# Patient Record
Sex: FEMALE | Race: WHITE | NOT HISPANIC OR LATINO | Employment: STUDENT | URBAN - METROPOLITAN AREA
[De-identification: names, ages, dates, MRNs, and addresses within clinical notes are randomized per-mention and may not be internally consistent; named-entity substitution may affect disease eponyms.]

---

## 2020-04-14 ENCOUNTER — INITIAL PRENATAL (OUTPATIENT)
Dept: OBSTETRICS AND GYNECOLOGY | Facility: CLINIC | Age: 25
End: 2020-04-14

## 2020-04-14 VITALS
HEIGHT: 67 IN | WEIGHT: 178 LBS | DIASTOLIC BLOOD PRESSURE: 76 MMHG | BODY MASS INDEX: 27.94 KG/M2 | SYSTOLIC BLOOD PRESSURE: 117 MMHG

## 2020-04-14 DIAGNOSIS — Z34.00 SUPERVISION OF NORMAL FIRST PREGNANCY, ANTEPARTUM: Primary | ICD-10-CM

## 2020-04-14 LAB
B-HCG UR QL: POSITIVE
GLUCOSE UR STRIP-MCNC: NEGATIVE MG/DL
INTERNAL NEGATIVE CONTROL: NEGATIVE
INTERNAL POSITIVE CONTROL: POSITIVE
Lab: ABNORMAL
PROT UR STRIP-MCNC: ABNORMAL MG/DL

## 2020-04-14 PROCEDURE — 81025 URINE PREGNANCY TEST: CPT | Performed by: OBSTETRICS & GYNECOLOGY

## 2020-04-14 PROCEDURE — 99204 OFFICE O/P NEW MOD 45 MIN: CPT | Performed by: OBSTETRICS & GYNECOLOGY

## 2020-04-14 RX ORDER — PNV NO.95/FERROUS FUM/FOLIC AC 28MG-0.8MG
TABLET ORAL
COMMUNITY
End: 2022-01-18

## 2020-04-14 NOTE — PATIENT INSTRUCTIONS
"Nausea/vomiting in pregnancy:  To help with nausea and vomiting you may try the following over the counter products:  Lucia chews, lucia tea, lucia gum  Mint tea, peppermint gum or candy  B6/Doxylamine: to be taken daily, not just when symptoms occur  Pyridoxine (also known as B6): 10 to 25 mg orally every six to eight hours; the maximum treatment dose suggested for pregnant women is 200 mg/day.  Doxylamine (available in some over-the-counter sleeping pills (eg, Unisom Sleep Tabs) and as an antihistamine chewable tablet (Aldex AN)). One-half of the 25 mg over-the-counter tablet or two chewable 5 mg tablets can be used off-label as an antiemetic  · The Association of Profess[employer] of Gyne[employer] and Obstetric[employer] has released a smart phone application that can help you monitor and manage your symptoms.  The Application is \"Managing NVP,\" and has a green icon that says \"APGO WELLMOM.\"    If these do not work, we may need to try prescription medications.    Please contact us if you are unable to tolerate liquids (even sips of water) for over six to eight hours, have weight loss of over 10% of your body weight, blood in vomit, fever (temp of 100.4 or higher), or other concerning symptoms arise.          The following are CPT codes for the optional tests in pregnancy:  Cystic fibrosis screenin  Spinal Muscular atrophy screening 84468  InformaSeq with XY (aneuploidy screening) 98172    The ICD 10 code for most pregnancies is Z34.90  Travel During Pregnancy:  • Always use seatbelts.  A lap belt should be worn below the abdomen (across the hips) and the shoulder belt should be worn across the center of your chest (between the breasts) away from your neck.  Do not put the shoulder belt under your arm or behind your back.  Pull any slack out of the belt.  • Air travel is safe in most uncomplicated pregnancies, but we do not recommend air travel past 36 weeks.  Airlines may also have restrictions, so check with your " airline before flying.  For some international flights, the travel cut off may be as early as 28 weeks gestation, and some airlines may require letters from your physician.  • When going on long trips in car, plane, train, or bus, frequent ambulation is important to prevent blood clots in legs and/or lungs.  The following may help with prevention of blood clots in legs: Drink lots of fluid, wear loose-fitting clothing, walk and stretch at regular intervals.    • Avoid areas with Zika outbreaks.  For the latest information, you may visit: www.cdc.gov/travel/notices/.  Resources: , , Committee Opinion 455  Nutrition during pregnancy  • Average weight gain during pregnancy is based on your pre-pregnancy body mass index (BMI).  See below for recommended weight gain:  o Underweight (BMI <18.5), we recommend 28 to 40lb weight gain  o Normal weight (BMI 18.5 to 24.9), we recommend a 25 to 35lb weight gain  o Overweight (BMI 25-29.9), we recommend a 15 to 25lb weight gain  o Obese (BMI >30), we recommend keeping weight gain under 20 lbs.    • You should speak with your physician regarding your specific weight goals for this pregnancy.   • Foods to avoid include:   o Fish: avoid certain types of fish such as shark, swordfish, keshia mackerel, tilefish.  Limit white (albacore) tuna to 6 oz per week.  Choose fish and shellfish such as shrimp, salmon, catfish, [place].  o Food-borne illness: Pregnant women are much more likely to get Listeriosis than non-pregnant women.  To help prevent this, avoid eating unpasteurized milk and unpasteurized milk products, hot dogs/lunch meat/cold cuts unless they are heated until steaming right before serving, refrigerated meat spreads, refrigerated smoked seafood, raw or undercooked seafood/eggs/meat.   • Vitamin D helps development of the baby’s bones and teeth.  Good sources of Vitamin D include milk fortified with Vitamin D and fatty fish such as salmon.    • Folic acid, also  known as folate, helps develop the baby’s brain and spine.  You should make sure your vitamin contains extra folic acid - at least 400mcg.    • Iron helps make red blood cells.  You need to make extra red blood cell sin pregnancy.  We recommend eating Iron-rich foods such as lean red meat, poultry, fish, dried beans and peas, iron-fortified cereals, and prune juice.  You may be recommended an iron supplement.  If so, it is absorbed more easily if taken with vitamin C-rich foods such as citrus fruits or tomatoes.    • It is important to eat a well balanced diet.  A good recourse for nutrition recommendations is: www.choosemyplate.gov.  • Limit caffeine intake to 200mg daily.  Some coffees/teas/sodas have very different levels of caffeine per serving, so check the nutrition labeling.   Resources: , Committee Opinion 548  Exercise during pregnancy  • If you are healthy and your pregnancy is normal, it is safe to continue or start most types of exercise.   Physical activity does not increase your risk of miscarriage, low birth weight or early delivery, but you should discuss specific limitations or any complications with your physician.    • Benefits of exercise during pregnancy include: reduced back pain, less constipation, promotes overall health and healthy weight gain which may decrease risks for certain pregnancy complications such as diabetes and/or preeclampsia.  • The CDC recommends 150 minutes of moderate intensity aerobic exercise per week.  Moderate intensity means that you are moving enough to raise your heart rate and start sweating, but you can talk normally.  Brisk walking, swimming/water workouts, modified yoga/pilates, and use of elliptical machines and/or stationary bikes are examples of aerobic activity.    • Precautions:  o Stay hydrated.  Drink plenty of water before, during and after your workout  o Wear supportive clothing such as a sports bra  o Do not become overheated.  Do not exercise  outside when it is very hot or humid  o Avoid lying flat on your back as much as possible  o AVOID contact sports, skydiving, sports that risk falling (such as skiing, surfing, off-road cycling, gymnastics, horseback riding), hot yoga/hot pilates, scuba diving  • Stop exercising if you experience: bleeding from the vagina, feeling dizzy/faint, shortness of breath before starting exercise, chest pain, headache, muscle weakness, calf pain or swelling, regular uterine contractions, fluid leaking from the vagina.    • Postpartum exercise: Continuing exercise after you deliver your baby will help boost your energy, strengthen muscles, promote better sleep, and relieve stress.  It also may be useful in preventing postpartum depression.  150 minutes of moderate-intensity aerobic activity is recommended.  Types of exercise and when you can start a regular exercise routine may be limited by the type of delivery you had.  Please discuss with your physician prior to resuming or starting exercise.    Resources: ,   Back pain during pregnancy  • Back pain is very common during pregnancy.  It may arise due to strain on your back muscles, weakness of the abdominal muscles, and pregnancy hormones.    • To prevent back pain:  o Wear shoes with good support. Flat shoes and high heels may not have good arch support.  o Consider a firm mattress  o Use good lifting practices.  Do not bend from the waist to pick things up.  Squat down and bend your knees, keeping a straight back.  o Sit in chairs with good back support or use a small pillow behind your lower back.    o Sleep on your side with one or two pillows between your legs  • To ease back pain:   o Exercise can help stretch strained muscles and strengthen weak muscles to promote good posture  • Contact your health care provider with severe pain, pain that persists for more than 2 weeks, fever, burning during urination, or vaginal bleeding.  Resources: FAQ  "115    Nausea/vomiting in pregnancy:  To help with nausea and vomiting you may try the following over the counter products:  Lucia chews, lucia tea, lucia gum  Mint tea, peppermint gum or candy  B6/Doxylamine: to be taken daily, not just when symptoms occur  Pyridoxine (also known as B6): 10 to 25 mg orally every six to eight hours; the maximum treatment dose suggested for pregnant women is 200 mg/day.  Doxylamine (available in some over-the-counter sleeping pills (eg, Unisom Sleep Tabs) and as an antihistamine chewable tablet (Aldex AN)). One-half of the 25 mg over-the-counter tablet or two chewable 5 mg tablets can be used off-label as an antiemetic  · The Association of Professors [employer] Gyne[employer] Obstetric[employer] has released a smart phone application that can help you monitor and manage your symptoms.  The Application is \"Managing NVP,\" and has a green icon that says \"APGO WELLMOM.\"    If these do not work, we may need to try prescription medications.    Please contact us if you are unable to tolerate liquids (even sips of water) for over six to eight hours, have weight loss of over 10% of your body weight, blood in vomit, fever (temp of 100.4 or higher), or other concerning symptoms arise.          The following are CPT codes for the optional tests in pregnancy:  Cystic fibrosis screenin  Spinal Muscular atrophy screening 22467  InformaSeq with XY (aneuploidy screening) 51401    The ICD 10 code for most pregnancies is Z34.90  Travel During Pregnancy:  • Always use seatbelts.  A lap belt should be worn below the abdomen (across the hips) and the shoulder belt should be worn across the center of your chest (between the breasts) away from your neck.  Do not put the shoulder belt under your arm or behind your back.  Pull any slack out of the belt.  • Air travel is safe in most uncomplicated pregnancies, but we do not recommend air travel past 36 weeks.  Airlines may also have restrictions, so check with your " airline before flying.  For some international flights, the travel cut off may be as early as 28 weeks gestation, and some airlines may require letters from your physician.  • When going on long trips in car, plane, train, or bus, frequent ambulation is important to prevent blood clots in legs and/or lungs.  The following may help with prevention of blood clots in legs: Drink lots of fluid, wear loose-fitting clothing, walk and stretch at regular intervals.    • Avoid areas with Zika outbreaks.  For the latest information, you may visit: www.cdc.gov/travel/notices/.  Resources: , , Committee Opinion 455  Nutrition during pregnancy  • Average weight gain during pregnancy is based on your pre-pregnancy body mass index (BMI).  See below for recommended weight gain:  o Underweight (BMI <18.5), we recommend 28 to 40lb weight gain  o Normal weight (BMI 18.5 to 24.9), we recommend a 25 to 35lb weight gain  o Overweight (BMI 25-29.9), we recommend a 15 to 25lb weight gain  o Obese (BMI >30), we recommend keeping weight gain under 20 lbs.    • You should speak with your physician regarding your specific weight goals for this pregnancy.   • Foods to avoid include:   o Fish: avoid certain types of fish such as shark, swordfish, keshia mackerel, tilefish.  Limit white (albacore) tuna to 6 oz per week.  Choose fish and shellfish such as shrimp, salmon, catfish, [place].  o Food-borne illness: Pregnant women are much more likely to get Listeriosis than non-pregnant women.  To help prevent this, avoid eating unpasteurized milk and unpasteurized milk products, hot dogs/lunch meat/cold cuts unless they are heated until steaming right before serving, refrigerated meat spreads, refrigerated smoked seafood, raw or undercooked seafood/eggs/meat.   • Vitamin D helps development of the baby’s bones and teeth.  Good sources of Vitamin D include milk fortified with Vitamin D and fatty fish such as salmon.    • Folic acid, also  115    SMOKING CESSATION DURING PREGNANCY   When you stop smoking...   • your baby will get more oxygen, even after just one day of not smoking   • your baby is less likely to have bronchitis and asthma   • there is less risk that your baby will be born too early   • there is a better chance that your baby will come home from the hospital with you   • you will be less likely to develop heart disease, stroke, lung cancer, chronic lung disease, and other smoke related diseases   • you will be more likely to live to know your grandchildren   • you will have more energy and breathe more easily   • you will have more money that you can spend on other things   • your clothes, hair, and home will smell better   • your food will taste better   • you will feel good about what you have done for yourself and your baby     TIMING OF HEALTH BENEFITS AFTER QUITTING SMOKING ([facility] 2004)   Time since quitting Benefits   20 minutes   Your heart rate drops.   12 hours   Carbon monoxide level in your blood drops to normal.   2 weeks to 3 months Your heart attack risk begins to drop.      Your lung function begins to improve.   1 to 9 months   Your coughing and shortness of breath decrease.   1 year    Your added risk of coronary heart disease is half that of a smoker’s.   5 to 15 years   Your stroke risk is reduced to that of a nonsmoker’s.   10 years   Your lung cancer death rate is about half that of a smoker’s. Your risk of cancers of the mouth, throat, esophagus, bladder, kidney,    and pancreas decreases.   15 years   Your risk of coronary heart disease is back to that of a nonsmoker’s.     Adapted from ACOG (www.acog.org)

## 2020-04-14 NOTE — PROGRESS NOTES
"Initial OB Visit    Chief Complaint   Patient presents with   • Initial Prenatal Visit     pt is here for her initial visit on her first pregnancy. pt reports \"hardness on both breast\". Pt also reports intermittent mild cramping         Kristin Jones is being seen today for her first obstetrical visit.  She is a 24 y.o.    7w0d gestation by sure but irregular LMP  FOB: Danny Reyer, boyfriend - aware and supportive  This is not a planned pregnancy.   OB History    Para Term  AB Living   1             SAB TAB Ectopic Molar Multiple Live Births                    # Outcome Date GA Lbr Ananda/2nd Weight Sex Delivery Anes PTL Lv   1 Current                Current obstetric complaints: breast tenderness, slight nausea   Prior obstetric issues, potential pregnancy concerns: denies  Family history of genetic issues (includes FOB): denies  Prior infections concerning in pregnancy (Rash, fever since LMP): denies  Varicella Hx: immune by history  Prior genetic testing: denies  History of abnormal pap smears: denies  History of STIs: chlamydia 6 Years ago  History of HSV in self or partner? denies  Prepregnancy weight same    Past Medical History:   Diagnosis Date   • Chlamydia    • Low blood sugar        Past Surgical History:   Procedure Laterality Date   • WISDOM TOOTH EXTRACTION  2015       No current outpatient medications on file.    Allergies   Allergen Reactions   • Bactrim [Sulfamethoxazole-Trimethoprim] GI Intolerance   • Doxycycline GI Intolerance       Social History     Socioeconomic History   • Marital status: Single     Spouse name: Not on file   • Number of children: Not on file   • Years of education: Not on file   • Highest education level: Not on file   Tobacco Use   • Smoking status: Current Every Day Smoker     Packs/day: 0.50     Types: Cigarettes   • Smokeless tobacco: Never Used   Substance and Sexual Activity   • Alcohol use: Not Currently   • Drug use: Yes     Types: Marijuana " "    Comment: cutting down    • Sexual activity: Yes     Partners: Male     Birth control/protection: None       Family History   Problem Relation Age of Onset   • Breast cancer Mother 40        survivor   • Cervical cancer Sister         40's   • Colon cancer Paternal Grandmother         80's   • Ovarian cancer Neg Hx    • Uterine cancer Neg Hx    • Pulmonary embolism Neg Hx    • Deep vein thrombosis Neg Hx        Review of systems     Constitutional: negative for chills, fevers and negative for fatigue  Eyes: negative  Ears, nose, mouth, throat, and face: negative for hearing loss and nasal congestion  Respiratory: negative for asthma and wheezing  Cardiovascular: negative for chest pain and dyspnea  Gastrointestinal: negative for dyspepsia, dysphagia abdominal pain  Genitourinary:negative for urinary incontinence  Integument/breast: negative for breast lump  Hematologic/lymphatic: negative for bleeding  Musculoskeletal:negative for aches  Neurological: negative for numbness/tingling  Behavioral/Psych: negative for anhedonia  Allergic/Immunologic: negative for rash, allergy         Objective    /76   Ht 170.2 cm (67\")   Wt 80.7 kg (178 lb)   LMP 02/25/2020 (Exact Date)   BMI 27.88 kg/m²       General Appearance:    Alert, cooperative, in no acute distress, habitus normal   Head:    Normocephalic, without obvious abnormality, atraumatic   Eyes:            Lids and lashes normal, conjunctivae and sclerae normal, no   icterus, no pallor, corneas clear   Ears:    Ears appear intact with no abnormalities noted       Neck:   No adenopathy, supple, trachea midline, no thyromegaly   Back:     No kyphosis present, no scoliosis present,                       Lungs:     Clear to auscultation,respirations regular, even and                   unlabored    Heart:    Regular rhythm and normal rate, normal S1 and S2, no            murmur, no gallop, no rub, no click   Breast Exam:    No masses, No nipple discharge "   Abdomen:     Normal bowel sounds, no masses, no organomegaly, soft        non-tender, non-distended, no guarding, no rebound                 tenderness   Genitalia:    Vulva - BUS-WNL, NEFG    Vagina - No discharge, No bleeding    Cervix - No Lesions, closed     Uterus - Consistent with 7 weeks    Adnexa - No masses, NT    Pelvimetry - clinically adequate, gynecoid pelvis     Extremities:   Moves all extremities well, no edema, no cyanosis, no              redness   Pulses:   Pulses palpable and equal bilaterally   Skin:   No bleeding, bruising or rash   Lymph nodes:   No palpable adenopathy   Neurologic:   Sensation intact, A&O times 3      Assessment  Pregnancy at 7w0d  Smoking, plans to quit by 16 weeks  Marijuana use, plans to quit  Breast tenderness     Plan    Initial labs drawn, GC/CHL screen done  Patient is on Prenatal vitamins  Problem list reviewed and updated.  Reviewed routine prenatal care with the office to include but not limited to:   Zika (travel restrictions/ok to use insect repellant), not to changing cat litter, food restrictions, avoidance of alcohol, tobacco and drugs and saunas/hot tubs, anticipated weight gain/nutrition requirements.  Reviewed nature of practice and hospital.  Reviewed recommended follow up, importance of compliance with care. We reviewed testing in pregnancy including HIV testing and urine drug screen.    Reviewed aneuploidy screening and CF/SMA screening.  We reviewed limitations of testing, possibility of false positive/negative results, possible need for other tests as indicated.    Reviewed that breast tenderness can be a normal response to hormonal changes in pregnancy. Breast exam benign.    Counseled on limitations of ultrasound in pregnancy in detecting aneuploidy/fetal anomalies    Encouraged smoking cessation - would like to set quit date before 16 weeks.  Encouraged on abstinence from marijuana     We reviewed that at this time, her BMI is classified as BMI  25.0-29.9        Classification: overweight.  We reviewed that in pregnancy, her recommended weight gain is 15-20lbs.  In the first trimester, caloric demand is typically not increased.  In the second and third trimester, the increased demand is approximately 350 and 450 calories respectively.    All questions answered.   Return in about 1 week (around 4/21/2020) for dating US, 4 weeks OB visit.      Jessica Jacome MD

## 2020-04-15 LAB
ABO GROUP BLD: NORMAL
BACTERIA UR CULT: NO GROWTH
BACTERIA UR CULT: NORMAL
BASOPHILS # BLD AUTO: 0.1 X10E3/UL (ref 0–0.2)
BASOPHILS NFR BLD AUTO: 1 %
BLD GP AB SCN SERPL QL: NEGATIVE
CONV .: NORMAL
CYTOLOGIST CVX/VAG CYTO: NORMAL
CYTOLOGY CVX/VAG DOC CYTO: NORMAL
CYTOLOGY CVX/VAG DOC THIN PREP: NORMAL
DX ICD CODE: NORMAL
EOSINOPHIL # BLD AUTO: 0.1 X10E3/UL (ref 0–0.4)
EOSINOPHIL NFR BLD AUTO: 1 %
ERYTHROCYTE [DISTWIDTH] IN BLOOD BY AUTOMATED COUNT: 13.6 % (ref 11.7–15.4)
HBV SURFACE AG SERPL QL IA: NEGATIVE
HCT VFR BLD AUTO: 43.5 % (ref 34–46.6)
HCV AB S/CO SERPL IA: <0.1 S/CO RATIO (ref 0–0.9)
HGB BLD-MCNC: 14.5 G/DL (ref 11.1–15.9)
HIV 1 & 2 AB SER-IMP: NORMAL
HIV 1+2 AB+HIV1 P24 AG SERPL QL IA: NON REACTIVE
IMM GRANULOCYTES # BLD AUTO: 0 X10E3/UL (ref 0–0.1)
IMM GRANULOCYTES NFR BLD AUTO: 0 %
LYMPHOCYTES # BLD AUTO: 1.9 X10E3/UL (ref 0.7–3.1)
LYMPHOCYTES NFR BLD AUTO: 21 %
MCH RBC QN AUTO: 28.9 PG (ref 26.6–33)
MCHC RBC AUTO-ENTMCNC: 33.3 G/DL (ref 31.5–35.7)
MCV RBC AUTO: 87 FL (ref 79–97)
MONOCYTES # BLD AUTO: 0.8 X10E3/UL (ref 0.1–0.9)
MONOCYTES NFR BLD AUTO: 8 %
NEUTROPHILS # BLD AUTO: 6.6 X10E3/UL (ref 1.4–7)
NEUTROPHILS NFR BLD AUTO: 69 %
OTHER STN SPEC: NORMAL
PLATELET # BLD AUTO: 283 X10E3/UL (ref 150–450)
RBC # BLD AUTO: 5.02 X10E6/UL (ref 3.77–5.28)
RH BLD: POSITIVE
RPR SER QL: NON REACTIVE
RUBV IGG SERPL IA-ACNC: 2.37 INDEX
STAT OF ADQ CVX/VAG CYTO-IMP: NORMAL
WBC # BLD AUTO: 9.4 X10E3/UL (ref 3.4–10.8)

## 2020-04-16 LAB
C TRACH RRNA SPEC QL NAA+PROBE: NEGATIVE
N GONORRHOEA RRNA SPEC QL NAA+PROBE: NEGATIVE
T VAGINALIS DNA SPEC QL NAA+PROBE: NEGATIVE

## 2020-04-17 LAB
AMPHETAMINES UR QL SCN: NEGATIVE NG/ML
BARBITURATES UR QL SCN: NEGATIVE NG/ML
BENZODIAZ UR QL: NEGATIVE NG/ML
BZE UR QL: NEGATIVE NG/ML
CANNABINOIDS UR CFM-MCNC: POSITIVE NG/ML
METHADONE UR QL SCN: NEGATIVE NG/ML
OPIATES UR QL: NEGATIVE NG/ML
PCP UR QL: NEGATIVE NG/ML
PROPOXYPH UR QL SCN: NEGATIVE NG/ML

## 2020-04-20 ENCOUNTER — PROCEDURE VISIT (OUTPATIENT)
Dept: OBSTETRICS AND GYNECOLOGY | Facility: CLINIC | Age: 25
End: 2020-04-20

## 2020-04-20 DIAGNOSIS — Z34.91 PREGNANCY WITH UNCERTAIN DATES IN FIRST TRIMESTER: Primary | ICD-10-CM

## 2020-04-20 PROCEDURE — 76817 TRANSVAGINAL US OBSTETRIC: CPT | Performed by: OBSTETRICS & GYNECOLOGY

## 2020-05-12 ENCOUNTER — ROUTINE PRENATAL (OUTPATIENT)
Dept: OBSTETRICS AND GYNECOLOGY | Facility: CLINIC | Age: 25
End: 2020-05-12

## 2020-05-12 VITALS — DIASTOLIC BLOOD PRESSURE: 69 MMHG | SYSTOLIC BLOOD PRESSURE: 116 MMHG | WEIGHT: 187 LBS | BODY MASS INDEX: 29.29 KG/M2

## 2020-05-12 DIAGNOSIS — Z34.00 SUPERVISION OF NORMAL FIRST PREGNANCY, ANTEPARTUM: Primary | ICD-10-CM

## 2020-05-12 LAB
GLUCOSE UR STRIP-MCNC: NEGATIVE MG/DL
PROT UR STRIP-MCNC: ABNORMAL MG/DL

## 2020-05-12 PROCEDURE — 99213 OFFICE O/P EST LOW 20 MIN: CPT | Performed by: OBSTETRICS & GYNECOLOGY

## 2020-05-12 NOTE — PROGRESS NOTES
Chief Complaint   Patient presents with   • Routine Prenatal Visit      Kristin Jones is a 24 y.o.  at 11w0d   No issues. Vomiting and nausea improved  Denies cramping, vaginal bleeding   Still smoking and using THC but trying to quit  /69   Wt 84.8 kg (187 lb)   LMP 2020 (Exact Date)   BMI 29.29 kg/m²    Gen: NAD, well appearing  Abd: nontender    See OB Flowsheet    ASSESSMENT:   1. IUP at 11w0d   2. Cigarette smoking  3. THC use  PLAN:  Reviewed labs from last visit.  FHT confirmed by US today  Recommend smoking and THC cessation. Patient aware and agrees to quit by 16 weeks  Reviewed first trimester bleeding/pain precautions, and indications for sooner follow up.     Return in about 4 weeks (around 2020).  Orders Placed This Encounter   Procedures   • POC Urinalysis Dipstick     This is an external result entered through the Results Console.

## 2020-06-09 ENCOUNTER — ROUTINE PRENATAL (OUTPATIENT)
Dept: OBSTETRICS AND GYNECOLOGY | Facility: CLINIC | Age: 25
End: 2020-06-09

## 2020-06-09 VITALS — SYSTOLIC BLOOD PRESSURE: 128 MMHG | BODY MASS INDEX: 29.44 KG/M2 | DIASTOLIC BLOOD PRESSURE: 79 MMHG | WEIGHT: 188 LBS

## 2020-06-09 DIAGNOSIS — Z34.00 SUPERVISION OF NORMAL FIRST PREGNANCY, ANTEPARTUM: Primary | ICD-10-CM

## 2020-06-09 PROCEDURE — 99213 OFFICE O/P EST LOW 20 MIN: CPT | Performed by: OBSTETRICS & GYNECOLOGY

## 2020-06-09 NOTE — PROGRESS NOTES
Chief Complaint   Patient presents with   • Routine Prenatal Visit      Kristin Jones is a 24 y.o.  at 15w0d   Reports she has stopped smoking weed about 7 days ago still smoking 5-6 cigarettes per day  Denies cramping, vaginal bleeding   Has not yet felt fetal movement    /79   Wt 85.3 kg (188 lb)   LMP 2020 (Exact Date)   BMI 29.44 kg/m²    Gen: NAD, well appearing  Abd: gravid, nontender    See OB Flowsheet    ASSESSMENT:   1. IUP at 15w0d   2. Cigarette use  PLAN:  I spent at least 10 minutes of 15 minute visit in face-to-face counseling on common second trimester symptoms.  Reviewed precautions for signs of  labor, anticipated fetal movements.     Return in about 4 weeks (around 2020) for anatomy US, OB visit.  No orders of the defined types were placed in this encounter.

## 2020-07-07 ENCOUNTER — PROCEDURE VISIT (OUTPATIENT)
Dept: OBSTETRICS AND GYNECOLOGY | Facility: CLINIC | Age: 25
End: 2020-07-07

## 2020-07-07 ENCOUNTER — ROUTINE PRENATAL (OUTPATIENT)
Dept: OBSTETRICS AND GYNECOLOGY | Facility: CLINIC | Age: 25
End: 2020-07-07

## 2020-07-07 VITALS — WEIGHT: 201 LBS | DIASTOLIC BLOOD PRESSURE: 73 MMHG | BODY MASS INDEX: 31.48 KG/M2 | SYSTOLIC BLOOD PRESSURE: 109 MMHG

## 2020-07-07 DIAGNOSIS — Z36.89 ENCOUNTER FOR FETAL ANATOMIC SURVEY: Primary | ICD-10-CM

## 2020-07-07 DIAGNOSIS — Z34.00 SUPERVISION OF NORMAL FIRST PREGNANCY, ANTEPARTUM: Primary | ICD-10-CM

## 2020-07-07 LAB
GLUCOSE UR STRIP-MCNC: NEGATIVE MG/DL
PROT UR STRIP-MCNC: NEGATIVE MG/DL

## 2020-07-07 PROCEDURE — 76805 OB US >/= 14 WKS SNGL FETUS: CPT | Performed by: OBSTETRICS & GYNECOLOGY

## 2020-07-07 PROCEDURE — 99213 OFFICE O/P EST LOW 20 MIN: CPT | Performed by: OBSTETRICS & GYNECOLOGY

## 2020-07-07 NOTE — PROGRESS NOTES
"Chief Complaint   Patient presents with   • Routine Prenatal Visit     pt reports her right thigh feels numb sometimes.       Kristin Jones is a 24 y.o.  at 19w0d   Reports breast pain on her left side over the last week on rare occasions. She says the right breast will be tender medially and then look \"dimpled.\"  She denies any nipple discharge or fever.  The changes are not curently present. The numbness on her thighs used to come and go when she weighed more.  It is currently just on the right. She reports a h/o breast cancer in her mom at 41yo.  No known testing for BRCA  Denies cramping, vaginal bleeding   Notes some fetal movement    /73   Wt 91.2 kg (201 lb)   LMP 2020 (Exact Date)   BMI 31.48 kg/m²    Gen: NAD, well appearing  Breasts: normal bilaterally, no masses, dimpling or nipple retraction, no nipple discharge  Abd: gravid, nontender  See OB Flowsheet    ASSESSMENT:   1. IUP at 19w0d   2. Breast change on right  PLAN:  Reviewed no breast changes on exam but if persistent or recurrent, consider imaging. Pt had benign imaging three years ago (with cysts noted).  Reviewed common second trimester symptoms.  Reviewed precautions for signs of  labor, anticipated fetal movements.     Reviewed anatomy US from today which was within normal limits, breech.   Reviewed common second trimester symptoms.  Reviewed precautions for signs of  labor, anticipated fetal movements.      Return in about 4 weeks (around 2020).  Orders Placed This Encounter   Procedures   • POC Urinalysis Dipstick     This is an external result entered through the Results Console.                "

## 2020-07-15 ENCOUNTER — TELEPHONE (OUTPATIENT)
Dept: OBSTETRICS AND GYNECOLOGY | Facility: CLINIC | Age: 25
End: 2020-07-15

## 2020-07-15 ENCOUNTER — ROUTINE PRENATAL (OUTPATIENT)
Dept: OBSTETRICS AND GYNECOLOGY | Facility: CLINIC | Age: 25
End: 2020-07-15

## 2020-07-15 VITALS — WEIGHT: 202 LBS | BODY MASS INDEX: 31.64 KG/M2 | SYSTOLIC BLOOD PRESSURE: 133 MMHG | DIASTOLIC BLOOD PRESSURE: 75 MMHG

## 2020-07-15 DIAGNOSIS — N63.0 BREAST MASS IN FEMALE: Primary | ICD-10-CM

## 2020-07-15 LAB
GLUCOSE UR STRIP-MCNC: NEGATIVE MG/DL
PROT UR STRIP-MCNC: ABNORMAL MG/DL

## 2020-07-15 PROCEDURE — 99214 OFFICE O/P EST MOD 30 MIN: CPT | Performed by: OBSTETRICS & GYNECOLOGY

## 2020-07-15 RX ORDER — ACETAMINOPHEN 500 MG
500 TABLET ORAL ONCE AS NEEDED
COMMUNITY
End: 2021-01-15

## 2020-07-15 NOTE — TELEPHONE ENCOUNTER
----- Message from Marina Krish sent at 7/14/2020 11:50 AM EDT -----  Regarding: Visit Follow-Up Question  Contact: [phone]  [facility]    ----- Message -----  From: Kristin Jones  Sent: 7/14/2020  11:37 AM EDT  To: Aureliano Mata [facility] Clinical Pool  Subject: Visit Follow-Up Question                         My breast around the nipple has swollen again, it started around 3 days ago and got progressively worse and my nipple is starting to invert with the dimpling around the areola. I have tried a warm shower and massaging but it isn't tender or painful. The nipple itself is sensitive but that's been since my pregnancy started. I am really concerned about it.       Thank you for your time,     Kristin Jones

## 2020-07-15 NOTE — PROGRESS NOTES
Chief Complaint   Patient presents with   • Breast Problem     pt reports swollen area on the areola for 4 days now.       Kristin Jones is a 24 y.o.  at 20w1d   Reports 4 days ago she had more dimpling and swelling on the medial side of the right breast. She has some mild pain underneath.  The nipple is now starting to invert.  Denies any lactation or discharge.  No fever or chills. It was looking red a few days ago, but no heat.  She reports the dimpling has worsened and the redness has improved.    Denies cramping, vaginal bleeding   Has not yet felt fetal movements    /75   Wt 91.6 kg (202 lb)   LMP 2020 (Exact Date)   BMI 31.64 kg/m²    Gen: NAD, well appearing  Breast: right with skin dimpling and nipple retraction from 3 to 6 o'clock; mass around 1cm in diameter at 3 o'clock near nipple palpable in upright position, other mobile mass, about 3cm in right upper outer quadrant palpable supine, left breast without nipple retraction, mass or skin changes  Abd: gravid, nontender  See OB Flowsheet    ASSESSMENT:   1. IUP at 20w1d   2. Right breast mass  PLAN:  Sent for right breast US  Plan for dicloxacillin given redness seen in photos shown by patient.    Recommend RTO in 1 week for re-examination    Return in about 1 week (around 2020).  Orders Placed This Encounter   Procedures   • US Breast Right Complete     Standing Status:   Future     Standing Expiration Date:   7/15/2021     Scheduling Instructions:      Patient is pregnant     Order Specific Question:   Reason for Exam:     Answer:   mobile mass about 2cm from nipple at 3 o'clock, 1cm in diameter, h/o cysts in breasts, right upper outer quadrant with cystic like mass around 10 o'clock   • POC Urinalysis Dipstick     This is an external result entered through the Results Console.

## 2020-07-15 NOTE — TELEPHONE ENCOUNTER
07/15/20  Spoke to patient and she is aware of appt at 12:45 pm today at the [employer] location.

## 2020-07-16 ENCOUNTER — APPOINTMENT (OUTPATIENT)
Dept: WOMENS IMAGING | Facility: HOSPITAL | Age: 25
End: 2020-07-16

## 2020-07-16 PROCEDURE — 76641 ULTRASOUND BREAST COMPLETE: CPT | Performed by: RADIOLOGY

## 2020-07-20 DIAGNOSIS — N63.0 BREAST MASS IN FEMALE: ICD-10-CM

## 2020-07-21 ENCOUNTER — OFFICE VISIT (OUTPATIENT)
Dept: OBSTETRICS AND GYNECOLOGY | Facility: CLINIC | Age: 25
End: 2020-07-21

## 2020-07-21 VITALS
SYSTOLIC BLOOD PRESSURE: 126 MMHG | WEIGHT: 206.4 LBS | HEIGHT: 67 IN | DIASTOLIC BLOOD PRESSURE: 74 MMHG | BODY MASS INDEX: 32.39 KG/M2

## 2020-07-21 DIAGNOSIS — Z87.898 HISTORY OF BREAST PROBLEM: Primary | ICD-10-CM

## 2020-07-21 DIAGNOSIS — Z34.00 SUPERVISION OF NORMAL FIRST PREGNANCY, ANTEPARTUM: ICD-10-CM

## 2020-07-21 PROCEDURE — 99213 OFFICE O/P EST LOW 20 MIN: CPT | Performed by: OBSTETRICS & GYNECOLOGY

## 2020-07-21 NOTE — PROGRESS NOTES
"Chief Complaint   Patient presents with   • Routine Prenatal Visit     follow up on breast lump      Kristin Jones is a 24 y.o.  at 21w0d   Reports she has had overall improvement in breast pain and skin changes. The findings come and go. The area is less \"hard\" but .  Denies cramping, vaginal bleeding   Notes fetal movement    /74   Ht 170.2 cm (67\")   Wt 93.6 kg (206 lb 6.4 oz)   LMP 2020 (Exact Date)   BMI 32.33 kg/m²    Gen: NAD, well appearing  Abd: gravid, nontender  Breasts: normal bilateral breast exam to appearance and palpation, mild tenderness from 4 to 8 o'clock on right breast without redness or mass  See OB Flowsheet    ASSESSMENT:   1. IUP at 21w0d   2. Breast changes  PLAN:  Reviewed imaging from 2020 - normal aside from cyst in right breast which appears benign.  Exam benign today.  Complete antibiotics as prescribed. Recommend ensuring appropriate fit of undergarments.   Return for Next scheduled follow up.  No orders of the defined types were placed in this encounter.               "

## 2020-07-27 ENCOUNTER — TELEPHONE (OUTPATIENT)
Dept: OBSTETRICS AND GYNECOLOGY | Facility: CLINIC | Age: 25
End: 2020-07-27

## 2020-07-27 NOTE — TELEPHONE ENCOUNTER
Please call patient and see what trouble she is having. Pain, fever, redness, skin changes? When I saw her last week things were improving. Thanks!

## 2020-07-27 NOTE — TELEPHONE ENCOUNTER
Can you schedule an appt for Ayaz please!!!  Call patient to let her know as well.  Thanks!    Preferably a morning appt.

## 2020-07-27 NOTE — TELEPHONE ENCOUNTER
If she has a fever, recommend going to ED. If she does not have a fever, recommend being seen tomorrow at [facility].

## 2020-07-27 NOTE — TELEPHONE ENCOUNTER
Good Morning,   Patient finished antibiotic you gave her for mastitis, and she is still having trouble.  Went to [facility] urgent care last night, and they gave her amoxicillin, which she will  today.  Im calling for records, please advise what she should do

## 2020-07-28 ENCOUNTER — ROUTINE PRENATAL (OUTPATIENT)
Dept: OBSTETRICS AND GYNECOLOGY | Facility: CLINIC | Age: 25
End: 2020-07-28

## 2020-07-28 VITALS — DIASTOLIC BLOOD PRESSURE: 69 MMHG | BODY MASS INDEX: 32.89 KG/M2 | WEIGHT: 210 LBS | SYSTOLIC BLOOD PRESSURE: 107 MMHG

## 2020-07-28 DIAGNOSIS — Z34.00 SUPERVISION OF NORMAL FIRST PREGNANCY, ANTEPARTUM: Primary | ICD-10-CM

## 2020-07-28 PROCEDURE — 99213 OFFICE O/P EST LOW 20 MIN: CPT | Performed by: OBSTETRICS & GYNECOLOGY

## 2020-07-28 RX ORDER — AMOXICILLIN 500 MG/1
500 CAPSULE ORAL
COMMUNITY
Start: 2020-07-26 | End: 2020-08-05

## 2020-07-28 NOTE — PROGRESS NOTES
"Chief Complaint   Patient presents with   • Breast Problem     pt reports she went to the immidiate care couple days ago due to breast to right breast problem. She reports pain, redness, and warm. Pt reports she started abx amoxicillin yesterday       Kristin Jones is a 24 y.o.  at 22w0d   Reports went to Urgent Care  night due to worsening pain and redness in right breast. She c/o \"fever\" in her breast but no fever systemically - breast just feels hot. She reports the redness was extending to the skin around the areola but has retracted since she started the amoxicillin.  She denies any chills, no nipple discharge  Denies cramping, vaginal bleeding     /69   Wt 95.3 kg (210 lb)   LMP 2020 (Exact Date)   BMI 32.89 kg/m²    Gen: NAD, well appearing  Abd: gravid, nontender  Breasts: no masses palpated bilaterally.  The right nipple retracts when laying supine but is symmetric to the left nipple. The right and left breast seem symmetric and the skin does not seem erythematous on either areola or skin of the breast. There is no thickening or dimpling appreciated at this time. The patient's exam seems nontender.  Reports some slight tenderness from 3 to 9o'clock but does not demonstrate guarding or hesitation on exam. She shows pictures of her nipple from  with some erythema appreciated at 3 to 9 o'clock but I cannot appreciate significant dimpling/retraction/masses and not extending beyond areola  See OB Flowsheet    ASSESSMENT:   1. IUP at 22w0d   2. Breast erythema  PLAN:  Continue Amoxicillin. She took 7 days of dicloxacillin and had negative breast imaging. 4 days after stopping abx her symptoms returned, but exam today seems less significant than at 20w.  If no resolution, consider breast surgery referral for second opinion.   Reviewed common second trimester symptoms.  Reviewed precautions for signs of  labor, anticipated fetal movements.     Recommend screening breast " imaging at 30 due to family history  Return for Next scheduled follow up.  No orders of the defined types were placed in this encounter.

## 2020-08-04 ENCOUNTER — ROUTINE PRENATAL (OUTPATIENT)
Dept: OBSTETRICS AND GYNECOLOGY | Facility: CLINIC | Age: 25
End: 2020-08-04

## 2020-08-04 VITALS — WEIGHT: 214 LBS | SYSTOLIC BLOOD PRESSURE: 114 MMHG | DIASTOLIC BLOOD PRESSURE: 75 MMHG | BODY MASS INDEX: 33.52 KG/M2

## 2020-08-04 DIAGNOSIS — N64.59 BREAST COMPLAINT: Primary | ICD-10-CM

## 2020-08-04 DIAGNOSIS — Z34.00 SUPERVISION OF NORMAL FIRST PREGNANCY, ANTEPARTUM: ICD-10-CM

## 2020-08-04 LAB
GLUCOSE UR STRIP-MCNC: NEGATIVE MG/DL
PROT UR STRIP-MCNC: ABNORMAL MG/DL

## 2020-08-04 PROCEDURE — 99213 OFFICE O/P EST LOW 20 MIN: CPT | Performed by: OBSTETRICS & GYNECOLOGY

## 2020-08-04 NOTE — PROGRESS NOTES
Chief Complaint   Patient presents with   • Routine Prenatal Visit     pt reports breast problem has improved just a little with abx.       Kristin Jones is a 24 y.o.  at 23w0d   Reports she is still sometimes seeing swelling and dimpling of the skin on the right breast.  This happens 1-2 times per day.  No nipple discharge. No fever.  She is on her last day of amoxicillin, prescribed by Urgent Care.  She had taken a course of dicloxacillin before that.  She also had negative breast imaging   Denies cramping, vaginal bleeding   Notes fetal movement    /75   Wt 97.1 kg (214 lb)   LMP 2020 (Exact Date)   BMI 33.52 kg/m²    Gen: NAD, well appearing  Breasts: right with some dimpling noted on the areola, no masses appreciated  Abd: gravid, nontender  See OB Flowsheet    ASSESSMENT:   1. IUP at 23w0d   2. Right breast complaint    PLAN:  Overall patient's breast exam appears benign at this time. She reports the pain and redness have improved but not completely resolved with antibiotics. Upon initial exam I appreciate some redness from 12 to 2 o'clock around the areola but this improved remote from patient touching her breast.  There are no masses appreciated. She has had normal imaging. Will refer for second opinion.    She recently took hospital tour, planning to review birth plan at next visit.  Reviewed common second trimester symptoms.  Reviewed precautions for signs of  labor, anticipated fetal movements.     Return in about 4 weeks (around 2020) for GCT, OB visit.  Orders Placed This Encounter   Procedures   • Gestational Screen 1 Hr ([facility])   • CBC (No Diff)   • Ambulatory Referral to General Surgery     Referral Priority:   Routine     Referral Type:   Consultation     Referral Reason:   Specialty Services Required     Requested Specialty:   General Surgery     Number of Visits Requested:   1   • POC Urinalysis Dipstick     This is an external result entered through the Results  Console.

## 2020-08-25 ENCOUNTER — HOSPITAL ENCOUNTER (OUTPATIENT)
Dept: ULTRASOUND IMAGING | Facility: HOSPITAL | Age: 25
Discharge: HOME | End: 2020-08-25
Attending: NURSE PRACTITIONER

## 2020-08-31 ENCOUNTER — OFFICE VISIT CONVERTED (OUTPATIENT)
Dept: SURGERY | Facility: CLINIC | Age: 25
End: 2020-08-31
Attending: SURGERY

## 2020-08-31 ENCOUNTER — CONVERSION ENCOUNTER (OUTPATIENT)
Dept: SURGERY | Facility: CLINIC | Age: 25
End: 2020-08-31

## 2020-09-04 ENCOUNTER — ROUTINE PRENATAL (OUTPATIENT)
Dept: OBSTETRICS AND GYNECOLOGY | Facility: CLINIC | Age: 25
End: 2020-09-04

## 2020-09-04 VITALS — WEIGHT: 226 LBS | SYSTOLIC BLOOD PRESSURE: 111 MMHG | BODY MASS INDEX: 35.4 KG/M2 | DIASTOLIC BLOOD PRESSURE: 69 MMHG

## 2020-09-04 DIAGNOSIS — Z34.00 SUPERVISION OF NORMAL FIRST PREGNANCY, ANTEPARTUM: Primary | ICD-10-CM

## 2020-09-04 LAB
GLUCOSE UR STRIP-MCNC: NEGATIVE MG/DL
PROT UR STRIP-MCNC: ABNORMAL MG/DL

## 2020-09-04 PROCEDURE — 99213 OFFICE O/P EST LOW 20 MIN: CPT | Performed by: OBSTETRICS & GYNECOLOGY

## 2020-09-04 NOTE — PROGRESS NOTES
Chief Complaint   Patient presents with   • Routine Prenatal Visit     pt started her 1 glucose test today. Pt reports adriana Jones is a 25 y.o.  at 27w3d   No bleeding, no cramping, no LOF  Fetal movement present and normal.  Reports heartburn,improved with  Tums  C/o difficulty sleeping.  Saw Surgeon at [facility] - plan to repeat US and get MRI after delivery.  /69   Wt 103 kg (226 lb)   LMP 2020 (Exact Date)   BMI 35.40 kg/m²    Gen: NAD, well appearing  Abd: gravid, nontender  See OB Flowsheet    ASSESSMENT:   1. IUP at 27w3d   2. Lower extremity edema  3. GERD  PLAN:  Reviewed common second trimester symptoms.  Reviewed precautions for signs of  labor, anticipated fetal movements.    GCT, CBC today  Continue Tums for GERD.  Add pepcid if needed  Reviewed safe use of doxylamine for sleep  Return in about 2 weeks (around 2020).  Orders Placed This Encounter   Procedures   • POC Urinalysis Dipstick     This is an external result entered through the Results Console.

## 2020-09-05 LAB
ERYTHROCYTE [DISTWIDTH] IN BLOOD BY AUTOMATED COUNT: 12.8 % (ref 12.3–15.4)
GLUCOSE 1H P 50 G GLC PO SERPL-MCNC: 87 MG/DL (ref 65–139)
HCT VFR BLD AUTO: 36.4 % (ref 34–46.6)
HGB BLD-MCNC: 11.9 G/DL (ref 12–15.9)
MCH RBC QN AUTO: 29.7 PG (ref 26.6–33)
MCHC RBC AUTO-ENTMCNC: 32.7 G/DL (ref 31.5–35.7)
MCV RBC AUTO: 90.8 FL (ref 79–97)
PLATELET # BLD AUTO: 245 10*3/MM3 (ref 140–450)
RBC # BLD AUTO: 4.01 10*6/MM3 (ref 3.77–5.28)
WBC # BLD AUTO: 12.07 10*3/MM3 (ref 3.4–10.8)

## 2020-09-18 ENCOUNTER — ROUTINE PRENATAL (OUTPATIENT)
Dept: OBSTETRICS AND GYNECOLOGY | Facility: CLINIC | Age: 25
End: 2020-09-18

## 2020-09-18 VITALS — BODY MASS INDEX: 35.4 KG/M2 | DIASTOLIC BLOOD PRESSURE: 62 MMHG | SYSTOLIC BLOOD PRESSURE: 104 MMHG | WEIGHT: 226 LBS

## 2020-09-18 DIAGNOSIS — Z34.00 SUPERVISION OF NORMAL FIRST PREGNANCY, ANTEPARTUM: Primary | ICD-10-CM

## 2020-09-18 PROCEDURE — 99214 OFFICE O/P EST MOD 30 MIN: CPT | Performed by: OBSTETRICS & GYNECOLOGY

## 2020-09-18 RX ORDER — CALCIUM CARBONATE 200(500)MG
1 TABLET,CHEWABLE ORAL DAILY
COMMUNITY
End: 2021-01-15

## 2020-09-18 RX ORDER — FAMOTIDINE 20 MG/1
20 TABLET, FILM COATED ORAL DAILY
Qty: 30 TABLET | Refills: 1 | Status: SHIPPED | OUTPATIENT
Start: 2020-09-18 | End: 2020-10-22 | Stop reason: SDUPTHER

## 2020-09-18 NOTE — PROGRESS NOTES
Chief Complaint   Patient presents with   • Routine Prenatal Visit     pt reports heartburn which she has been taking TAL for it but has not helped much       Kristin Jones is a 25 y.o.  at 29w3d   No bleeding, no cramping, no LOF  Fetal movement normal  Reports heartburn is still present and not improved with Tums    /62   Wt 103 kg (226 lb)   LMP 2020 (Exact Date)   BMI 35.40 kg/m²    Gen: NAD, well appearing  Abd: gravid, nontender  See OB Flowsheet    ASSESSMENT:   1. IUP at 29w3d   2. GERD  3. BMI 35  4. Breast changes  PLAN:  Tums and pepcid for GERD  Plan for repeat breast US and follow up with surgeon in 2 weeks.   Recommend growth US for obesity at 33w visit  Aware of GCT  Reviewed common symptoms of the third trimester.  Counseled on labor precautions and anticipated fetal movements.  Reviewed kick counts.  Patient is aware of the location of L&D.     Return in about 2 weeks (around 10/2/2020) for with one of my partners, 4 weeks with me and growth US.  Orders Placed This Encounter   Procedures   • US Ob Follow Up Transabdominal Approach each additional gestation     Standing Status:   Future     Standing Expiration Date:   2021     Order Specific Question:   Reason for Exam:     Answer:   BMI 35

## 2020-10-02 ENCOUNTER — ROUTINE PRENATAL (OUTPATIENT)
Dept: OBSTETRICS AND GYNECOLOGY | Facility: CLINIC | Age: 25
End: 2020-10-02

## 2020-10-02 VITALS — BODY MASS INDEX: 36.34 KG/M2 | DIASTOLIC BLOOD PRESSURE: 74 MMHG | SYSTOLIC BLOOD PRESSURE: 116 MMHG | WEIGHT: 232 LBS

## 2020-10-02 DIAGNOSIS — K21.00 GASTROESOPHAGEAL REFLUX DISEASE WITH ESOPHAGITIS, UNSPECIFIED WHETHER HEMORRHAGE: ICD-10-CM

## 2020-10-02 DIAGNOSIS — Z3A.31 31 WEEKS GESTATION OF PREGNANCY: Primary | ICD-10-CM

## 2020-10-02 LAB
GLUCOSE UR STRIP-MCNC: NEGATIVE MG/DL
PROT UR STRIP-MCNC: NEGATIVE MG/DL

## 2020-10-02 PROCEDURE — 99213 OFFICE O/P EST LOW 20 MIN: CPT | Performed by: NURSE PRACTITIONER

## 2020-10-02 PROCEDURE — 90471 IMMUNIZATION ADMIN: CPT | Performed by: NURSE PRACTITIONER

## 2020-10-02 PROCEDURE — 90715 TDAP VACCINE 7 YRS/> IM: CPT | Performed by: NURSE PRACTITIONER

## 2020-10-02 NOTE — PROGRESS NOTES
OB follow up     Kristin Jones is a 25 y.o.  31w3d being seen today for her obstetrical visit.  Patient reports no complaints. Fetal movement: normal. Heartburn improved since starting pepcid    Her prenatal care is complicated by (and status): uncomplicated    Review of Systems  Cramping/contractions : occas cramping  Vaginal bleeding: denies  Fetal movement normal    /74   Wt 105 kg (232 lb)   LMP 2020 (Exact Date)   BMI 36.34 kg/m²     FHT: 150 BPM   Uterine Size: 31 cm       Assessment    1) Pregnancy at 31w3d   Tdap today  Flu vaccine at next visit  Reviewed fetal kick counts.   She is aware of LD location  Reviewed proper hydration  2) GERD  Continue pepcid    Plan    Reviewed this stage of pregnancy  Problem list updated   Follow up in 2 weeks    MICHAEL Johnson  10/2/2020  13:31 EDT

## 2020-10-20 ENCOUNTER — ROUTINE PRENATAL (OUTPATIENT)
Dept: OBSTETRICS AND GYNECOLOGY | Facility: CLINIC | Age: 25
End: 2020-10-20

## 2020-10-20 VITALS — DIASTOLIC BLOOD PRESSURE: 74 MMHG | WEIGHT: 236 LBS | SYSTOLIC BLOOD PRESSURE: 110 MMHG | BODY MASS INDEX: 36.96 KG/M2

## 2020-10-20 DIAGNOSIS — Z34.00 SUPERVISION OF NORMAL FIRST PREGNANCY, ANTEPARTUM: ICD-10-CM

## 2020-10-20 LAB
GLUCOSE UR STRIP-MCNC: NEGATIVE MG/DL
PROT UR STRIP-MCNC: NEGATIVE MG/DL

## 2020-10-20 PROCEDURE — 90686 IIV4 VACC NO PRSV 0.5 ML IM: CPT | Performed by: OBSTETRICS & GYNECOLOGY

## 2020-10-20 PROCEDURE — 99213 OFFICE O/P EST LOW 20 MIN: CPT | Performed by: OBSTETRICS & GYNECOLOGY

## 2020-10-20 PROCEDURE — 90471 IMMUNIZATION ADMIN: CPT | Performed by: OBSTETRICS & GYNECOLOGY

## 2020-10-21 NOTE — PROGRESS NOTES
Chief Complaint   Patient presents with   • Routine Prenatal Visit      Kristin Jones is a 25 y.o.  at 34w0d   No bleeding, no cramping, no LOF  Fetal movement normal    /74   Wt 107 kg (236 lb)   LMP 2020 (Exact Date)   BMI 36.96 kg/m²    Gen: NAD, well appearing  Abd: gravid, nontender  See OB Flowsheet    ASSESSMENT:   1. IUP at 34w0d   2. GERD, stable on pepcid  PLAN:  Reviewed common symptoms of the third trimester.  Counseled on labor precautions and anticipated fetal movements.  Reviewed kick counts.  Patient is aware of the location of L&D.     Flu shot accepted, counseled on use of Tamiflu for prophylaxis if exposed or treatment of flu   Reviewed ultrasound from today  Return in about 2 weeks (around 11/3/2020).  Orders Placed This Encounter   Procedures   • Fluarix/Fluzone/Afluria Quad>6 Months   • POC Urinalysis Dipstick     This is an external result entered through the Results Console.

## 2020-10-23 RX ORDER — FAMOTIDINE 20 MG/1
20 TABLET, FILM COATED ORAL DAILY
Qty: 30 TABLET | Refills: 1 | Status: SHIPPED | OUTPATIENT
Start: 2020-10-23 | End: 2021-01-15

## 2020-11-03 ENCOUNTER — ROUTINE PRENATAL (OUTPATIENT)
Dept: OBSTETRICS AND GYNECOLOGY | Facility: CLINIC | Age: 25
End: 2020-11-03

## 2020-11-03 VITALS — WEIGHT: 240 LBS | SYSTOLIC BLOOD PRESSURE: 123 MMHG | BODY MASS INDEX: 37.59 KG/M2 | DIASTOLIC BLOOD PRESSURE: 75 MMHG

## 2020-11-03 DIAGNOSIS — Z34.00 SUPERVISION OF NORMAL FIRST PREGNANCY, ANTEPARTUM: Primary | ICD-10-CM

## 2020-11-03 LAB
GLUCOSE UR STRIP-MCNC: NEGATIVE MG/DL
PROT UR STRIP-MCNC: ABNORMAL MG/DL

## 2020-11-03 PROCEDURE — 99213 OFFICE O/P EST LOW 20 MIN: CPT | Performed by: OBSTETRICS & GYNECOLOGY

## 2020-11-03 NOTE — PROGRESS NOTES
Chief Complaint   Patient presents with   • Routine Prenatal Visit     pt reports feeling cramping which started last week.       Kristin Jones is a 25 y.o.  at 36w0d   No bleeding, no LOF; some cramping - not regular  Fetal movement normal    Wt 109 kg (240 lb)   LMP 2020 (Exact Date)   BMI 37.59 kg/m²    Gen: NAD, well appearing  Abd: nontender  See OB Flowsheet    ASSESSMENT:   1. IUP at 36w0d   2. GERD, stable  PLAN:  Reviewed birthing plan  GBS today  Reviewed common symptoms of the third trimester.  Counseled on labor precautions and anticipated fetal movements.  Reviewed kick counts.  Patient is aware of the location of L&D.     Encouraged to find pediatrician  Return in about 1 week (around 11/10/2020).  Orders Placed This Encounter   Procedures   • Group B Streptococcus Culture - Swab, Vaginal/Rectum

## 2020-11-04 LAB
AMPHETAMINES UR QL SCN: NEGATIVE NG/ML
BARBITURATES UR QL SCN: NEGATIVE NG/ML
BENZODIAZ UR QL: NEGATIVE NG/ML
BZE UR QL: NEGATIVE NG/ML
CANNABINOIDS UR QL SCN: NEGATIVE NG/ML
METHADONE UR QL SCN: NEGATIVE NG/ML
OPIATES UR QL: NEGATIVE NG/ML
PCP UR QL: NEGATIVE NG/ML
PROPOXYPH UR QL SCN: NEGATIVE NG/ML

## 2020-11-07 LAB — B-HEM STREP SPEC QL CULT: NEGATIVE

## 2020-11-12 ENCOUNTER — ROUTINE PRENATAL (OUTPATIENT)
Dept: OBSTETRICS AND GYNECOLOGY | Facility: CLINIC | Age: 25
End: 2020-11-12

## 2020-11-12 VITALS — WEIGHT: 241 LBS | BODY MASS INDEX: 37.75 KG/M2 | SYSTOLIC BLOOD PRESSURE: 118 MMHG | DIASTOLIC BLOOD PRESSURE: 82 MMHG

## 2020-11-12 DIAGNOSIS — O99.333 TOBACCO SMOKING COMPLICATING PREGNANCY IN THIRD TRIMESTER: Primary | ICD-10-CM

## 2020-11-12 LAB
GLUCOSE UR STRIP-MCNC: NEGATIVE MG/DL
PROT UR STRIP-MCNC: ABNORMAL MG/DL

## 2020-11-12 PROCEDURE — 99213 OFFICE O/P EST LOW 20 MIN: CPT | Performed by: OBSTETRICS & GYNECOLOGY

## 2020-11-12 NOTE — PROGRESS NOTES
Chief Complaint   Patient presents with   • Routine Prenatal Visit     pt reports she has been seeing black spots in her vision that she is concermed about. Denies headaches or dizzyness.       Kristin Jones is a 25 y.o.  at 37w2d   No bleeding, no LOF; some mild cramping  Fetal movement normal  Reports she has been seeing some dark spots in her vision for a week or so, they seem to dissipate. No headache/ vision changes/RUQ pain.      /82   Wt 109 kg (241 lb)   LMP 2020 (Exact Date)   BMI 37.75 kg/m²    Gen: NAD, well appearing  Abd: nontender  See OB Flowsheet    ASSESSMENT:   1. IUP at 37w2d   2. GERD, stable  3. Vision changes  4. Cigarette smoking in pregnancy  PLAN:  BP within normal limits. Reviewed preeclampsia precautions in detail  Will repeat growth US for smoking in pregnancy (currently down to 5 cigarettes per day)  Aware GBS negative  Reviewed common symptoms of the third trimester.  Counseled on labor precautions and anticipated fetal movements.  Reviewed kick counts.  Patient is aware of the location of L&D.     Return in about 1 week (around 2020) for OB visit, growth US.  Orders Placed This Encounter   Procedures   • US Ob Follow Up Transabdominal Approach each additional gestation     Standing Status:   Future     Standing Expiration Date:   2021     Order Specific Question:   Reason for Exam:     Answer:   cigarette smoking in pregnancy   • POC Urinalysis Dipstick     This is an external result entered through the Results Console.

## 2020-11-20 ENCOUNTER — ROUTINE PRENATAL (OUTPATIENT)
Dept: OBSTETRICS AND GYNECOLOGY | Facility: CLINIC | Age: 25
End: 2020-11-20

## 2020-11-20 VITALS — BODY MASS INDEX: 37.87 KG/M2 | SYSTOLIC BLOOD PRESSURE: 113 MMHG | WEIGHT: 241.8 LBS | DIASTOLIC BLOOD PRESSURE: 74 MMHG

## 2020-11-20 DIAGNOSIS — Z3A.38 38 WEEKS GESTATION OF PREGNANCY: Primary | ICD-10-CM

## 2020-11-20 LAB
GLUCOSE UR STRIP-MCNC: NEGATIVE MG/DL
PROT UR STRIP-MCNC: ABNORMAL MG/DL

## 2020-11-20 PROCEDURE — 99213 OFFICE O/P EST LOW 20 MIN: CPT | Performed by: NURSE PRACTITIONER

## 2020-11-20 NOTE — PROGRESS NOTES
OB follow up     Kristin Jones is a 25 y.o.  38w3d being seen today for her obstetrical visit.  Patient reports no complaints. Fetal movement: normal.    Her prenatal care is complicated by (and status): GERD, tobacco use in pregnancy    Review of Systems  Cramping/contractions : denies  Vaginal bleeding: denies  Fetal movement: normal     /74   Wt 110 kg (241 lb 12.8 oz)   LMP 2020 (Exact Date)   BMI 37.87 kg/m²     FHT: 160 BPM   Uterine Size: size equals dates       Assessment    1) Pregnancy at 38w3d   Reviewed S&S of labor  Reviewed fetal kick counts  GBS negative  She is aware of L&D location  2)GERD, stable  3) Vision changes  Still ongoing, no changes  4) Cigarette smoking in pregnancy    Plan    Reviewed this stage of pregnancy  Problem list updated   Follow up in 1 weeks    Kristin Mckeon, MICHAEL  2020  14:17 EST

## 2020-11-25 ENCOUNTER — ROUTINE PRENATAL (OUTPATIENT)
Dept: OBSTETRICS AND GYNECOLOGY | Facility: CLINIC | Age: 25
End: 2020-11-25

## 2020-11-25 VITALS — DIASTOLIC BLOOD PRESSURE: 82 MMHG | SYSTOLIC BLOOD PRESSURE: 131 MMHG | BODY MASS INDEX: 38.53 KG/M2 | WEIGHT: 246 LBS

## 2020-11-25 DIAGNOSIS — Z34.80 SUPERVISION OF OTHER NORMAL PREGNANCY, ANTEPARTUM: Primary | ICD-10-CM

## 2020-11-25 PROCEDURE — 99213 OFFICE O/P EST LOW 20 MIN: CPT | Performed by: OBSTETRICS & GYNECOLOGY

## 2020-11-25 NOTE — PROGRESS NOTES
Chief Complaint   Patient presents with   • Routine Prenatal Visit      Kristin Jones is a 25 y.o.  at 39w1d   No bleeding, no LOF; some occasional cramping  Fetal movement normal    /82   Wt 112 kg (246 lb)   LMP 2020 (Exact Date)   BMI 38.53 kg/m²    Gen: NAD, well appearing  Abd: nontender  See OB Flowsheet    ASSESSMENT:   1. IUP at 39w1d   2. GERD, stable  3. Cigarette smoking  PLAN:  Reviewed with patient risks/benefits of IOL including possibility of decreased NICU admission with IOL at 39 weeks. She will consider with her boyfriend and contact us if they wish to see if IOL is feasible this weekend.  She was counseled that there is limited staffing due to pandemic and holiday so it is possible we would need to defer IOL to next week.  Due to her boyfriend's schedule, IOL may be best next Friday unless a sooner indication arises.    Reviewed common symptoms of the third trimester.  Counseled on labor precautions and anticipated fetal movements.  Reviewed kick counts.  Patient is aware of the location of L&D.     Return in about 1 week (around 2020).  No orders of the defined types were placed in this encounter.

## 2020-11-27 ENCOUNTER — HOSPITAL ENCOUNTER (EMERGENCY)
Facility: HOSPITAL | Age: 25
Discharge: HOME | End: 2020-11-27
Attending: OBSTETRICS & GYNECOLOGY | Admitting: OBSTETRICS & GYNECOLOGY

## 2020-11-27 ENCOUNTER — ANESTHESIA (OUTPATIENT)
Dept: EMERGENCY DEPT | Facility: HOSPITAL | Age: 25
End: 2020-11-27

## 2020-11-27 ENCOUNTER — ANESTHESIA EVENT (OUTPATIENT)
Dept: EMERGENCY DEPT | Facility: HOSPITAL | Age: 25
End: 2020-11-27

## 2020-11-27 VITALS
BODY MASS INDEX: 38.53 KG/M2 | HEART RATE: 90 BPM | TEMPERATURE: 98.1 F | HEIGHT: 67 IN | OXYGEN SATURATION: 99 % | RESPIRATION RATE: 20 BRPM | DIASTOLIC BLOOD PRESSURE: 60 MMHG | SYSTOLIC BLOOD PRESSURE: 111 MMHG

## 2020-11-27 LAB
A1 MICROGLOB PLACENTAL VAG QL: NEGATIVE
BILIRUB UR QL STRIP: NEGATIVE
CLARITY UR: CLEAR
COLOR UR: YELLOW
GLUCOSE UR STRIP-MCNC: NEGATIVE MG/DL
HGB UR QL STRIP.AUTO: NEGATIVE
KETONES UR QL STRIP: NEGATIVE
LEUKOCYTE ESTERASE UR QL STRIP.AUTO: NEGATIVE
NITRITE UR QL STRIP: NEGATIVE
PH UR STRIP.AUTO: 6 [PH] (ref 5–8)
PROT UR QL STRIP: NEGATIVE
SP GR UR STRIP: 1.02 (ref 1–1.03)
UROBILINOGEN UR QL STRIP: NORMAL

## 2020-11-27 PROCEDURE — 81003 URINALYSIS AUTO W/O SCOPE: CPT | Performed by: OBSTETRICS & GYNECOLOGY

## 2020-11-27 PROCEDURE — 84112 EVAL AMNIOTIC FLUID PROTEIN: CPT | Performed by: OBSTETRICS & GYNECOLOGY

## 2020-11-27 PROCEDURE — 99284 EMERGENCY DEPT VISIT MOD MDM: CPT

## 2020-11-27 PROCEDURE — 59025 FETAL NON-STRESS TEST: CPT

## 2020-11-27 PROCEDURE — 87086 URINE CULTURE/COLONY COUNT: CPT | Performed by: OBSTETRICS & GYNECOLOGY

## 2020-11-27 RX ORDER — DIPHENHYDRAMINE HYDROCHLORIDE 50 MG/ML
12.5 INJECTION INTRAMUSCULAR; INTRAVENOUS EVERY 8 HOURS PRN
Status: CANCELLED | OUTPATIENT
Start: 2020-11-27

## 2020-11-27 RX ORDER — FAMOTIDINE 10 MG/ML
20 INJECTION, SOLUTION INTRAVENOUS ONCE AS NEEDED
Status: CANCELLED | OUTPATIENT
Start: 2020-11-27

## 2020-11-27 RX ORDER — ONDANSETRON 2 MG/ML
4 INJECTION INTRAMUSCULAR; INTRAVENOUS ONCE AS NEEDED
Status: CANCELLED | OUTPATIENT
Start: 2020-11-27

## 2020-11-27 RX ORDER — EPHEDRINE SULFATE 50 MG/ML
5 INJECTION, SOLUTION INTRAVENOUS AS NEEDED
Status: CANCELLED | OUTPATIENT
Start: 2020-11-27

## 2020-11-28 ENCOUNTER — HOSPITAL ENCOUNTER (EMERGENCY)
Facility: HOSPITAL | Age: 25
Discharge: HOME | End: 2020-11-29
Attending: OBSTETRICS & GYNECOLOGY | Admitting: OBSTETRICS & GYNECOLOGY

## 2020-11-28 LAB
BACTERIA SPEC AEROBE CULT: NO GROWTH
BACTERIA UR QL AUTO: ABNORMAL /HPF
BILIRUB UR QL STRIP: NEGATIVE
CLARITY UR: CLEAR
COLOR UR: YELLOW
GLUCOSE UR STRIP-MCNC: NEGATIVE MG/DL
HGB UR QL STRIP.AUTO: NEGATIVE
HYALINE CASTS UR QL AUTO: ABNORMAL /LPF
KETONES UR QL STRIP: ABNORMAL
LEUKOCYTE ESTERASE UR QL STRIP.AUTO: ABNORMAL
NITRITE UR QL STRIP: NEGATIVE
PH UR STRIP.AUTO: 6 [PH] (ref 5–8)
PROT UR QL STRIP: NEGATIVE
RBC # UR: ABNORMAL /HPF
REF LAB TEST METHOD: ABNORMAL
SP GR UR STRIP: 1.02 (ref 1–1.03)
SQUAMOUS #/AREA URNS HPF: ABNORMAL /HPF
UROBILINOGEN UR QL STRIP: ABNORMAL
WBC UR QL AUTO: ABNORMAL /HPF

## 2020-11-28 PROCEDURE — 81001 URINALYSIS AUTO W/SCOPE: CPT | Performed by: OBSTETRICS & GYNECOLOGY

## 2020-11-28 PROCEDURE — 99284 EMERGENCY DEPT VISIT MOD MDM: CPT

## 2020-11-28 PROCEDURE — 59025 FETAL NON-STRESS TEST: CPT

## 2020-11-28 PROCEDURE — 87086 URINE CULTURE/COLONY COUNT: CPT | Performed by: OBSTETRICS & GYNECOLOGY

## 2020-11-28 NOTE — ANESTHESIA PREPROCEDURE EVALUATION
Anesthesia Evaluation     Patient summary reviewed and Nursing notes reviewed   NPO Solid Status: > 8 hours             Airway   Mallampati: I  TM distance: >3 FB  Neck ROM: full  No difficulty expected  Dental - normal exam     Pulmonary - normal exam   (+) a smoker Current Abstained day of surgery,   Cardiovascular - negative cardio ROS and normal exam  Exercise tolerance: good (4-7 METS)        Neuro/Psych- negative ROS  GI/Hepatic/Renal/Endo - negative ROS     Musculoskeletal (-) negative ROS    Abdominal  - normal exam    Bowel sounds: normal.   Substance History - negative use     OB/GYN    (+) Pregnant,         Other            Phys Exam Other: Pregnant.                Anesthesia Plan    ASA 2     epidural       Anesthetic plan, all risks, benefits, and alternatives have been provided, discussed and informed consent has been obtained with: patient.

## 2020-11-29 ENCOUNTER — HOSPITAL ENCOUNTER (INPATIENT)
Facility: HOSPITAL | Age: 25
LOS: 3 days | Discharge: HOME | End: 2020-12-02
Attending: OBSTETRICS & GYNECOLOGY | Admitting: OBSTETRICS & GYNECOLOGY

## 2020-11-29 ENCOUNTER — HOSPITAL ENCOUNTER (EMERGENCY)
Dept: LABOR AND DELIVERY | Facility: HOSPITAL | Age: 25
Discharge: HOME | End: 2020-11-29

## 2020-11-29 VITALS
WEIGHT: 250.4 LBS | DIASTOLIC BLOOD PRESSURE: 72 MMHG | TEMPERATURE: 97.8 F | HEART RATE: 88 BPM | SYSTOLIC BLOOD PRESSURE: 119 MMHG | BODY MASS INDEX: 39.3 KG/M2 | HEIGHT: 67 IN | RESPIRATION RATE: 18 BRPM

## 2020-11-29 DIAGNOSIS — Z34.80 SUPERVISION OF OTHER NORMAL PREGNANCY, ANTEPARTUM: ICD-10-CM

## 2020-11-29 DIAGNOSIS — Z34.90 TERM PREGNANCY: Primary | ICD-10-CM

## 2020-11-29 LAB
ABO GROUP BLD: NORMAL
B PARAPERT DNA SPEC QL NAA+PROBE: NOT DETECTED
B PERT DNA SPEC QL NAA+PROBE: NOT DETECTED
BLD GP AB SCN SERPL QL: NEGATIVE
C PNEUM DNA NPH QL NAA+NON-PROBE: NOT DETECTED
DEPRECATED RDW RBC AUTO: 41.1 FL (ref 37–54)
ERYTHROCYTE [DISTWIDTH] IN BLOOD BY AUTOMATED COUNT: 13.2 % (ref 12.3–15.4)
FLUAV SUBTYP SPEC NAA+PROBE: NOT DETECTED
FLUBV RNA ISLT QL NAA+PROBE: NOT DETECTED
HADV DNA SPEC NAA+PROBE: NOT DETECTED
HCOV 229E RNA SPEC QL NAA+PROBE: NOT DETECTED
HCOV HKU1 RNA SPEC QL NAA+PROBE: NOT DETECTED
HCOV NL63 RNA SPEC QL NAA+PROBE: NOT DETECTED
HCOV OC43 RNA SPEC QL NAA+PROBE: NOT DETECTED
HCT VFR BLD AUTO: 40.3 % (ref 34–46.6)
HGB BLD-MCNC: 13.4 G/DL (ref 12–15.9)
HMPV RNA NPH QL NAA+NON-PROBE: NOT DETECTED
HPIV1 RNA SPEC QL NAA+PROBE: NOT DETECTED
HPIV2 RNA SPEC QL NAA+PROBE: NOT DETECTED
HPIV3 RNA NPH QL NAA+PROBE: NOT DETECTED
HPIV4 P GENE NPH QL NAA+PROBE: NOT DETECTED
M PNEUMO IGG SER IA-ACNC: NOT DETECTED
MCH RBC QN AUTO: 28.6 PG (ref 26.6–33)
MCHC RBC AUTO-ENTMCNC: 33.3 G/DL (ref 31.5–35.7)
MCV RBC AUTO: 86.1 FL (ref 79–97)
PLATELET # BLD AUTO: 229 10*3/MM3 (ref 140–450)
PMV BLD AUTO: 10.5 FL (ref 6–12)
RBC # BLD AUTO: 4.68 10*6/MM3 (ref 3.77–5.28)
RH BLD: POSITIVE
RHINOVIRUS RNA SPEC NAA+PROBE: NOT DETECTED
RSV RNA NPH QL NAA+NON-PROBE: NOT DETECTED
SARS-COV-2 RNA NPH QL NAA+NON-PROBE: NOT DETECTED
T&S EXPIRATION DATE: NORMAL
WBC # BLD AUTO: 13.3 10*3/MM3 (ref 3.4–10.8)

## 2020-11-29 PROCEDURE — C1755 CATHETER, INTRASPINAL: HCPCS | Performed by: ANESTHESIOLOGY

## 2020-11-29 PROCEDURE — C1755 CATHETER, INTRASPINAL: HCPCS

## 2020-11-29 PROCEDURE — 0202U NFCT DS 22 TRGT SARS-COV-2: CPT | Performed by: OBSTETRICS & GYNECOLOGY

## 2020-11-29 PROCEDURE — 86901 BLOOD TYPING SEROLOGIC RH(D): CPT | Performed by: OBSTETRICS & GYNECOLOGY

## 2020-11-29 PROCEDURE — 86900 BLOOD TYPING SEROLOGIC ABO: CPT | Performed by: OBSTETRICS & GYNECOLOGY

## 2020-11-29 PROCEDURE — 85027 COMPLETE CBC AUTOMATED: CPT | Performed by: OBSTETRICS & GYNECOLOGY

## 2020-11-29 PROCEDURE — 86850 RBC ANTIBODY SCREEN: CPT | Performed by: OBSTETRICS & GYNECOLOGY

## 2020-11-29 PROCEDURE — 59025 FETAL NON-STRESS TEST: CPT

## 2020-11-29 RX ORDER — ONDANSETRON 2 MG/ML
4 INJECTION INTRAMUSCULAR; INTRAVENOUS ONCE AS NEEDED
Status: DISCONTINUED | OUTPATIENT
Start: 2020-11-29 | End: 2020-11-30 | Stop reason: HOSPADM

## 2020-11-29 RX ORDER — OXYTOCIN 10 [USP'U]/ML
2-20 INJECTION, SOLUTION INTRAMUSCULAR; INTRAVENOUS
Status: CANCELLED | OUTPATIENT
Start: 2020-11-29

## 2020-11-29 RX ORDER — FAMOTIDINE 10 MG/ML
20 INJECTION, SOLUTION INTRAVENOUS ONCE AS NEEDED
Status: DISCONTINUED | OUTPATIENT
Start: 2020-11-29 | End: 2020-11-30 | Stop reason: HOSPADM

## 2020-11-29 RX ORDER — SODIUM CHLORIDE 0.9 % (FLUSH) 0.9 %
1-10 SYRINGE (ML) INJECTION AS NEEDED
Status: CANCELLED | OUTPATIENT
Start: 2020-11-29

## 2020-11-29 RX ORDER — OXYTOCIN 10 [USP'U]/ML
125 INJECTION, SOLUTION INTRAMUSCULAR; INTRAVENOUS CONTINUOUS PRN
Status: CANCELLED | OUTPATIENT
Start: 2020-11-29

## 2020-11-29 RX ORDER — CARBOPROST TROMETHAMINE 250 UG/ML
250 INJECTION, SOLUTION INTRAMUSCULAR AS NEEDED
Status: CANCELLED | OUTPATIENT
Start: 2020-11-29

## 2020-11-29 RX ORDER — OXYTOCIN 10 [USP'U]/ML
999 INJECTION, SOLUTION INTRAMUSCULAR; INTRAVENOUS ONCE
Status: CANCELLED | OUTPATIENT
Start: 2020-11-29

## 2020-11-29 RX ORDER — OXYTOCIN-SODIUM CHLORIDE 0.9% IV SOLN 30 UNIT/500ML 30-0.9/5 UT/ML-%
2-20 SOLUTION INTRAVENOUS
Status: DISCONTINUED | OUTPATIENT
Start: 2020-11-29 | End: 2020-11-30 | Stop reason: HOSPADM

## 2020-11-29 RX ORDER — LIDOCAINE HYDROCHLORIDE 10 MG/ML
5 INJECTION, SOLUTION EPIDURAL; INFILTRATION; INTRACAUDAL; PERINEURAL AS NEEDED
Status: DISCONTINUED | OUTPATIENT
Start: 2020-11-29 | End: 2020-11-30 | Stop reason: HOSPADM

## 2020-11-29 RX ORDER — LIDOCAINE HYDROCHLORIDE 10 MG/ML
5 INJECTION, SOLUTION EPIDURAL; INFILTRATION; INTRACAUDAL; PERINEURAL AS NEEDED
Status: CANCELLED | OUTPATIENT
Start: 2020-11-29

## 2020-11-29 RX ORDER — DIPHENHYDRAMINE HYDROCHLORIDE 50 MG/ML
12.5 INJECTION INTRAMUSCULAR; INTRAVENOUS EVERY 8 HOURS PRN
Status: DISCONTINUED | OUTPATIENT
Start: 2020-11-29 | End: 2020-11-30 | Stop reason: HOSPADM

## 2020-11-29 RX ORDER — OXYTOCIN 10 [USP'U]/ML
250 INJECTION, SOLUTION INTRAMUSCULAR; INTRAVENOUS CONTINUOUS
Status: CANCELLED | OUTPATIENT
Start: 2020-11-29 | End: 2020-11-29

## 2020-11-29 RX ORDER — MAGNESIUM CARB/ALUMINUM HYDROX 105-160MG
30 TABLET,CHEWABLE ORAL ONCE
Status: COMPLETED | OUTPATIENT
Start: 2020-11-29 | End: 2020-11-30

## 2020-11-29 RX ORDER — SODIUM CHLORIDE 0.9 % (FLUSH) 0.9 %
1-10 SYRINGE (ML) INJECTION AS NEEDED
Status: DISCONTINUED | OUTPATIENT
Start: 2020-11-29 | End: 2020-11-30 | Stop reason: HOSPADM

## 2020-11-29 RX ORDER — EPHEDRINE SULFATE 50 MG/ML
5 INJECTION, SOLUTION INTRAVENOUS AS NEEDED
Status: DISCONTINUED | OUTPATIENT
Start: 2020-11-29 | End: 2020-11-30 | Stop reason: HOSPADM

## 2020-11-29 RX ORDER — MAGNESIUM CARB/ALUMINUM HYDROX 105-160MG
30 TABLET,CHEWABLE ORAL ONCE
Status: CANCELLED | OUTPATIENT
Start: 2020-11-29 | End: 2020-11-29

## 2020-11-29 RX ORDER — SODIUM CHLORIDE 0.9 % (FLUSH) 0.9 %
3 SYRINGE (ML) INJECTION EVERY 12 HOURS SCHEDULED
Status: DISCONTINUED | OUTPATIENT
Start: 2020-11-29 | End: 2020-11-30 | Stop reason: HOSPADM

## 2020-11-29 RX ORDER — MISOPROSTOL 100 UG/1
800 TABLET ORAL AS NEEDED
Status: CANCELLED | OUTPATIENT
Start: 2020-11-29

## 2020-11-29 RX ORDER — SODIUM CHLORIDE, SODIUM LACTATE, POTASSIUM CHLORIDE, CALCIUM CHLORIDE 600; 310; 30; 20 MG/100ML; MG/100ML; MG/100ML; MG/100ML
125 INJECTION, SOLUTION INTRAVENOUS CONTINUOUS
Status: DISCONTINUED | OUTPATIENT
Start: 2020-11-29 | End: 2020-11-30

## 2020-11-29 RX ORDER — SODIUM CHLORIDE 0.9 % (FLUSH) 0.9 %
3 SYRINGE (ML) INJECTION EVERY 12 HOURS SCHEDULED
Status: CANCELLED | OUTPATIENT
Start: 2020-11-29

## 2020-11-29 RX ORDER — METHYLERGONOVINE MALEATE 0.2 MG/ML
200 INJECTION INTRAVENOUS ONCE AS NEEDED
Status: CANCELLED | OUTPATIENT
Start: 2020-11-29

## 2020-11-29 RX ADMIN — SODIUM CHLORIDE, POTASSIUM CHLORIDE, SODIUM LACTATE AND CALCIUM CHLORIDE 1000 ML: 600; 310; 30; 20 INJECTION, SOLUTION INTRAVENOUS at 20:05

## 2020-11-29 RX ADMIN — SODIUM CHLORIDE, POTASSIUM CHLORIDE, SODIUM LACTATE AND CALCIUM CHLORIDE 125 ML/HR: 600; 310; 30; 20 INJECTION, SOLUTION INTRAVENOUS at 21:00

## 2020-11-29 RX ADMIN — Medication 2 ML/HR: at 21:15

## 2020-11-30 ENCOUNTER — APPOINTMENT (OUTPATIENT)
Dept: PAIN MEDICINE | Facility: HOSPITAL | Age: 25
End: 2020-11-30

## 2020-11-30 ENCOUNTER — ANESTHESIA EVENT (OUTPATIENT)
Dept: PAIN MEDICINE | Facility: HOSPITAL | Age: 25
End: 2020-11-30

## 2020-11-30 ENCOUNTER — ANESTHESIA (OUTPATIENT)
Dept: PAIN MEDICINE | Facility: HOSPITAL | Age: 25
End: 2020-11-30

## 2020-11-30 ENCOUNTER — APPOINTMENT (OUTPATIENT)
Dept: GENERAL RADIOLOGY | Facility: HOSPITAL | Age: 25
End: 2020-11-30

## 2020-11-30 LAB — BACTERIA SPEC AEROBE CULT: ABNORMAL

## 2020-11-30 PROCEDURE — 0HQ9XZZ REPAIR PERINEUM SKIN, EXTERNAL APPROACH: ICD-10-PCS | Performed by: OBSTETRICS & GYNECOLOGY

## 2020-11-30 PROCEDURE — 25010000002 KETOROLAC TROMETHAMINE PER 15 MG: Performed by: OBSTETRICS & GYNECOLOGY

## 2020-11-30 PROCEDURE — 59410 OBSTETRICAL CARE: CPT | Performed by: OBSTETRICS & GYNECOLOGY

## 2020-11-30 PROCEDURE — 0 IOPAMIDOL 41 % SOLUTION: Performed by: ANESTHESIOLOGY

## 2020-11-30 PROCEDURE — C1755 CATHETER, INTRASPINAL: HCPCS

## 2020-11-30 PROCEDURE — 77003 FLUOROGUIDE FOR SPINE INJECT: CPT

## 2020-11-30 PROCEDURE — S0260 H&P FOR SURGERY: HCPCS | Performed by: OBSTETRICS & GYNECOLOGY

## 2020-11-30 RX ORDER — LANOLIN
CREAM (ML) TOPICAL AS NEEDED
Status: DISCONTINUED | OUTPATIENT
Start: 2020-11-30 | End: 2020-12-02 | Stop reason: HOSPADM

## 2020-11-30 RX ORDER — OXYTOCIN-SODIUM CHLORIDE 0.9% IV SOLN 30 UNIT/500ML 30-0.9/5 UT/ML-%
999 SOLUTION INTRAVENOUS ONCE
Status: COMPLETED | OUTPATIENT
Start: 2020-11-30 | End: 2020-11-30

## 2020-11-30 RX ORDER — SODIUM CHLORIDE 0.9 % (FLUSH) 0.9 %
1-10 SYRINGE (ML) INJECTION AS NEEDED
Status: DISCONTINUED | OUTPATIENT
Start: 2020-11-30 | End: 2020-12-02 | Stop reason: HOSPADM

## 2020-11-30 RX ORDER — PRENATAL VIT/IRON FUM/FOLIC AC 27MG-0.8MG
1 TABLET ORAL DAILY
Status: DISCONTINUED | OUTPATIENT
Start: 2020-11-30 | End: 2020-12-02 | Stop reason: HOSPADM

## 2020-11-30 RX ORDER — OXYCODONE HYDROCHLORIDE AND ACETAMINOPHEN 5; 325 MG/1; MG/1
1 TABLET ORAL EVERY 4 HOURS PRN
Status: DISCONTINUED | OUTPATIENT
Start: 2020-11-30 | End: 2020-12-02 | Stop reason: HOSPADM

## 2020-11-30 RX ORDER — MISOPROSTOL 200 UG/1
800 TABLET ORAL AS NEEDED
Status: DISCONTINUED | OUTPATIENT
Start: 2020-11-30 | End: 2020-11-30 | Stop reason: HOSPADM

## 2020-11-30 RX ORDER — FENTANYL CITRATE 50 UG/ML
50 INJECTION, SOLUTION INTRAMUSCULAR; INTRAVENOUS AS NEEDED
Status: DISCONTINUED | OUTPATIENT
Start: 2020-11-30 | End: 2020-12-02 | Stop reason: HOSPADM

## 2020-11-30 RX ORDER — HYDROCORTISONE 25 MG/G
1 CREAM TOPICAL AS NEEDED
Status: DISCONTINUED | OUTPATIENT
Start: 2020-11-30 | End: 2020-12-02 | Stop reason: HOSPADM

## 2020-11-30 RX ORDER — HYDROXYZINE 50 MG/1
50 TABLET, FILM COATED ORAL NIGHTLY PRN
Status: DISCONTINUED | OUTPATIENT
Start: 2020-11-30 | End: 2020-12-02 | Stop reason: HOSPADM

## 2020-11-30 RX ORDER — SODIUM CHLORIDE 0.9 % (FLUSH) 0.9 %
3-10 SYRINGE (ML) INJECTION AS NEEDED
Status: DISCONTINUED | OUTPATIENT
Start: 2020-11-30 | End: 2020-12-02 | Stop reason: HOSPADM

## 2020-11-30 RX ORDER — IBUPROFEN 600 MG/1
600 TABLET ORAL EVERY 8 HOURS PRN
Status: DISCONTINUED | OUTPATIENT
Start: 2020-11-30 | End: 2020-12-02 | Stop reason: HOSPADM

## 2020-11-30 RX ORDER — KETOROLAC TROMETHAMINE 15 MG/ML
15 INJECTION, SOLUTION INTRAMUSCULAR; INTRAVENOUS EVERY 6 HOURS PRN
Status: DISPENSED | OUTPATIENT
Start: 2020-11-30 | End: 2020-12-01

## 2020-11-30 RX ORDER — CARBOPROST TROMETHAMINE 250 UG/ML
250 INJECTION, SOLUTION INTRAMUSCULAR AS NEEDED
Status: DISCONTINUED | OUTPATIENT
Start: 2020-11-30 | End: 2020-11-30 | Stop reason: HOSPADM

## 2020-11-30 RX ORDER — DOCUSATE SODIUM 100 MG/1
100 CAPSULE, LIQUID FILLED ORAL 2 TIMES DAILY
Status: DISCONTINUED | OUTPATIENT
Start: 2020-11-30 | End: 2020-12-02 | Stop reason: HOSPADM

## 2020-11-30 RX ORDER — OXYTOCIN-SODIUM CHLORIDE 0.9% IV SOLN 30 UNIT/500ML 30-0.9/5 UT/ML-%
250 SOLUTION INTRAVENOUS CONTINUOUS
Status: DISPENSED | OUTPATIENT
Start: 2020-11-30 | End: 2020-11-30

## 2020-11-30 RX ORDER — ONDANSETRON 4 MG/1
4 TABLET, FILM COATED ORAL EVERY 8 HOURS PRN
Status: DISCONTINUED | OUTPATIENT
Start: 2020-11-30 | End: 2020-12-02 | Stop reason: HOSPADM

## 2020-11-30 RX ORDER — METHYLERGONOVINE MALEATE 0.2 MG/ML
200 INJECTION INTRAVENOUS ONCE AS NEEDED
Status: DISCONTINUED | OUTPATIENT
Start: 2020-11-30 | End: 2020-11-30 | Stop reason: HOSPADM

## 2020-11-30 RX ORDER — OXYTOCIN-SODIUM CHLORIDE 0.9% IV SOLN 30 UNIT/500ML 30-0.9/5 UT/ML-%
125 SOLUTION INTRAVENOUS CONTINUOUS PRN
Status: COMPLETED | OUTPATIENT
Start: 2020-11-30 | End: 2020-11-30

## 2020-11-30 RX ORDER — SODIUM CHLORIDE 0.9 % (FLUSH) 0.9 %
3 SYRINGE (ML) INJECTION EVERY 12 HOURS SCHEDULED
Status: DISCONTINUED | OUTPATIENT
Start: 2020-11-30 | End: 2020-12-02 | Stop reason: HOSPADM

## 2020-11-30 RX ORDER — MIDAZOLAM HYDROCHLORIDE 1 MG/ML
1 INJECTION INTRAMUSCULAR; INTRAVENOUS AS NEEDED
Status: DISCONTINUED | OUTPATIENT
Start: 2020-11-30 | End: 2020-12-02 | Stop reason: HOSPADM

## 2020-11-30 RX ORDER — CALCIUM CARBONATE 200(500)MG
3 TABLET,CHEWABLE ORAL 3 TIMES DAILY PRN
Status: DISCONTINUED | OUTPATIENT
Start: 2020-11-30 | End: 2020-12-02 | Stop reason: HOSPADM

## 2020-11-30 RX ORDER — LIDOCAINE HYDROCHLORIDE 10 MG/ML
1 INJECTION, SOLUTION INFILTRATION; PERINEURAL ONCE AS NEEDED
Status: DISCONTINUED | OUTPATIENT
Start: 2020-11-30 | End: 2020-12-02 | Stop reason: HOSPADM

## 2020-11-30 RX ORDER — FAMOTIDINE 20 MG/1
20 TABLET, FILM COATED ORAL DAILY
Status: DISCONTINUED | OUTPATIENT
Start: 2020-11-30 | End: 2020-12-02 | Stop reason: HOSPADM

## 2020-11-30 RX ORDER — BISACODYL 10 MG
10 SUPPOSITORY, RECTAL RECTAL DAILY PRN
Status: DISCONTINUED | OUTPATIENT
Start: 2020-12-01 | End: 2020-12-02 | Stop reason: HOSPADM

## 2020-11-30 RX ADMIN — FAMOTIDINE 20 MG: 20 TABLET, FILM COATED ORAL at 08:54

## 2020-11-30 RX ADMIN — OXYTOCIN 999 ML/HR: 10 INJECTION INTRAVENOUS at 02:34

## 2020-11-30 RX ADMIN — KETOROLAC TROMETHAMINE 15 MG: 15 INJECTION, SOLUTION INTRAMUSCULAR; INTRAVENOUS at 04:27

## 2020-11-30 RX ADMIN — SODIUM CHLORIDE, POTASSIUM CHLORIDE, SODIUM LACTATE AND CALCIUM CHLORIDE 125 ML/HR: 600; 310; 30; 20 INJECTION, SOLUTION INTRAVENOUS at 01:38

## 2020-11-30 RX ADMIN — Medication 1 TABLET: at 08:54

## 2020-11-30 RX ADMIN — OXYCODONE HYDROCHLORIDE AND ACETAMINOPHEN 1 TABLET: 5; 325 TABLET ORAL at 08:57

## 2020-11-30 RX ADMIN — DOCUSATE SODIUM 100 MG: 100 CAPSULE ORAL at 08:54

## 2020-11-30 RX ADMIN — IBUPROFEN 600 MG: 600 TABLET, FILM COATED ORAL at 16:51

## 2020-11-30 RX ADMIN — DOCUSATE SODIUM 100 MG: 100 CAPSULE ORAL at 22:45

## 2020-11-30 RX ADMIN — Medication: at 18:57

## 2020-11-30 RX ADMIN — OXYCODONE HYDROCHLORIDE AND ACETAMINOPHEN 1 TABLET: 5; 325 TABLET ORAL at 22:45

## 2020-11-30 RX ADMIN — OXYCODONE HYDROCHLORIDE AND ACETAMINOPHEN 1 TABLET: 5; 325 TABLET ORAL at 16:51

## 2020-11-30 RX ADMIN — Medication 1 APPLICATION: at 06:23

## 2020-11-30 RX ADMIN — IOPAMIDOL 10 ML: 408 INJECTION, SOLUTION INTRATHECAL at 10:20

## 2020-11-30 RX ADMIN — MINERAL OIL 30 ML: 1000 SOLUTION ORAL at 02:01

## 2020-11-30 RX ADMIN — OXYTOCIN 125 ML/HR: 10 INJECTION INTRAVENOUS at 03:57

## 2020-11-30 NOTE — ANESTHESIA PROCEDURE NOTES
Blood Patch    Pre-sedation assessment completed: 11/30/2020 10:16 AM    Patient reassessed immediately prior to procedure    Patient location during procedure: pain clinic  Blood patch placed: 11/30/2020 10:16 AM  Stop Time:11/30/2020 10:23 AM    Staff  Anesthesiologist: Kameron Juarez MD  Preanesthetic Checklist  Completed: patient identified, site marked, surgical consent, pre-op evaluation, timeout performed, IV checked, risks and benefits discussed and monitors and equipment checked  Blood Patch Prep  Sterile Tech: sterile barrier, cap and gloves.  Prep: ChloraPrep  Patient monitoring: EKG, continuous pulse ox and blood pressure monitoring  Location: L3-L4  Blood Patch Procedure  Sedation:no    Approach: left paramedian   Needle Type: Tuohy  Amount injected: 15-20  Assessment  Patient tolerance:patient tolerated the procedure well with no apparent complications  Complications:no  Additional Notes  Epidural steroid injection was performed at the L3-4 level utilizing a left paramedian approach.  This did correspond to the level of what appeared to be where the epidural placement was performed.  There is next loss resistance to injection.  Isovue was injected demonstrating cranial caudal spread of contrast media.  I then injected somewhere between 15 and 20 mL of autologous blood that was collected.  She began having left-sided leg pain time I ceased injecting.  After a few minutes the pain significantly improved.  She otherwise tolerated the procedure well.

## 2020-11-30 NOTE — ANESTHESIA PROCEDURE NOTES
Labor Epidural      Patient reassessed immediately prior to procedure    Patient location during procedure: OB  Start Time: 11/29/2020 9:00 PM  Stop Time: 11/29/2020 9:21 PM  Indication:at surgeon's request  Performed By  Anesthesiologist: Genet Corado MD  Preanesthetic Checklist  Completed: patient identified, site marked, surgical consent, pre-op evaluation, timeout performed, IV checked, risks and benefits discussed and monitors and equipment checked  Additional Notes  Catheter placed w/ ease, + aspiration, 1 ml bolus dose, HR stable, + numbness in toes; kept catheter in place, pump @ 2ml/hour NO bolus or PCEA programmed; Velia KRUEGER informed; pump labeled INTRATHECAL catheter, d/w pt headache, fluids, caffeine & potential need for epidural blood patch.  Will monitor & adjust dosing as necessary.    Prep:  Pt Position:sitting  Sterile Tech:cap, gloves, mask and sterile barrier  Prep:chlorhexidine gluconate and isopropyl alcohol  Monitoring:blood pressure monitoring, continuous pulse oximetry and EKG  Epidural Block Procedure:  Approach:midline  Guidance:landmark technique  Location:L4-L5  Needle Type:Tuohy  Needle Gauge:17  Loss of Resistance Medium: saline  Loss of Resistance: 8cm  Cath Depth at skin:12 cm  Paresthesia: none  Aspiration:positive  Test Dose:positive  Number of Attempts: 1  Post Assessment:  Dressing:occlusive dressing applied and secured with tape  Pt Tolerance:patient tolerated the procedure well with no apparent complications  Complications:no

## 2020-12-01 LAB
BASOPHILS # BLD AUTO: 0.05 10*3/MM3 (ref 0–0.2)
BASOPHILS NFR BLD AUTO: 0.5 % (ref 0–1.5)
DEPRECATED RDW RBC AUTO: 40.4 FL (ref 37–54)
EOSINOPHIL # BLD AUTO: 0.13 10*3/MM3 (ref 0–0.4)
EOSINOPHIL NFR BLD AUTO: 1.3 % (ref 0.3–6.2)
ERYTHROCYTE [DISTWIDTH] IN BLOOD BY AUTOMATED COUNT: 13 % (ref 12.3–15.4)
HCT VFR BLD AUTO: 32.3 % (ref 34–46.6)
HGB BLD-MCNC: 11.1 G/DL (ref 12–15.9)
IMM GRANULOCYTES # BLD AUTO: 0.04 10*3/MM3 (ref 0–0.05)
IMM GRANULOCYTES NFR BLD AUTO: 0.4 % (ref 0–0.5)
LYMPHOCYTES # BLD AUTO: 2.36 10*3/MM3 (ref 0.7–3.1)
LYMPHOCYTES NFR BLD AUTO: 23.5 % (ref 19.6–45.3)
MCH RBC QN AUTO: 29.6 PG (ref 26.6–33)
MCHC RBC AUTO-ENTMCNC: 34.4 G/DL (ref 31.5–35.7)
MCV RBC AUTO: 86.1 FL (ref 79–97)
MONOCYTES # BLD AUTO: 0.71 10*3/MM3 (ref 0.1–0.9)
MONOCYTES NFR BLD AUTO: 7.1 % (ref 5–12)
NEUTROPHILS NFR BLD AUTO: 6.77 10*3/MM3 (ref 1.7–7)
NEUTROPHILS NFR BLD AUTO: 67.2 % (ref 42.7–76)
NRBC BLD AUTO-RTO: 0 /100 WBC (ref 0–0.2)
PLATELET # BLD AUTO: 197 10*3/MM3 (ref 140–450)
PMV BLD AUTO: 10.5 FL (ref 6–12)
RBC # BLD AUTO: 3.75 10*6/MM3 (ref 3.77–5.28)
WBC # BLD AUTO: 10.06 10*3/MM3 (ref 3.4–10.8)

## 2020-12-01 PROCEDURE — 0503F POSTPARTUM CARE VISIT: CPT | Performed by: OBSTETRICS & GYNECOLOGY

## 2020-12-01 PROCEDURE — 85025 COMPLETE CBC W/AUTO DIFF WBC: CPT | Performed by: OBSTETRICS & GYNECOLOGY

## 2020-12-01 RX ADMIN — OXYCODONE HYDROCHLORIDE AND ACETAMINOPHEN 1 TABLET: 5; 325 TABLET ORAL at 07:45

## 2020-12-01 RX ADMIN — IBUPROFEN 600 MG: 600 TABLET, FILM COATED ORAL at 16:45

## 2020-12-01 RX ADMIN — FAMOTIDINE 20 MG: 20 TABLET, FILM COATED ORAL at 08:03

## 2020-12-01 RX ADMIN — DOCUSATE SODIUM 100 MG: 100 CAPSULE ORAL at 08:04

## 2020-12-01 RX ADMIN — DOCUSATE SODIUM 100 MG: 100 CAPSULE ORAL at 21:02

## 2020-12-01 RX ADMIN — OXYCODONE HYDROCHLORIDE AND ACETAMINOPHEN 1 TABLET: 5; 325 TABLET ORAL at 14:38

## 2020-12-01 RX ADMIN — Medication 1 TABLET: at 08:03

## 2020-12-01 RX ADMIN — OXYCODONE HYDROCHLORIDE AND ACETAMINOPHEN 1 TABLET: 5; 325 TABLET ORAL at 21:02

## 2020-12-01 RX ADMIN — IBUPROFEN 600 MG: 600 TABLET, FILM COATED ORAL at 07:45

## 2020-12-01 NOTE — LACTATION NOTE
This note was copied from a baby's chart.  Tried latching baby several times but he would get upset and cry as he instead prefers the pacifier. Mom's nipples are too wide to fit our nipple shields. She brought one with her but it is does not allow for breast stimulation as it is long and resembles nipple for a bottle. Encouraged to keep attempting latches and pumping every 3 hrs.

## 2020-12-01 NOTE — PROGRESS NOTES
Postpartum Progress Note      Status post Vaginal Delivery: Doing well postoperatively.     1) postpartum care immediately following delivery : Meeting postpartum milestones.  2) Spinal headache--symptoms resolved after blood patch.    Rh status: O pos  Rubella: Immune  Gender: male--in NICU    Subjective     Postpartum Day 1: Vaginal delivery    The patient feels well. The patient denies emotional concerns. Pain is well controlled with current medications. The baby is well. The patient is ambulating well. The patient is tolerating a normal diet.     Objective     Vital signs in last 24 hours:  Temp:  [97 °F (36.1 °C)-97.9 °F (36.6 °C)] 97 °F (36.1 °C)  Heart Rate:  [74-98] 81  Resp:  [16] 16  BP: (111-127)/(69-77) 111/69      General:    alert, appears stated age and cooperative   Abdomen:  Soft, Non-tender    Lochia:  appropriate   Uterine Fundus:   firm   Ext    Edema 1+   DVT Evaluation:  No evidence of DVT seen on physical exam.     Lab Results   Component Value Date    WBC 10.06 12/01/2020    HGB 11.1 (L) 12/01/2020    HCT 32.3 (L) 12/01/2020    MCV 86.1 12/01/2020     12/01/2020       Chata Hinds MD  12/1/2020  11:24 EST

## 2020-12-01 NOTE — PLAN OF CARE
Problem: Adult Inpatient Plan of Care  Goal: Plan of Care Review  Outcome: Ongoing, Progressing  Flowsheets  Taken 2020 0432 by Lore Bhat RN  Plan of Care Reviewed With: patient  Taken 2020 1704 by Aline Sargent RN  Progress: improving  Goal: Patient-Specific Goal (Individualized)  Outcome: Ongoing, Progressing  Goal: Absence of Hospital-Acquired Illness or Injury  Outcome: Ongoing, Progressing  Intervention: Identify and Manage Fall Risk  Recent Flowsheet Documentation  Taken 2020 042 by Lore Bhat RN  Safety Promotion/Fall Prevention: safety round/check completed  Taken 2020 by Lore Bhat RN  Safety Promotion/Fall Prevention: safety round/check completed  Goal: Optimal Comfort and Wellbeing  Outcome: Ongoing, Progressing  Goal: Readiness for Transition of Care  Outcome: Ongoing, Progressing     Problem:  Fall Injury Risk  Goal: Absence of Fall, Infant Drop and Related Injury  Outcome: Ongoing, Progressing  Intervention: Promote Injury-Free Environment  Recent Flowsheet Documentation  Taken 2020 042 by Lore Bhat RN  Safety Promotion/Fall Prevention: safety round/check completed  Taken 2020 by Lore Bhat RN  Safety Promotion/Fall Prevention: safety round/check completed     Problem: Skin Injury Risk Increased  Goal: Skin Health and Integrity  Outcome: Ongoing, Progressing     Problem: Adjustment to Role Transition (Postpartum Vaginal Delivery)  Goal: Successful Maternal Role Transition  Outcome: Ongoing, Progressing  Intervention: Support Maternal Role Transition  Recent Flowsheet Documentation  Taken 2020 by Lore Bhat RN  Supportive Measures: active listening utilized  Parent/Child Attachment Promotion:   caring behavior modeled   strengths emphasized   skin-to-skin contact encouraged   rooming-in promoted     Problem: Bleeding (Postpartum Vaginal Delivery)  Goal: Hemostasis  Outcome: Ongoing, Progressing      Problem: Infection (Postpartum Vaginal Delivery)  Goal: Absence of Infection Signs and Symptoms  Outcome: Ongoing, Progressing     Problem: Pain (Postpartum Vaginal Delivery)  Goal: Acceptable Pain Control  Outcome: Ongoing, Progressing     Problem: Urinary Retention (Postpartum Vaginal Delivery)  Goal: Effective Urinary Elimination  Outcome: Ongoing, Progressing  Intervention: Promote Effective Urinary Elimination  Recent Flowsheet Documentation  Taken 11/30/2020 2053 by Lore Bhat RN  Urinary Elimination Promotion: frequent voiding encouraged     Problem: Breastfeeding  Goal: Effective Breastfeeding  Outcome: Ongoing, Progressing  Intervention: Promote Effective Breastfeeding  Recent Flowsheet Documentation  Taken 11/30/2020 2053 by Lore Bhat RN  Parent/Child Attachment Promotion:   caring behavior modeled   strengths emphasized   skin-to-skin contact encouraged   rooming-in promoted  Intervention: Support Exclusive Breastfeeding Success  Recent Flowsheet Documentation  Taken 11/30/2020 2053 by Lore Bhat RN  Supportive Measures: active listening utilized   Goal Outcome Evaluation:  Plan of Care Reviewed With: patient  Progress: improving

## 2020-12-01 NOTE — LACTATION NOTE
This note was copied from a baby's chart.  Personal pump delivered, provided with sterilizer bag.

## 2020-12-01 NOTE — LACTATION NOTE
This note was copied from a baby's chart.  Mom continues to pump and reports baby latched for a few minutes. Encouraged pumping every 3 hrs and call for any concerns/questions.

## 2020-12-02 VITALS
BODY MASS INDEX: 38.77 KG/M2 | WEIGHT: 247 LBS | DIASTOLIC BLOOD PRESSURE: 74 MMHG | HEART RATE: 81 BPM | HEIGHT: 67 IN | TEMPERATURE: 98 F | RESPIRATION RATE: 16 BRPM | OXYGEN SATURATION: 97 % | SYSTOLIC BLOOD PRESSURE: 120 MMHG

## 2020-12-02 PROCEDURE — 0503F POSTPARTUM CARE VISIT: CPT | Performed by: OBSTETRICS & GYNECOLOGY

## 2020-12-02 RX ORDER — OXYCODONE HYDROCHLORIDE AND ACETAMINOPHEN 5; 325 MG/1; MG/1
1 TABLET ORAL EVERY 4 HOURS PRN
Qty: 8 TABLET | Refills: 0 | Status: SHIPPED | OUTPATIENT
Start: 2020-12-02 | End: 2020-12-07

## 2020-12-02 RX ADMIN — Medication 1 APPLICATION: at 11:44

## 2020-12-02 RX ADMIN — Medication 1 TABLET: at 09:01

## 2020-12-02 RX ADMIN — DOCUSATE SODIUM 100 MG: 100 CAPSULE ORAL at 09:01

## 2020-12-02 RX ADMIN — FAMOTIDINE 20 MG: 20 TABLET, FILM COATED ORAL at 09:01

## 2020-12-02 RX ADMIN — OXYCODONE HYDROCHLORIDE AND ACETAMINOPHEN 1 TABLET: 5; 325 TABLET ORAL at 13:28

## 2020-12-02 RX ADMIN — IBUPROFEN 600 MG: 600 TABLET, FILM COATED ORAL at 09:01

## 2020-12-02 RX ADMIN — OXYCODONE HYDROCHLORIDE AND ACETAMINOPHEN 1 TABLET: 5; 325 TABLET ORAL at 09:01

## 2020-12-02 RX ADMIN — OXYCODONE HYDROCHLORIDE AND ACETAMINOPHEN 1 TABLET: 5; 325 TABLET ORAL at 01:15

## 2020-12-02 RX ADMIN — IBUPROFEN 600 MG: 600 TABLET, FILM COATED ORAL at 01:15

## 2020-12-02 NOTE — PLAN OF CARE
Goal Outcome Evaluation:  Plan of Care Reviewed With: patient  Progress: improving  Outcome Summary: VSS, pain controlled with motrin and percocet. bleeding scant. Will CTM.

## 2020-12-02 NOTE — PLAN OF CARE
Goal Outcome Evaluation:  Plan of Care Reviewed With: patient, significant other  Progress: improving  Outcome Summary: AVSS, amb/voiding without difficulty, good pain control with po pain medication, using EBP, infant in NICU, pt being discharged to boarder status, all discharge instructions given and explained all questions and concerns addressed

## 2020-12-02 NOTE — PROGRESS NOTES
Discharge Planning Assessment  [facility]     Patient Name: Kristin Jones  MRN: [id number]  Today's Date: 12/2/2020    Admit Date: 11/29/2020    Discharge Needs Assessment    No documentation.       Discharge Plan     Row Name 12/02/20 1145       Plan    Plan  Infant to discharge home with mother when medically ready. CSW will follow for cord toxicology results. JESUS Soriano    Plan Comments  Mother: Kristin Jones, MRN [id number]; Infant: Lucas Jones, MRN [id number]. CSW was consulted for “Admit to use of THC, No urine on baby in computer, cord sent.No urine seen on Momsince admission.” Of note, no urine toxicology obtained on mother or infant on admission. Cord toxicology was sent. CSW will follow for cord toxicology results and will report to CPS if warranted. Of note, mother had a negative prenatal urine toxicology on 11/3/20 and a prenatal urine toxicology positive for THC on 4/14/20. Infant in NICU and does not appear to qualify for SSI for low birth weight. CSW spoke with mother’s RN Nikkie ARAUZ who advised she has no concerns regarding mother. CSW will attempt to follow up with mother at a later time to complete assessment, as infant is in the NICU. CSW will follow for cord toxicology results and will report to CPS if warranted. JESUS Soriano        Continued Care and Services - Admitted Since 11/29/2020    Coordination has not been started for this encounter.         Demographic Summary     Row Name 12/02/20 1144       General Information    Admission Type  inpatient    Arrived From  home    Referral Source  nursing    Reason for Consult  substance use concerns;psychosocial concerns    General Information Comments  Admit to use of THC, No urine on baby in computer, cord sent.No urine seen on Momsince admission        Functional Status    No documentation.       Psychosocial    No documentation.       Abuse/Neglect    No documentation.       Legal    No documentation.       Substance  Abuse    No documentation.       Patient Forms    No documentation.           NINA Soriano

## 2020-12-02 NOTE — PROGRESS NOTES
"Subjective:    Cc:  Postpartum    Postpartum Day 2:     The patient feels well.  Pain is well controlled with current medications. The baby is making excellent progress in NICU.  Urinary output is adequate. The patient is ambulating well. The patient is tolerating a normal diet. Flatus has been passed.      Objective:    Vital signs in last 24 hours:  Blood pressure 120/74, pulse 81, temperature 98 °F (36.7 °C), temperature source Oral, resp. rate 16, height 170.2 cm (67\"), weight 112 kg (247 lb), last menstrual period 02/25/2020, SpO2 97 %, currently breastfeeding.      General:    alert, appears stated age and cooperative   Uterine Fundus:   firm     Labs    Rh + / Male infant in NICU.    Assessment/Plan:.     Postpartum Day #2  - Discharge home.  Reviewed instructions, follow up and restrictions.      Shawn Melo MD    "

## 2020-12-02 NOTE — LACTATION NOTE
This note was copied from a baby's chart.  Mom got caught up on some much needed sleep last night and skipped some pumping sessions. Encouraged pumping every 2 hr for a few times today then continue every 3 hrs. Discussed moving HGP to NICU after her d/c today if boarder room unavailable. Will call for any assistance or questions also discussed [facility] and gave card.

## 2020-12-05 ENCOUNTER — TELEPHONE (OUTPATIENT)
Dept: LACTATION | Facility: HOSPITAL | Age: 25
End: 2020-12-05

## 2021-01-15 ENCOUNTER — POSTPARTUM VISIT (OUTPATIENT)
Dept: OBSTETRICS AND GYNECOLOGY | Facility: CLINIC | Age: 26
End: 2021-01-15

## 2021-01-15 VITALS
BODY MASS INDEX: 36.88 KG/M2 | DIASTOLIC BLOOD PRESSURE: 69 MMHG | SYSTOLIC BLOOD PRESSURE: 110 MMHG | HEIGHT: 67 IN | WEIGHT: 235 LBS | HEART RATE: 85 BPM

## 2021-01-15 PROCEDURE — 0503F POSTPARTUM CARE VISIT: CPT | Performed by: OBSTETRICS & GYNECOLOGY

## 2021-01-15 NOTE — PROGRESS NOTES
Answers for HPI/ROS submitted by the patient on 2021   What is the primary reason for your visit?: Other  Please describe your symptoms.: Postpartum Check Up  Have you had these symptoms before?: No  How long have you been having these symptoms?: Greater than 2 weeks  Please list any medications you are currently taking for this condition.: None  Please describe any probable cause for these symptoms. : I gave birth 6 weeks ago.  Chief Complaint   Patient presents with   • Postpartum Care     declines contraception         SUBJECTIVE:   Kristin Jones is a 25 y.o.  who presents s/p  Spontaneous Vaginal Delivery on 2020.  Reports no issues  Bowel and bladder function have returned to normal  Mood changes reports her mood seems stable. She is seeing a counselor and feels well supported.  Baby boy is doing well.   Breast and bottle feeding   Contraception: Declines, was on mirena IUD in the past (reports had depression and weight gain with this). She would like to do condoms.      OB History    Para Term  AB Living   1 1 1     1   SAB TAB Ectopic Molar Multiple Live Births           0 1      # Outcome Date GA Lbr Ananda/2nd Weight Sex Delivery Anes PTL Lv   1 Term 20 39w6d 06:20 / 00:48 3711 g (8 lb 2.9 oz) M Vag-Spont Spinal N CORBIN      Birth Comments: Infant Scale 2          Past Medical History:   Diagnosis Date   • Chlamydia    • Depression    • Low blood sugar      Past Surgical History:   Procedure Laterality Date   • WISDOM TOOTH EXTRACTION  2015     Social History     Tobacco Use   • Smoking status: Light Tobacco Smoker     Packs/day: 0.50     Years: 10.00     Pack years: 5.00     Types: Cigarettes   • Smokeless tobacco: Never Used   Substance Use Topics   • Alcohol use: Not Currently   • Drug use: Yes     Types: Marijuana     Comment: cutting down.  denies use.     Family History   Problem Relation Age of Onset   • Breast cancer Mother 40        survivor   •  "Cervical cancer Sister         40's   • Colon cancer Paternal Grandmother         80's   • Melanoma Maternal Uncle    • Prostate cancer Paternal Uncle    • Diabetes Maternal Aunt    • Hypertension Father    • Ovarian cancer Neg Hx    • Uterine cancer Neg Hx    • Pulmonary embolism Neg Hx    • Deep vein thrombosis Neg Hx        Patient Active Problem List   Diagnosis   • Term pregnancy        OBJECTIVE:   /69   Pulse 85   Ht 170.2 cm (67.01\")   Wt 107 kg (235 lb)   Breastfeeding Yes   BMI 36.80 kg/m²    Physical Examination:   General appearance - alert, well appearing, and in no distress  Breasts - normal, no masses bilaterally, no nipple retraction, no lactation  Chest - no tachypnea, retractions or cyanosis  Heart - normal rate and regular rhythm  Abdomen - soft, nontender, nondistended, no masses or organomegaly;   Pelvic - normal external genitalia, vulva, vagina, cervix, uterus and adnexa, no bleeding  Neurological - screening mental status exam normal  Musculoskeletal - no joint tenderness, deformity or swelling  Psychiatric - normal mood and affect    Lab Results   Component Value Date    WBC 10.06 2020    HGB 11.1 (L) 2020    HCT 32.3 (L) 2020    MCV 86.1 2020     2020        ASSESSMENT:   S/p   Breast irritation, resolved    PLAN:   Doing well postpartum  Baby boy doing well  Contraception: declines hormonal contraception - prefers condoms. Counseled on healthy inter pregnancy interval  At this time, may resume normal activity including intercourse and lifting.  Reviewed normal precautions.  Reviewed last pap smear 2020- NIL  Recommended follow up for annual exam or sooner with problems  Breast issue- resolved. She is no longer having the pain or redness she was having in her pregnancy. She has had normal ultrasounds. Recommend follow up with any further issues. Breast exam today within normal limits   Encouraged smoking cessation, patient not " interested in quitting at this time.

## 2021-01-22 ENCOUNTER — TELEPHONE (OUTPATIENT)
Dept: OBSTETRICS AND GYNECOLOGY | Facility: CLINIC | Age: 26
End: 2021-01-22

## 2021-01-22 NOTE — TELEPHONE ENCOUNTER
L/m with  at [facility], Syl Mohan's office to return my call.  Pt was in office recently, inquired if she was referred to another provider for breast f/u.  Dr. Jacome referred her to general surgery, but pt could not schedule until after she saw a PCP.  Pt seemed certain Syl Mohan referred to a doctor named Dr. Arciniega, something.    I also spoke with radiologist, Steve Lopez, he confirmed pt had a negative breast us report, and they did not recommend she f/u at this time.    Fairly certain she is thinking of our referral to gen surg-waiting for call back from outside office to confirm.     Pt # [phone]

## 2021-01-26 NOTE — TELEPHONE ENCOUNTER
Spoke with pt to inform her I never heard back from Dr. Mohan's office regarding breast f/u.  Encouraged pt to call the surgeons office (states she was seen at [facility]) and/or Dr. Mohan's office to see if either of their offices scheduled/referred her.  Informed pt we can take over the follow up for her, but the other offices would need to send us their records and recommendations.  Pt will call back if we need to schedule for her.

## 2021-04-16 ENCOUNTER — BULK ORDERING (OUTPATIENT)
Dept: CASE MANAGEMENT | Facility: OTHER | Age: 26
End: 2021-04-16

## 2021-04-16 DIAGNOSIS — Z23 IMMUNIZATION DUE: ICD-10-CM

## 2021-05-10 NOTE — H&P
"   History and Physical      Patient Name: Kristin Jones   Patient ID: [id number]   Sex: Female   YOB: 1995    Primary Care Provider: YESSICA RODRIGUEZ   Referring Provider: YESSICA RODRIGUEZ    Visit Date: August 31, 2020    Provider: Suze Harris MD   Location[facility]   Location Address: [address] [place] [address]  [id number]   Location Phone: [phone]          Chief Complaint  · Tender and dense breasts bilaterally right worse than left with palpable upper outer area       History Of Present Illness  Kristin Jones is a 25 year old /White female who is referred from YESSICA RODRIGUEZ ; very pleasant young lady who is 27 weeks pregnant and has a right breast upper outer dense area of tissue that is very tender to palpation. She has had some erythema and nipple areolar changes. She has had ultrasounds that showed no lesions.             Past Medical History  Breast lump; GERD         Medication List  prenat vit-iron cb-FA-docusate oral         Allergy List  Bactrim; doxycycline monohydrate       Allergies Reconciled  Family Medical History  Diabetes, unspecified type; Family history of colon cancer; Family history of breast cancer         Social History  Tobacco         Review of Systems  · Constitutional  o Denies  o : fever, chills  · Breasts  o * See HPI  · Cardiovascular  o Denies  o : chest pain, cardiac murmurs, irregular heart beats, dyspnea on exertion  · Integument  o Denies  o : rash, itching, new skin lesions  · Heme-Lymph  o Denies  o : easy bleeding, easy bruising, lymph node enlargement or tenderness      Vitals  Date Time BP Position Site L\R Cuff Size HR RR TEMP (F) WT  HT  BMI kg/m2 BSA m2 O2 Sat HC       08/31/2020 10:02 AM       16  222lbs 16oz 5'  7\" 34.93 2.19           Physical Examination  · Constitutional  o Appearance  o : A well-nourished, well-developed patient who ambulates without difficulty, Alert and Oriented " X3.  · Respiratory  o Respiratory Effort  o : breathing unlabored  o Inspection of Chest  o : breaths equal bilaterally  · Breasts  o Inspection of Breasts  o : developmental state normal for age  o Palpation of Breasts, Axillae  o : dense area in right upper outer quadrant with tenderness to palpation ( right worse than left), no skin changes          Assessment  · Breast Mass     611.72/N63      Plan  · Orders  o Breast Ultrasound Right Complete [facility] ([id number]) - 611.72/N63 - 09/22/2020  · Medications  o Medications have been Reconciled  o Transition of Care or Provider Policy  · Instructions  o PLAN:  o Pt cannot get an MRI due to pregnancy so will repeat ultrasound and exam in 3 weeks, due to her family hx in her mother she will need to start screenings early and may need genetic testing and additional MRI evaluations            Electronically Signed by: Suze Harris MD -Author on August 31, 2020 11:52:39 AM

## 2021-05-14 VITALS — BODY MASS INDEX: 35 KG/M2 | HEIGHT: 67 IN | RESPIRATION RATE: 16 BRPM | WEIGHT: 223 LBS

## 2022-01-17 NOTE — PROGRESS NOTES
"Chief Complaint   Patient presents with   • Gynecologic Exam     here for annual exam        Kristin CHAYA Jones is a 26 y.o.  who presents for an annual examination   Reports worsening back pain - taking Flexeril, has done PT before but it has been 2018.  The back pain is radiating up. She feels that it is worse after pregnancy and with carrying her son.    Pap history:  Last pap: 2020 NIL  Prior abnormal paps: no  STDs  Sexually active: yes  History of STDs: yes, chlamydia  Has had HPV vaccine: yes  Contraception:  condoms  Breast imaging in , reports she gets a \"random pain\" in her breast that is infrequent and bilateral  Saw breast surgeon in [facility]    Screening for BRCA-   Is patient's family history significant for BRCA risk factors? yes, reports through PCP and was negative  Mom had breast cancer at young age    Past Medical History:   Diagnosis Date   • Chlamydia    • Depression    • Low blood sugar    • Spinal headache 20    2012: spinal tap, : from epidural     Past Surgical History:   Procedure Laterality Date   • WISDOM TOOTH EXTRACTION  2015     OB History    Para Term  AB Living   1 1 1     1   SAB IAB Ectopic Molar Multiple Live Births             1      # Outcome Date GA Lbr Ananda/2nd Weight Sex Delivery Anes PTL Lv   1 Term 20 39w6d 06:20  00:48 3711 g (8 lb 2.9 oz) M Vag-Spont Spinal N CORBIN      Birth Comments: Infant Scale 2      Social History     Tobacco Use   • Smoking status: Light Tobacco Smoker     Packs/day: 0.50     Years: 10.00     Pack years: 5.00     Types: Cigarettes   • Smokeless tobacco: Never Used   Substance Use Topics   • Alcohol use: Yes     Comment: rarely   • Drug use: Yes     Types: Marijuana     Comment: cutting down.  denies use.     Family History   Problem Relation Age of Onset   • Breast cancer Mother 40        survivor   • Cervical cancer Sister         40's   • Colon cancer Paternal Grandmother         " "80's   • Melanoma Maternal Uncle    • Prostate cancer Paternal Uncle    • Diabetes Maternal Aunt    • Hypertension Father    • Liver cancer Father    • Lung cancer Father    • Bone cancer Father    • Cancer Maternal Grandmother         [place]   • Lung cancer Maternal Grandmother         double   • Ovarian cancer Neg Hx    • Uterine cancer Neg Hx    • Pulmonary embolism Neg Hx    • Deep vein thrombosis Neg Hx      Current Outpatient Medications on File Prior to Visit   Medication Sig Dispense Refill   • cholecalciferol (VITAMIN D3) 25 MCG (1000 UT) tablet      • cyclobenzaprine (FLEXERIL) 5 MG tablet      • [DISCONTINUED] Prenatal Vit-Fe Fumarate-FA (PRENATAL VITAMIN) 27-0.8 MG tablet Take  by mouth.       No current facility-administered medications on file prior to visit.     Allergies   Allergen Reactions   • Doxycycline GI Intolerance   • Kiwi Extract Other (See Comments)     Oral swelling   • Sulfamethoxazole-Trimethoprim GI Intolerance   • Nickel Dermatitis, Itching and Rash        Review of Systems  See HPI    OBJECTIVE:   Vitals:    01/18/22 1013   BP: 116/66   Pulse: 101   Weight: 81.2 kg (179 lb)   Height: 170.2 cm (67\")      Physical Exam  Exam conducted with a chaperone present.   Constitutional:       General: She is not in acute distress.     Appearance: She is well-developed. She is not diaphoretic.   HENT:      Head: Normocephalic and atraumatic.   Neck:      Thyroid: No thyromegaly.      Trachea: No tracheal deviation.   Cardiovascular:      Rate and Rhythm: Normal rate.      Heart sounds: Normal heart sounds. No murmur heard.  No friction rub. No gallop.    Pulmonary:      Effort: Pulmonary effort is normal. No respiratory distress.      Breath sounds: Normal breath sounds.   Chest:      Chest wall: No tenderness.   Breasts:      Right: No inverted nipple, mass, nipple discharge, skin change or tenderness.      Left: No inverted nipple, mass, nipple discharge, skin change or tenderness. "       Abdominal:      General: There is no distension.      Palpations: Abdomen is soft. There is no mass.      Tenderness: There is no abdominal tenderness.   Genitourinary:     General: Normal vulva.      Labia:         Right: No rash, lesion or injury.         Left: No rash, lesion or injury.       Vagina: No vaginal discharge, tenderness or bleeding.      Cervix: No cervical motion tenderness, discharge or friability.      Uterus: Not deviated, not enlarged, not fixed and not tender.       Adnexa:         Right: No mass, tenderness or fullness.          Left: No mass, tenderness or fullness.     Musculoskeletal:         General: No deformity. Normal range of motion.   Lymphadenopathy:      Cervical: No cervical adenopathy.   Skin:     General: Skin is warm and dry.      Findings: No rash.   Neurological:      Mental Status: She is alert and oriented to person, place, and time.   Psychiatric:         Behavior: Behavior normal.         Thought Content: Thought content normal.         Judgment: Judgment normal.         ASSESSMENT/PLAN:     Annual well woman exam:  Cervical cancer screening:    Denies cervical dysplasia in past   HPV vaccination completed   The patient is due for a pap in 2023.    Screening guidelines discussed with patient  Breast cancer screening:    Clinical breast exam recommended for age 20-39 years every 1-3 years   Mammogram recommended starting age 40. Last US in Aug 2020 normal, negative. Clinical exam benign   Breast self awareness encouraged  STD Screening   Testing declined.    Contraception :   Condoms    Family history    Reports negative testing with PCP  Healthy lifestyle counseling:   return for routine annual checkups    Will make PT referral for back pain  BMI Counseling  Body mass index is 28.04 kg/m².       Return in about 1 year (around 1/18/2023) for Annual physical.

## 2022-01-18 ENCOUNTER — OFFICE VISIT (OUTPATIENT)
Dept: OBSTETRICS AND GYNECOLOGY | Facility: CLINIC | Age: 27
End: 2022-01-18

## 2022-01-18 VITALS
BODY MASS INDEX: 28.09 KG/M2 | HEIGHT: 67 IN | DIASTOLIC BLOOD PRESSURE: 66 MMHG | WEIGHT: 179 LBS | SYSTOLIC BLOOD PRESSURE: 116 MMHG | HEART RATE: 101 BPM

## 2022-01-18 DIAGNOSIS — Z01.419 WELL WOMAN EXAM WITH ROUTINE GYNECOLOGICAL EXAM: Primary | ICD-10-CM

## 2022-01-18 DIAGNOSIS — M54.42 CHRONIC LOW BACK PAIN WITH BILATERAL SCIATICA, UNSPECIFIED BACK PAIN LATERALITY: ICD-10-CM

## 2022-01-18 DIAGNOSIS — G89.29 CHRONIC LOW BACK PAIN WITH BILATERAL SCIATICA, UNSPECIFIED BACK PAIN LATERALITY: ICD-10-CM

## 2022-01-18 DIAGNOSIS — M54.41 CHRONIC LOW BACK PAIN WITH BILATERAL SCIATICA, UNSPECIFIED BACK PAIN LATERALITY: ICD-10-CM

## 2022-01-18 PROBLEM — M54.50 LOW BACK PAIN: Status: ACTIVE | Noted: 2021-10-21

## 2022-01-18 PROBLEM — F17.200 SMOKER: Status: ACTIVE | Noted: 2021-10-21

## 2022-01-18 PROBLEM — N63.0 BREAST LUMP: Status: ACTIVE | Noted: 2022-01-18

## 2022-01-18 PROBLEM — F41.1 GENERALIZED ANXIETY DISORDER: Status: ACTIVE | Noted: 2021-10-21

## 2022-01-18 PROBLEM — M40.50 LORDOSIS: Status: ACTIVE | Noted: 2021-10-21

## 2022-01-18 PROBLEM — E66.9 OBESITY: Status: ACTIVE | Noted: 2021-10-21

## 2022-01-18 PROBLEM — G43.009 MIGRAINE WITHOUT AURA, NOT REFRACTORY: Status: ACTIVE | Noted: 2021-10-21

## 2022-01-18 PROBLEM — N94.6 DYSMENORRHEA: Status: ACTIVE | Noted: 2021-10-21

## 2022-01-18 PROCEDURE — 3008F BODY MASS INDEX DOCD: CPT | Performed by: OBSTETRICS & GYNECOLOGY

## 2022-01-18 PROCEDURE — 99395 PREV VISIT EST AGE 18-39: CPT | Performed by: OBSTETRICS & GYNECOLOGY

## 2022-01-18 PROCEDURE — 2014F MENTAL STATUS ASSESS: CPT | Performed by: OBSTETRICS & GYNECOLOGY

## 2022-01-18 RX ORDER — MELATONIN
1000 DAILY
COMMUNITY
Start: 2022-01-03

## 2022-01-18 RX ORDER — CYCLOBENZAPRINE HCL 5 MG
10 TABLET ORAL 3 TIMES DAILY PRN
COMMUNITY
Start: 2022-01-04 | End: 2022-08-25

## 2022-02-01 ENCOUNTER — HOSPITAL ENCOUNTER (OUTPATIENT)
Dept: PHYSICAL THERAPY | Facility: HOSPITAL | Age: 27
Setting detail: THERAPIES SERIES
Discharge: HOME | End: 2022-02-01

## 2022-02-01 DIAGNOSIS — N62 MACROMASTIA: ICD-10-CM

## 2022-02-01 DIAGNOSIS — M54.2 CHRONIC NECK PAIN: ICD-10-CM

## 2022-02-01 DIAGNOSIS — G89.29 CHRONIC NECK PAIN: ICD-10-CM

## 2022-02-01 DIAGNOSIS — R29.3 ABNORMAL POSTURE: ICD-10-CM

## 2022-02-01 DIAGNOSIS — M54.42 CHRONIC LOW BACK PAIN WITH BILATERAL SCIATICA, UNSPECIFIED BACK PAIN LATERALITY: Primary | ICD-10-CM

## 2022-02-01 DIAGNOSIS — M40.50 LORDOSIS: ICD-10-CM

## 2022-02-01 DIAGNOSIS — M54.41 CHRONIC LOW BACK PAIN WITH BILATERAL SCIATICA, UNSPECIFIED BACK PAIN LATERALITY: Primary | ICD-10-CM

## 2022-02-01 DIAGNOSIS — M41.9 SCOLIOSIS OF THORACOLUMBAR SPINE, UNSPECIFIED SCOLIOSIS TYPE: ICD-10-CM

## 2022-02-01 DIAGNOSIS — G89.29 CHRONIC LOW BACK PAIN WITH BILATERAL SCIATICA, UNSPECIFIED BACK PAIN LATERALITY: Primary | ICD-10-CM

## 2022-02-01 PROCEDURE — 97161 PT EVAL LOW COMPLEX 20 MIN: CPT | Performed by: PHYSICAL THERAPIST

## 2022-02-02 NOTE — THERAPY EVALUATION
Outpatient Physical Therapy Ortho Initial Evaluation  [facility]     Patient Name: Kristin Jones  : 1995  MRN: [id number]  Today's Date: 2022      Visit Date: 2022    Patient Active Problem List   Diagnosis   • Term pregnancy   • Breast lump   • Generalized anxiety disorder   • Lordosis   • Low back pain   • Migraine without aura, not refractory   • Obesity   • Smoker   • Dysmenorrhea        Past Medical History:   Diagnosis Date   • Chlamydia    • Depression    • Low blood sugar    • Spinal headache 20: spinal tap, : from epidural        Past Surgical History:   Procedure Laterality Date   • WISDOM TOOTH EXTRACTION  2015       Visit Dx:     ICD-10-CM ICD-9-CM   1. Chronic low back pain with bilateral sciatica, unspecified back pain laterality  M54.41 724.2    G89.29 724.3    M54.42 338.29   2. Lordosis  M40.50 737.20   3. Chronic neck pain  M54.2 723.1    G89.29 338.29   4. Abnormal posture  R29.3 781.92   5. Scoliosis of thoracolumbar spine, unspecified scoliosis type  M41.9 737.30   6. Macromastia  N62 611.1          Patient History     Row Name 22 1600             History    Chief Complaint Pain; Muscle weakness; Falls/history of falls  -SC      Type of Pain Back pain; Neck pain  -SC      Date Current Problem(s) Began --  chronic but increased after delivery of 1st child 2020  -SC      Brief Description of Current Complaint Patient reports chronic low back pain, multiple falls down stairs, states was diagnosed incorrectly with MS at age 17 due to symptoms. Saw 2 neurologists, states no currently diagnoses, Did have spinal tap for assessment and states had spinal leak so pain in back since then. Patient also reports that she has had large breasts since 4th grade (C cup), currently F (cup). States has thought about reduction. Has been fitted for bra during pregant but not since finishing nursing and breasts are larger and more pendulus.  Patient states increased back pain due to breast changes and with lifting/carrying for 1 year old. 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 On computer for all activities. Goals to improve symptoms. Interested in massage therapy, exercise program, has done dry needling and cupping in past as well. Did therpay in 2018 and states was improving but ran out of sessions due to insurance.  -SC      Previous treatment for THIS PROBLEM Rehabilitation  -SC      Patient/Caregiver Goals Relieve pain; Return to prior level of function; Improve mobility; Improve strength; Know what to do to help the symptoms  -SC      Current Tobacco Use yes  -SC      Patient's Rating of General Health Good  -SC      Hand Dominance left-handed  -SC      Occupation/sports/leisure activities care for 1 year old, full time student [employer]  -SC      Patient seeing anyone else for problem(s)? OB  -SC      How has patient tried to help current problem? gentle stretching  -SC      History of Previous Related Injuries chronic  -SC      Are you or can you be pregnant No  -SC              Pain     Pain Location Back; Sacrum; Neck; Other (Comment)  scapular left > right  -SC      Pain at Present 6  -SC      Pain at Best 2  -SC      Pain at Worst 4  -SC      Pain Frequency Constant/continuous  -SC      Pain Description Aching; Burning; Cramping; Discomfort; Dull; Nagging; Pressure; Radiating; Sharp; Shooting; Sore; Spasm; Tender; Tightness; Tiring  -SC      What Performance Factors Make the Current Problem(s) WORSE? care for son, lifting/pushing/pulling, housework, sleeping, sitting long periods (computer work)  -SC      What Performance Factors Make the Current Problem(s) BETTER? rest, pillows, showers  -SC      Pain Comments back/sacrum, scapulothoracic, neck  -SC      Is your sleep disturbed? Yes  -SC      Is medication used to assist with sleep? Unspecified  -SC      What position do you sleep in? Right sidelying; Left sidelying  -SC       Difficulties with ADL's? yes  -SC      Difficulties with recreational activities? yes  -SC              Fall Risk Assessment    Any falls in the past year: No  -SC      Previous Functional Level --  independent  -SC              Services    Are you currently receiving Home Health services No  -SC              Daily Activities    Primary Language English  -SC      Are you able to read Yes  -SC      Are you able to write Yes  -SC      How does patient learn best? Listening; Reading; Demonstration  -SC      Teaching needs identified Home Exercise Program; Management of Condition  -SC      Patient is concerned about/has problems with Flexibility; Grasping objects lifting; Performing home management (household chores, shopping, care of dependents); Performing job responsibilities/community activities (work, school,; Performing sports, recreation, and play activities; Reaching over head; Repetitive movements of the hand, arm, shoulder; Standing; Walking  -SC      Does patient have problems with the following? Other (comment)  unspecified  -SC      Barriers to learning None  -SC      Recommended Referrals Other (comment)  bra fitting/plastic surgeon  -SC      Pt Participated in POC and Goals Yes  -SC              Safety    Are you being hurt, hit, or frightened by anyone at home or in your life? No  -SC      Are you being neglected by a caregiver No  -SC            User Key  (r) = Recorded By, (t) = Taken By, (c) = Cosigned By    Initials Name Provider Type    SC Mary Herman PT Physical Therapist                 PT Ortho     Row Name 02/01/22 1600       Subjective Comments    Subjective Comments Initial Evaluation  -SC       Precautions and Contraindications    Precautions/Limitations no known precautions/limitations  -SC       Subjective Pain    Able to rate subjective pain? yes  -SC    Pre-Treatment Pain Level 4  -SC    Subjective Pain Comment low back and neck  -SC       Posture/Observations    Alignment  Options Forward head; Cervical lordosis; Thoracic kyphosis; Rounded shoulders; Scapular elevation; Scapular winging; Scoliosis; Lumbar lordosis; Genu valgus; Foot pronation  -SC    Forward Head Moderate; Increased; Standing posture  -SC    Cervical Lordosis Mild; Decreased; Standing posture  -SC    Thoracic Kyphosis Mild; Moderate; Increased; Standing posture  -SC    Rounded Shoulders Bilateral:; Moderate; Increased; Standing posture  -SC    Scapular Elevation Right:; Mild; Left:; Moderate; Increased; Standing posture  -SC    Scapular winging Bilateral:; Mild; Increased; Standing posture  -SC    Scoliosis Mild; Moderate; Increased; Standing posture  -SC    Lumbar lordosis Moderate; Severe; Increased; Standing posture  with marked posterior lean  -SC    Genu valgus Bilateral:; Mild; Moderate; Increased; Standing posture  with hyperextension  -SC    Foot pronation Bilateral:; Mild; Moderate; Increased; Standing posture  -SC    Observations Muscle atrophy  -SC    Posture/Observations Comments marked posterior lean  -SC       Quarter Clearing    Quarter Clearing Upper Quarter Clearing; Lower Quarter Clearing  -SC       Sensory Screen for Light Touch- Upper Quarter Clearing    C4 (posterior shoulder) Bilateral:; Intact  -SC    C5 (lateral upper arm) Bilateral:; Intact  -SC    C6 (tip of thumb) Bilateral:; Intact  -SC    C7 (tip of 3rd finger) Bilateral:; Intact  -SC    C8 (tip of 5th finger) Bilateral:; Intact  -SC    T1 (medial lower arm) Bilateral:; Intact  -SC       Myotomal Screen- Upper Quarter Clearing    Shoulder flexion (C5) Bilateral:; WNL  -SC    Elbow flexion/wrist extension (C6) Bilateral:; WNL  -SC    Elbow extension/wrist flexion (C7) Bilateral:; WNL  -SC       Cervical/Shoulder ROM Screen    Cervical flexion Normal  -SC    Cervical extension Normal  -SC    Cervical lateral flexion Normal  -SC    Cervical rotation Normal  -SC    Shoulder elevation  Normal  -SC       Neural Tension Signs- Lower Quarter  Clearing    Slump Bilateral:; Negative  -SC    SLR Bilateral:; Negative  -SC       Sensory Screen for Light Touch- Lower Quarter Clearing    L1 (inguinal area) Bilateral:; Intact  -SC    L2 (anterior mid thigh) Bilateral:; Intact  -SC    L3 (distal anterior thigh) Bilateral:; Intact  -SC    L4 (medial lower leg/foot) Bilateral:; Intact  -SC    L5 (lateral lower leg/great toe) Bilateral:; Intact  -SC       Myotomal Screen- Lower Quarter Clearing    Hip flexion (L2) Right:; 3+ (Fair +); Left:; 4 (Good)  -SC    Knee extension (L3) Bilateral:; 5 (Normal)  -SC    Ankle DF (L4) Bilateral:; 5 (Normal)  -SC    Knee flexion (S2) Bilateral:; 4+ (Good +)  -SC       Lumbar ROM Screen- Lower Quarter Clearing    Lumbar Flexion Normal  marked thoracolumbar scoliosis  -SC    Lumbar Extension Normal  hypermobility  -SC    Lumbar Lateral Flexion Normal  -SC    Lumbar Rotation Normal  -SC       Special Tests/Palpation    Special Tests/Palpation Lumbar/SI  -SC       Lumbosacral Accessory Motions    Lumbosacral Accessory Motions Tested? Yes  -SC    PA [place]- L3 Center:; Hypomobile  -SC    PA [place]- L4 Center:; Hypomobile  -SC    PA [place]- L5 Center:; Hypomobile  -SC    PA glide- Sacral base Center:; Hypermobile  -SC       Leg Length Test    Apparent Unequal; Right Higher Leg  -SC       General ROM    GENERAL ROM COMMENTS B UEs/LEs WFLs  -SC       MMT (Manual Muscle Testing)    Rt Lower Ext Rt Hip ABduction; Rt Hip Internal (Medial) Rotation; Rt Hip External (Lateral) Rotation  -SC    Lt Lower Ext Lt Hip ABduction; Lt Hip Internal (Medial) Rotation; Lt Hip External (Lateral) Rotation  -SC       MMT Right Lower Ext    Rt Hip ABduction MMT, Gross Movement (3+/5) fair plus  -SC    Rt Hip Internal (Medial) Rotation MMT, Gross Movement (4/5) good  -SC    Rt Hip External (Lateral) Rotation MMT, Gross Movement (4/5) good  -SC    Rt Lower Extremity Comments  SL  -SC       MMT Left Lower Ext    Lt Hip ABduction MMT, Gross Movement (4/5) good  -SC     Lt Hip Internal (Medial) Rotation MMT, Gross Movement (4/5) good  -SC    Lt Hip External (Lateral) Rotation MMT, Gross Movement (4/5) good  -SC    Lt Lower Extremity Comments  SL  -SC       Pathomechanics    Spine Pathomechanics Hinges into extension at one segment in lumbar  -SC          User Key  (r) = Recorded By, (t) = Taken By, (c) = Cosigned By    Initials Name Provider Type    SC Mary Herman, PT Physical Therapist                            Therapy Education  Education Details: spinal alignment, muscle imbalance, referral for endocrinologist vs rheumatology due to symptoms, referral to plastics for discussion of breast reduction, referral for bra fitting and how to submit to insurance, goals for therapy, prognosis, core stabilization program  Given: Symptoms/condition management, Other (comment)  Program: New  How Provided: Verbal, Demonstration  Provided to: Patient  Level of Understanding: Teach back education performed, Verbalized, Demonstrated      PT OP Goals     Row Name 02/01/22 1600          PT Short Term Goals    STG Date to Achieve 03/03/22  -SC     STG 1 Patient independent and compliant with initial HEP focused on core/pelvic stabilization and flexibility techniques for muscle rebalance and decreased pain levels  -SC     STG 1 Progress [facility]  -SC     STG 2 Patient demonstrate proper lift techniques for care of child with dressing, changing diapers, carrying, etc  -SC     STG 2 Progress [facility]     STG 3 Patient able to perform PPT with proper form to improve TA contraction through functional mobility  -SC     STG 3 Progress [facility]     STG 4 Patient with improved posture to upright (decreased posterior lean)  -SC     STG 4 Progress [facility]            Long Term Goals    LTG Date to Achieve 05/02/22  -SC     LTG 1 Patient independent and compliant with advanced HEP for transition to self care of condition  -SC     LTG 1 Progress [facility]     LTG 2 Patient demonstrate proper ergonomic set  "up for school related duties for decreased spinal pain  -SC     LTG 2 Progress [facility]  -SC     LTG 3 Patient MMT B hip abductors/ER/IR in SL >/=4+/5 for improved stabilization of core/pelvis  -SC     LTG 3 Progress New  -SC     LTG 4 Patient with improved alignment of pelvic/innominates for decreased symptoms sacral/lumbar region  -SC     LTG 4 Progress New  -SC            Time Calculation    PT Goal Re-Cert Due Date 05/02/22  -SC           User Key  (r) = Recorded By, (t) = Taken By, (c) = Cosigned By    Initials Name Provider Type    SC Mary Herman, PT Physical Therapist                 PT Assessment/Plan     Row Name 02/01/22 1600          PT Assessment    Functional Limitations Limitation in home management; Limitations in community activities; Performance in leisure activities; Performance in sport activities; Performance in work activities  -SC     Impairments Impaired flexibility; Impaired postural alignment; Joint integrity; Muscle strength; Pain; Poor body mechanics; Posture  -SC     Assessment Comments Ms. Jones is a 26 year old female, presents to therapy in stable condition, s/s consistent with referring diagnosis of chronic low back pain, symptoms since age 17 with fall down stairs, testing for MS through neurology with spinal tap and spinal fluid leak. Patient has had symptoms on/off since that time however reports marked increased after birth of child in Nov 2020. States had epidural that they \"went too deep\" and affected the nerves again. Did PT for LBP outside facility in 2018 with improved symptoms but \"ran out of visits\". 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 Patient has marked dysfunction of posture with hypermobility of elbows, knees, lumbar spine with forward rounded shoulders/thoracic kyphosis/mild to moderate thoracolumbar scoliosis, marked lumbar lordosis and posterior lean in standing position. Innominate dysfunction with left lower than " "right creating apparant LLD left \"longer\" than right. Marked core/pelvic weakness with atrophy visible of gluteals. Poor body mechanics that contributes to symptoms as well. May benefit from referral for additional medical testing due to symptoms including endocrinology vs rheumatology. However, is a good candadite for formal PT due to symptoms, generalized weakness, muscle imbalance, and good motivation for care.  -SC     Rehab Potential Good  -SC     Patient/caregiver participated in establishment of treatment plan and goals Yes  -SC     Patient would benefit from skilled therapy intervention Yes  -SC            PT Plan    Predicted Duration of Therapy Intervention (PT) 20 sessions  -SC     Planned CPT's? PT EVAL LOW COMPLEXITY: 95291; PT RE-EVAL: 95444; PT THER PROC EA 15 MIN: 90881; PT THER ACT EA 15 MIN: 95317; PT MANUAL THERAPY EA 15 MIN: 18564; PT NEUROMUSC RE-EDUCATION EA 15 MIN: 12920; PT GAIT TRAINING EA 15 MIN: 56317; PT EVAL AQUA: 97119; PT AQUATIC THERAPY EA 15 MIN: 61813; PT SELF CARE/HOME MGMT/TRAIN EA 15: 81853; PT HOT OR COLD PACK TREAT [facility]; PT ELECTRICAL STIM UNATTEND: ; PT ELECTRICAL STIM ATTD EA 15 MIN: 15260; PT ULTRASOUND EA 15 MIN: 95893; PT TRACTION CERVICAL: 39448; PT TRACTION LUMBAR: 94807  -SC     PT Plan Comments focus on core stabilization in HL with gentle flexibility program. discussion of fitted bra to assist with symptoms. lifting mechanics for child and ergonomic educations, manual techniques for pelvis if indicated  -SC           User Key  (r) = Recorded By, (t) = Taken By, (c) = Cosigned By    Initials Name Provider Type    SC Mary Herman, PT Physical Therapist                   OP Exercises     Row Name 02/01/22 1600             Subjective Comments    Subjective Comments Initial Evaluation  -SC              Subjective Pain    Able to rate subjective pain? yes  -SC      Pre-Treatment Pain Level 4  -SC      Subjective Pain Comment low back and neck  -SC            " User Key  (r) = Recorded By, (t) = Taken By, (c) = Cosigned By    Initials Name Provider Type    SC Mary Herman, PT Physical Therapist                              Outcome Measure Options: Modifed Owestry  Modified Oswestry  Modified Oswestry Score/Comments: 64%      Time Calculation:     Start Time: 1536  Stop Time: 1608  Time Calculation (min): 32 min  Untimed Charges  PT Eval/Re-eval Minutes: 32  Total Minutes  Untimed Charges Total Minutes: 32   Total Minutes: 32     Therapy Charges for Today     Code Description Service Date Service Provider Modifiers Qty    39637849928 HC PT EVAL LOW COMPLEXITY 2 2/1/2022 Mary Herman, PT GP 1          PT G-Codes  Outcome Measure Options: Modifed [place]ry  Modified Oswestry Score/Comments: 64%         Mary Ruano, PT  2/2/2022

## 2022-03-01 ENCOUNTER — HOSPITAL ENCOUNTER (OUTPATIENT)
Dept: PHYSICAL THERAPY | Facility: HOSPITAL | Age: 27
Setting detail: THERAPIES SERIES
Discharge: HOME | End: 2022-03-01

## 2022-03-01 DIAGNOSIS — M41.9 SCOLIOSIS OF THORACOLUMBAR SPINE, UNSPECIFIED SCOLIOSIS TYPE: ICD-10-CM

## 2022-03-01 DIAGNOSIS — M54.42 CHRONIC LOW BACK PAIN WITH BILATERAL SCIATICA, UNSPECIFIED BACK PAIN LATERALITY: Primary | ICD-10-CM

## 2022-03-01 DIAGNOSIS — R29.3 ABNORMAL POSTURE: ICD-10-CM

## 2022-03-01 DIAGNOSIS — N62 MACROMASTIA: ICD-10-CM

## 2022-03-01 DIAGNOSIS — M40.50 LORDOSIS: ICD-10-CM

## 2022-03-01 DIAGNOSIS — M54.41 CHRONIC LOW BACK PAIN WITH BILATERAL SCIATICA, UNSPECIFIED BACK PAIN LATERALITY: Primary | ICD-10-CM

## 2022-03-01 DIAGNOSIS — G89.29 CHRONIC LOW BACK PAIN WITH BILATERAL SCIATICA, UNSPECIFIED BACK PAIN LATERALITY: Primary | ICD-10-CM

## 2022-03-01 DIAGNOSIS — M54.2 CHRONIC NECK PAIN: ICD-10-CM

## 2022-03-01 DIAGNOSIS — G89.29 CHRONIC NECK PAIN: ICD-10-CM

## 2022-03-01 PROCEDURE — 97110 THERAPEUTIC EXERCISES: CPT

## 2022-03-01 NOTE — THERAPY TREATMENT NOTE
Outpatient Physical Therapy Ortho Treatment Note  [facility]     Patient Name: Kristin Jones  : 1995  MRN: [id number]  Today's Date: 3/1/2022      Visit Date: 2022    Visit Dx:    ICD-10-CM ICD-9-CM   1. Chronic low back pain with bilateral sciatica, unspecified back pain laterality  M54.41 724.2    G89.29 724.3    M54.42 338.29   2. Lordosis  M40.50 737.20   3. Chronic neck pain  M54.2 723.1    G89.29 338.29   4. Abnormal posture  R29.3 781.92   5. Scoliosis of thoracolumbar spine, unspecified scoliosis type  M41.9 737.30   6. Macromastia  N62 611.1       Patient Active Problem List   Diagnosis   • Term pregnancy   • Breast lump   • Generalized anxiety disorder   • Lordosis   • Low back pain   • Migraine without aura, not refractory   • Obesity   • Smoker   • Dysmenorrhea        Past Medical History:   Diagnosis Date   • Chlamydia    • Depression    • Low blood sugar    • Spinal headache 20: spinal tap, : from epidural        Past Surgical History:   Procedure Laterality Date   • WISDOM TOOTH EXTRACTION  2015                        PT Assessment/Plan     Row Name 22 1700          PT Assessment    Assessment Comments Kristin returns for first follow up from initial evaluation roughly 4 weeks ago, reports increased stiffness over recent weeks had gap in beginning therapy due to clinic and personal schedule with school/classes. Initiated core stabilization program with cueing throughout to maintain proper TrA contraction vs. holding breath with pt. able ot correct with gentle tactile cues at pelvis particularly with quadruped position. Began discussion on improving work/desk set up to reduce thoracic kyphosis and improve posture. Pt. will continue to benefit from skilled PT.  -            PT Plan    PT Plan Comments isssue new schedule  -           User Key  (r) = Recorded By, (t) = Taken By, (c) = Cosigned By    Initials Name Provider Type      Anisa Camejo, PT Physical Therapist                   OP Exercises     Row Name 03/01/22 1700             Subjective Comments    Subjective Comments I'm just more stiff lately  -MH              Total Minutes    87231 - PT Therapeutic Exercise Minutes 40  -MH              Exercise 1    Exercise Name 1 NuStep  -MH      Cueing 1 Verbal; Demo  -MH      Time 1 5 min  -MH              Exercise 2    Exercise Name 2 piriformis  -MH      Cueing 2 Verbal; Demo; Tactile  -MH      Reps 2 2e  -MH      Time 2 20sec  -MH              Exercise 3    Exercise Name 3 PPT  -MH      Cueing 3 Verbal; Demo  -MH      Reps 3 10  -MH      Time 3 5sec  -MH              Exercise 4    Exercise Name 4 PPT + bridge  -MH      Cueing 4 Verbal; Demo  -MH      Reps 4 10  -MH      Time 4 2-3sec  -MH              Exercise 5    Exercise Name 5 deadbug isometric  -MH      Cueing 5 Verbal; Demo  -MH      Reps 5 8  -MH      Time 5 red swiss ball  -MH              Exercise 6    Exercise Name 6 qped TrA  -MH      Cueing 6 Verbal; Demo; Tactile  -MH      Reps 6 10  -MH      Time 6 5sec  -MH              Exercise 7    Exercise Name 7 red pose  -MH      Cueing 7 Verbal; Demo  -MH      Reps 7 2  -MH      Time 7 20sec  -MH              Exercise 8    Exercise Name 8 modified bird dog  -MH      Cueing 8 Verbal; Demo; Tactile  -MH      Reps 8 10  -MH      Time 8 LE sliding  -MH              Exercise 9    Exercise Name 9 modified plank - hands on mat  -MH      Cueing 9 Verbal; Demo  -MH      Reps 9 3  -MH      Time 9 30sec  -MH      Additional Comments fists vs. palms flat 2' wrist pain  -MH              Exercise 10    Exercise Name 10 alt shoulder ext  -MH      Cueing 10 Verbal; Demo  -MH      Reps 10 10e  -MH      Time 10 GTB  -MH              Exercise 11    Exercise Name 11 doorway stretch  -MH      Cueing 11 Verbal; Demo  -MH      Reps 11 3  -MH      Time 11 20sec  -MH            User Key  (r) = Recorded By, (t) = Taken By, (c) = Cosigned By    Initials  Name Provider Type     Anisa Camejo, VELMA Physical Therapist                              PT OP Goals     Row Name 03/01/22 1700          PT Short Term Goals    STG Date to Achieve 03/03/22  -     STG 1 Patient independent and compliant with initial HEP focused on core/pelvic stabilization and flexibility techniques for muscle rebalance and decreased pain levels  -     STG 1 Progress Ongoing  [facility]     STG 2 Patient demonstrate proper lift techniques for care of child with dressing, changing diapers, carrying, etc  -     STG 2 Progress Ongoing  [facility]     STG 3 Patient able to perform PPT with proper form to improve TA contraction through functional mobility  -     STG 3 Progress [facility]     STG 4 Patient with improved posture to upright (decreased posterior lean)  -     STG 4 Progress [facility]            Long Term Goals    LTG Date to Achieve 05/02/22  -     LTG 1 Patient independent and compliant with advanced HEP for transition to self care of condition  -     LTG 1 Progress [facility]     LTG 2 Patient demonstrate proper ergonomic set up for school related duties for decreased spinal pain  -     LTG 2 Progress [facility]     LTG 3 Patient MMT B hip abductors/ER/IR in SL >/=4+/5 for improved stabilization of core/pelvis  -     LTG 3 Progress [facility]     LTG 4 Patient with improved alignment of pelvic/innominates for decreased symptoms sacral/lumbar region  -     LTG 4 Progress [facility]           User Key  (r) = Recorded By, (t) = Taken By, (c) = Cosigned By    Initials Name Provider Type     Anisa Camejo, VELMA Physical Therapist                Therapy Education  Education Details: work/desk set up, towel roll along tspine for upright posture/reduce shoulder rounding  Given: HEP, Symptoms/condition management, Posture/body mechanics  Program: Reinforced  How Provided: Verbal, Demonstration  Provided to: Patient  Level of Understanding: Demonstrated, Verbalized               Time Calculation:   Start Time: 1700  Stop Time: 1740  Time Calculation (min): 40 min  Total Timed Code Minutes- PT: 40 minute(s)  Timed Charges  47408 - PT Therapeutic Exercise Minutes: 40  Total Minutes  Timed Charges Total Minutes: 40   Total Minutes: 40  Therapy Charges for Today     Code Description Service Date Service Provider Modifiers Qty    99563229273 HC PT THER PROC EA 15 MIN 3/1/2022 Anisa Camejo, PT GP 3                    Anisa Camejo PT  3/1/2022

## 2022-03-03 ENCOUNTER — HOSPITAL ENCOUNTER (OUTPATIENT)
Dept: PHYSICAL THERAPY | Facility: HOSPITAL | Age: 27
Setting detail: THERAPIES SERIES
Discharge: HOME | End: 2022-03-03

## 2022-03-03 DIAGNOSIS — M54.42 CHRONIC LOW BACK PAIN WITH BILATERAL SCIATICA, UNSPECIFIED BACK PAIN LATERALITY: Primary | ICD-10-CM

## 2022-03-03 DIAGNOSIS — M41.9 SCOLIOSIS OF THORACOLUMBAR SPINE, UNSPECIFIED SCOLIOSIS TYPE: ICD-10-CM

## 2022-03-03 DIAGNOSIS — M40.50 LORDOSIS: ICD-10-CM

## 2022-03-03 DIAGNOSIS — M54.41 CHRONIC LOW BACK PAIN WITH BILATERAL SCIATICA, UNSPECIFIED BACK PAIN LATERALITY: Primary | ICD-10-CM

## 2022-03-03 DIAGNOSIS — G89.29 CHRONIC NECK PAIN: ICD-10-CM

## 2022-03-03 DIAGNOSIS — N62 MACROMASTIA: ICD-10-CM

## 2022-03-03 DIAGNOSIS — M54.2 CHRONIC NECK PAIN: ICD-10-CM

## 2022-03-03 DIAGNOSIS — G89.29 CHRONIC LOW BACK PAIN WITH BILATERAL SCIATICA, UNSPECIFIED BACK PAIN LATERALITY: Primary | ICD-10-CM

## 2022-03-03 DIAGNOSIS — R29.3 ABNORMAL POSTURE: ICD-10-CM

## 2022-03-03 PROCEDURE — 97110 THERAPEUTIC EXERCISES: CPT

## 2022-03-03 NOTE — THERAPY PROGRESS REPORT/RE-CERT
Outpatient Physical Therapy Ortho Progress Note  [facility]     Patient Name: Kristin Jones  : 1995  MRN: [id number]  Today's Date: 3/3/2022      Visit Date: 2022    Visit Dx:    ICD-10-CM ICD-9-CM   1. Chronic low back pain with bilateral sciatica, unspecified back pain laterality  M54.41 724.2    G89.29 724.3    M54.42 338.29   2. Lordosis  M40.50 737.20   3. Chronic neck pain  M54.2 723.1    G89.29 338.29   4. Abnormal posture  R29.3 781.92   5. Scoliosis of thoracolumbar spine, unspecified scoliosis type  M41.9 737.30   6. Macromastia  N62 611.1       Patient Active Problem List   Diagnosis   • Term pregnancy   • Breast lump   • Generalized anxiety disorder   • Lordosis   • Low back pain   • Migraine without aura, not refractory   • Obesity   • Smoker   • Dysmenorrhea        Past Medical History:   Diagnosis Date   • Chlamydia    • Depression    • Low blood sugar    • Spinal headache 20: spinal tap, : from epidural        Past Surgical History:   Procedure Laterality Date   • WISDOM TOOTH EXTRACTION  2015                        PT Assessment/Plan     Row Name 22 1657          PT Assessment    Functional Limitations Limitation in home management; Limitations in community activities; Performance in leisure activities; Performance in sport activities; Performance in work activities  -MH     Impairments Impaired flexibility; Impaired postural alignment; Joint integrity; Muscle strength; Pain; Poor body mechanics; Posture  -MH     Assessment Comments Kristin Jones has been seen for 3 physical therapy sessions for chronic LBP. Treatment has included therapeutic exercise and patient education with home exercise program . Progress to physical therapy goals is slow as pt. Has met 1/4 STGs, and 0/4 LTGs though progress likely limited due to minimal visits due to initial clinic/patient scheduling although pt. Now has consistent scheduled  appointments. Thus far no significant change in current condition, however, pt. Demonstrates improving core control/engagement with initial program and verbalizes improved awareness of posture and muscle recruitment through core/lumbar spine. With increased frequency/consistent appointments expect continued improvement in symptoms/condition. She will benefit from continued skilled physical therapy to address remaining impairments and functional limitations.  -     Please refer to paper survey for additional self-reported information Yes  -MH     Rehab Potential Good  -     Patient/caregiver participated in establishment of treatment plan and goals Yes  -MH     Patient would benefit from skilled therapy intervention Yes  -MH            PT Plan    PT Frequency 1x/week; 2x/week  -     Predicted Duration of Therapy Intervention (PT) 8 sessions  -     Planned CPT's? PT RE-EVAL: 41059; PT THER PROC EA 15 MIN: 70147; PT THER ACT EA 15 MIN: 74319; PT MANUAL THERAPY EA 15 MIN: 15824; PT NEUROMUSC RE-EDUCATION EA 15 MIN: 07883; PT GAIT TRAINING EA 15 MIN: 69532; PT SELF CARE/HOME MGMT/TRAIN EA 15: 20005; PT HOT OR COLD PACK TREAT [facility]; PT ELECTRICAL STIM UNATTEND: ; PT ELECTRICAL STIM ATTD EA 15 MIN: 04950; PT ULTRASOUND EA 15 MIN: 57286; PT TRACTION LUMBAR: 66860  -     PT Plan Comments consider A-R?  -           User Key  (r) = Recorded By, (t) = Taken By, (c) = Cosigned By    Initials Name Provider Type     Anisa Camejo, PT Physical Therapist                   OP Exercises     Row Name 03/03/22 1600             Subjective Comments    Subjective Comments It feels about the same but I can definitely feels where those muscles are engaging more.  -              Total Minutes    63304 - PT Therapeutic Exercise Minutes 38  -MH              Exercise 1    Exercise Name 1 NuStep  -      Cueing 1 Verbal; Demo  -      Time 1 5 min  -              Exercise 2    Exercise Name 2 piriformis  -MH      Cueing  2 Verbal; Demo; Tactile  -MH      Reps 2 2e  -MH      Time 2 20sec  -MH              Exercise 4    Exercise Name 4 PPT + bridge  -MH      Cueing 4 Verbal; Demo  -MH      Sets 4 2  -MH      Reps 4 10  -MH      Time 4 2-3sec  -MH      Additional Comments + hip add  -MH              Exercise 5    Exercise Name 5 deadbug isometric  -MH      Cueing 5 Verbal; Demo  -MH      Reps 5 10  -MH      Time 5 red swiss ball  -MH              Exercise 6    Exercise Name 6 qped TrA  -MH      Cueing 6 Verbal; Demo; Tactile  -MH      Reps 6 10  -MH      Time 6 5sec  -MH              Exercise 7    Exercise Name 7 red pose  -MH      Cueing 7 Verbal; Demo  -MH      Reps 7 3  -MH      Time 7 20sec  -MH              Exercise 8    Exercise Name 8 modified bird dog  -MH      Cueing 8 Verbal; Demo; Tactile  -MH      Reps 8 10  -MH      Time 8 LE sliding  -MH              Exercise 10    Exercise Name 10 alt shoulder ext  -MH      Cueing 10 Verbal; Demo  -MH      Reps 10 10e  -MH      Time 10 GTB  -MH              Exercise 11    Exercise Name 11 doorway stretch  -MH      Cueing 11 Verbal; Demo  -MH      Reps 11 3  -MH      Time 11 20sec  -MH              Exercise 12    Exercise Name 12 S/L clamshell  -MH      Cueing 12 Verbal; Tactile  -MH      Reps 12 15ea  -MH      Time 12 RTB  -MH              Exercise 13    Exercise Name 13 S/L reverse clam  -MH      Cueing 13 Verbal; Tactile  -MH      Reps 13 15ea  -MH            User Key  (r) = Recorded By, (t) = Taken By, (c) = Cosigned By    Initials Name Provider Type    Anisa Han, PT Physical Therapist                              PT OP Goals     Row Name 03/03/22 1600          PT Short Term Goals    STG Date to Achieve 03/03/22  -     STG 1 Patient independent and compliant with initial HEP focused on core/pelvic stabilization and flexibility techniques for muscle rebalance and decreased pain levels  -MH     STG 1 Progress Ongoing  -     STG 1 Progress Comments issued initial HEP this  date  -     STG 2 Patient demonstrate proper lift techniques for care of child with dressing, changing diapers, carrying, etc  -     STG 2 Progress Ongoing  -     STG 3 Patient able to perform PPT with proper form to improve TA contraction through functional mobility  -     STG 3 Progress Ongoing  [facility]     STG 4 Patient with improved posture to upright (decreased posterior lean)  -     STG 4 Progress Met  -     STG 4 Progress Comments pt. verbalizes improved awareness and correction with standing posture  -            Long Term Goals    LTG Date to Achieve 05/02/22  -     LTG 1 Patient independent and compliant with advanced HEP for transition to self care of condition  -     LTG 1 Progress Ongoing  -     LTG 2 Patient demonstrate proper ergonomic set up for school related duties for decreased spinal pain  -     LTG 2 Progress Ongoing  [facility]     LTG 3 Patient MMT B hip abductors/ER/IR in SL >/=4+/5 for improved stabilization of core/pelvis  -     LTG 3 Progress Ongoing  [facility]     LTG 4 Patient with improved alignment of pelvic/innominates for decreased symptoms sacral/lumbar region  -     LTG 4 Progress Ongoing  [facility]           User Key  (r) = Recorded By, (t) = Taken By, (c) = Cosigned By    Initials Name Provider Type    Anisa Han PT Physical Therapist                Therapy Education  Education Details: Issued HEP Access Code: [email or web address]  Given: HEP, Symptoms/condition management, Posture/body mechanics  Program: Reinforced  How Provided: Verbal, Demonstration, Written  Provided to: Patient  Level of Understanding: Verbalized, Demonstrated              Time Calculation:   Start Time: 1615  Stop Time: 1654  Time Calculation (min): 39 min  Total Timed Code Minutes- PT: 38 minute(s)  Timed Charges  27676 - PT Therapeutic Exercise Minutes: 38  Total Minutes  Timed Charges Total Minutes: 38   Total Minutes: 38  Therapy Charges for Today     Code Description Service Date Service Provider  Modifiers Qty    [id number]  PT THER PROC EA 15 MIN 3/3/2022 Anisa Camejo, PT GP 3                    Anisa Camejo PT  3/3/2022

## 2022-03-23 ENCOUNTER — HOSPITAL ENCOUNTER (OUTPATIENT)
Dept: PHYSICAL THERAPY | Facility: HOSPITAL | Age: 27
Setting detail: THERAPIES SERIES
Discharge: HOME | End: 2022-03-23

## 2022-03-23 DIAGNOSIS — M54.42 CHRONIC LOW BACK PAIN WITH BILATERAL SCIATICA, UNSPECIFIED BACK PAIN LATERALITY: Primary | ICD-10-CM

## 2022-03-23 DIAGNOSIS — G89.29 CHRONIC LOW BACK PAIN WITH BILATERAL SCIATICA, UNSPECIFIED BACK PAIN LATERALITY: Primary | ICD-10-CM

## 2022-03-23 DIAGNOSIS — M54.2 CHRONIC NECK PAIN: ICD-10-CM

## 2022-03-23 DIAGNOSIS — M54.41 CHRONIC LOW BACK PAIN WITH BILATERAL SCIATICA, UNSPECIFIED BACK PAIN LATERALITY: Primary | ICD-10-CM

## 2022-03-23 DIAGNOSIS — M41.9 SCOLIOSIS OF THORACOLUMBAR SPINE, UNSPECIFIED SCOLIOSIS TYPE: ICD-10-CM

## 2022-03-23 DIAGNOSIS — N62 MACROMASTIA: ICD-10-CM

## 2022-03-23 DIAGNOSIS — R29.3 ABNORMAL POSTURE: ICD-10-CM

## 2022-03-23 DIAGNOSIS — G89.29 CHRONIC NECK PAIN: ICD-10-CM

## 2022-03-23 DIAGNOSIS — M40.50 LORDOSIS: ICD-10-CM

## 2022-03-23 PROCEDURE — 97110 THERAPEUTIC EXERCISES: CPT

## 2022-03-23 NOTE — THERAPY TREATMENT NOTE
Outpatient Physical Therapy Ortho Treatment Note  [facility]     Patient Name: Kristin Jones  : 1995  MRN: [id number]  Today's Date: 3/23/2022      Visit Date: 2022    Visit Dx:    ICD-10-CM ICD-9-CM   1. Chronic low back pain with bilateral sciatica, unspecified back pain laterality  M54.41 724.2    G89.29 724.3    M54.42 338.29   2. Lordosis  M40.50 737.20   3. Chronic neck pain  M54.2 723.1    G89.29 338.29   4. Abnormal posture  R29.3 781.92   5. Scoliosis of thoracolumbar spine, unspecified scoliosis type  M41.9 737.30   6. Macromastia  N62 611.1       Patient Active Problem List   Diagnosis   • Term pregnancy   • Breast lump   • Generalized anxiety disorder   • Lordosis   • Low back pain   • Migraine without aura, not refractory   • Obesity   • Smoker   • Dysmenorrhea        Past Medical History:   Diagnosis Date   • Chlamydia    • Depression    • Low blood sugar    • Spinal headache 20: spinal tap, : from epidural        Past Surgical History:   Procedure Laterality Date   • WISDOM TOOTH EXTRACTION  2015                        PT Assessment/Plan     Row Name 22 1700          PT Assessment    Assessment Comments Ms. Jones returns to PT with reports of increased lumbar paraspinal muscular spasms/tension over last several days. She demonstrates continuous improvement in TA activation in all positions. Upon standing, pt continues to present with sway back posture and benefits from cues to perform PPT leading to more neutral position. She also tolerated addition of LTR, lateral stepping, and AR press without increased pain. She would benefit from continued skilled PT.  -OH            PT Plan    PT Plan Comments D/c qped TA activation, consider SL t-spine rotation, monster walk?  -OH           User Key  (r) = Recorded By, (t) = Taken By, (c) = Cosigned By    Initials Name Provider Type    Giovanna Amaya, PT Physical Therapist                    OP Exercises     Row Name 03/23/22 1600             Subjective Comments    Subjective Comments I am feeling really stiff today and Brenna had some muscle spasms.  -OH              Subjective Pain    Able to rate subjective pain? yes  -OH      Pre-Treatment Pain Level 7  -OH              Total Minutes    48944 - PT Therapeutic Exercise Minutes 40  -OH              Exercise 1    Exercise Name 1 NuStep  -OH      Cueing 1 Verbal;Demo  -OH      Time 1 5 min  -OH              Exercise 2    Exercise Name 2 piriformis  -OH      Cueing 2 Verbal;Demo;Tactile  -OH      Reps 2 2e  -OH      Time 2 20sec  -OH              Exercise 3    Exercise Name 3 LTR  -OH      Cueing 3 Verbal;Demo  -OH      Sets 3 1  -OH      Reps 3 10  -OH      Time 3 5s  -OH              Exercise 4    Exercise Name 4 PPT + bridge  -OH      Cueing 4 Verbal;Demo  -OH      Sets 4 2  -OH      Reps 4 10  -OH      Time 4 2-3sec  -OH      Additional Comments + hip add  -OH              Exercise 5    Exercise Name 5 deadbug isometric  -OH      Cueing 5 Verbal;Demo  -OH      Reps 5 10  -OH      Time 5 red swiss ball  -OH              Exercise 6    Exercise Name 6 qped TrA  -OH      Cueing 6 Verbal;Demo;Tactile  -OH      Reps 6 10  -OH      Time 6 5sec  -OH              Exercise 7    Exercise Name 7 red pose  -OH      Cueing 7 Verbal;Demo  -OH      Reps 7 3  -OH      Time 7 20sec  -OH              Exercise 8    Exercise Name 8 modified bird dog  -OH      Cueing 8 Verbal;Demo;Tactile  -OH      Sets 8 2  -OH      Reps 8 10  -OH      Time 8 LE sliding  -OH              Exercise 10    Exercise Name 10 alt shoulder ext  -OH      Cueing 10 Verbal;Demo  -OH      Sets 10 2  -OH      Reps 10 10e  -OH      Time 10 GTB  -OH              Exercise 11    Exercise Name 11 doorway stretch  -OH      Cueing 11 Verbal;Demo  -OH      Reps 11 3  -OH      Time 11 20sec  -OH              Exercise 12    Exercise Name 12 S/L clamshell  -OH      Cueing 12 Verbal;Tactile  -OH       Reps 12 15ea  -OH      Time 12 RTB  -OH              Exercise 13    Exercise Name 13 S/L reverse clam  -OH      Cueing 13 Verbal;Tactile  -OH      Reps 13 15ea  -OH              Exercise 14    Exercise Name 14 lateral stepping  -OH      Cueing 14 Verbal;Demo  -OH      Reps 14 3 laps  -OH              Exercise 15    Exercise Name 15 AR press  -OH      Cueing 15 Verbal;Demo  -OH      Sets 15 2  -OH      Reps 15 10  -OH      Additional Comments RTB  -OH              Exercise 16    Exercise Name 16 standing PPT  -OH      Cueing 16 Verbal;Tactile;Demo  -OH      Sets 16 1  -HO      Reps 16 10  -OH      Time 16 5s  -OH      Additional Comments reduce sway back  -OH            User Key  (r) = Recorded By, (t) = Taken By, (c) = Cosigned By    Initials Name Provider Type    Giovanna Amaya, PT Physical Therapist                              PT OP Goals     Row Name 03/23/22 1600          PT Short Term Goals    STG Date to Achieve 03/03/22  -OH     STG 1 Patient independent and compliant with initial HEP focused on core/pelvic stabilization and flexibility techniques for muscle rebalance and decreased pain levels  -OH     STG 1 Progress Ongoing  -OH     STG 2 Patient demonstrate proper lift techniques for care of child with dressing, changing diapers, carrying, etc  -     STG 2 Progress Ongoing  -OH     STG 3 Patient able to perform PPT with proper form to improve TA contraction through functional mobility  -     STG 3 Progress Ongoing  -OH     STG 4 Patient with improved posture to upright (decreased posterior lean)  -     STG 4 Progress Met  -OH            Long Term Goals    LTG Date to Achieve 05/02/22  -OH     LTG 1 Patient independent and compliant with advanced HEP for transition to self care of condition  -OH     LTG 1 Progress Ongoing  -OH     LTG 2 Patient demonstrate proper ergonomic set up for school related duties for decreased spinal pain  -OH     LTG 2 Progress Ongoing  -OH     LTG 3 Patient MMT  B hip abductors/ER/IR in SL >/=4+/5 for improved stabilization of core/pelvis  -OH     LTG 3 Progress Ongoing  -OH     LTG 4 Patient with improved alignment of pelvic/innominates for decreased symptoms sacral/lumbar region  -OH     LTG 4 Progress Ongoing  -OH           User Key  (r) = Recorded By, (t) = Taken By, (c) = Cosigned By    Initials Name Provider Type    Giovanna Amaya, PT Physical Therapist                               Time Calculation:   Start Time: 1617  Stop Time: 1657  Time Calculation (min): 40 min  Timed Charges  59466 - PT Therapeutic Exercise Minutes: 40  Total Minutes  Timed Charges Total Minutes: 40   Total Minutes: 40  Therapy Charges for Today     Code Description Service Date Service Provider Modifiers Qty    [id number]  PT THER PROC EA 15 MIN 3/23/2022 Giovanna Means, PT GP 3                    Giovanna Means, PT  3/23/2022

## 2022-03-25 ENCOUNTER — TELEPHONE (OUTPATIENT)
Dept: PHYSICAL THERAPY | Facility: HOSPITAL | Age: 27
End: 2022-03-25

## 2022-03-30 ENCOUNTER — HOSPITAL ENCOUNTER (OUTPATIENT)
Dept: PHYSICAL THERAPY | Facility: HOSPITAL | Age: 27
Setting detail: THERAPIES SERIES
Discharge: HOME | End: 2022-03-30

## 2022-03-30 DIAGNOSIS — R29.3 ABNORMAL POSTURE: ICD-10-CM

## 2022-03-30 DIAGNOSIS — G89.29 CHRONIC LOW BACK PAIN WITH BILATERAL SCIATICA, UNSPECIFIED BACK PAIN LATERALITY: Primary | ICD-10-CM

## 2022-03-30 DIAGNOSIS — M54.2 CHRONIC NECK PAIN: ICD-10-CM

## 2022-03-30 DIAGNOSIS — M54.41 CHRONIC LOW BACK PAIN WITH BILATERAL SCIATICA, UNSPECIFIED BACK PAIN LATERALITY: Primary | ICD-10-CM

## 2022-03-30 DIAGNOSIS — M54.42 CHRONIC LOW BACK PAIN WITH BILATERAL SCIATICA, UNSPECIFIED BACK PAIN LATERALITY: Primary | ICD-10-CM

## 2022-03-30 DIAGNOSIS — M41.9 SCOLIOSIS OF THORACOLUMBAR SPINE, UNSPECIFIED SCOLIOSIS TYPE: ICD-10-CM

## 2022-03-30 DIAGNOSIS — M40.50 LORDOSIS: ICD-10-CM

## 2022-03-30 DIAGNOSIS — G89.29 CHRONIC NECK PAIN: ICD-10-CM

## 2022-03-30 DIAGNOSIS — N62 MACROMASTIA: ICD-10-CM

## 2022-03-30 PROCEDURE — 97110 THERAPEUTIC EXERCISES: CPT

## 2022-03-30 NOTE — THERAPY TREATMENT NOTE
Outpatient Physical Therapy Ortho Treatment Note  [facility]     Patient Name: Kristin Jones  : 1995  MRN: [id number]  Today's Date: 3/30/2022      Visit Date: 2022    Visit Dx:    ICD-10-CM ICD-9-CM   1. Chronic low back pain with bilateral sciatica, unspecified back pain laterality  M54.41 724.2    G89.29 724.3    M54.42 338.29   2. Lordosis  M40.50 737.20   3. Chronic neck pain  M54.2 723.1    G89.29 338.29   4. Abnormal posture  R29.3 781.92   5. Scoliosis of thoracolumbar spine, unspecified scoliosis type  M41.9 737.30   6. Macromastia  N62 611.1       Patient Active Problem List   Diagnosis   • Term pregnancy   • Breast lump   • Generalized anxiety disorder   • Lordosis   • Low back pain   • Migraine without aura, not refractory   • Obesity   • Smoker   • Dysmenorrhea        Past Medical History:   Diagnosis Date   • Chlamydia    • Depression    • Low blood sugar    • Spinal headache 20: spinal tap, : from epidural        Past Surgical History:   Procedure Laterality Date   • WISDOM TOOTH EXTRACTION  2015                        PT Assessment/Plan     Row Name 22 1700          PT Assessment    Assessment Comments Ms. Jones returns to PT with reports of consistent improvement in LBP and postural awareness leading to mild reduction in postural sway upon standing. Patient tolerated addition of SL thoracic rotation with cervical following and monster walks without increased pain. Updated HEP, reviewed changes with pt, provided TB and handout and pt verbalized understanding. She would benefit from continued skilled PT to address lingering back pain with functional related tasks in attempt to return to PLOF.  -OH            PT Plan    PT Plan Comments PN-VINCENZO,  row on SLS, lifting mechanics  -OH           User Key  (r) = Recorded By, (t) = Taken By, (c) = Cosigned By    Initials Name Provider Type    Giovanna Amaya, PT Physical  Therapist                   OP Exercises     Row Name 03/30/22 1600             Subjective Comments    Subjective Comments I feel like I am getting better. I am more aware of my positioning when I am standing and able to perform PPT leading to dec pain  -OH              Subjective Pain    Able to rate subjective pain? yes  -OH      Pre-Treatment Pain Level 5  -OH              Total Minutes    85673 - PT Therapeutic Exercise Minutes 43  -OH              Exercise 1    Exercise Name 1 NuStep  -OH      Cueing 1 Verbal;Demo  -OH      Time 1 5 min  -OH              Exercise 2    Exercise Name 2 piriformis  -OH      Cueing 2 Verbal;Demo;Tactile  -OH      Reps 2 2e  -OH      Time 2 20sec  -OH              Exercise 3    Exercise Name 3 LTR  -OH      Cueing 3 Verbal;Demo  -OH      Sets 3 1  -OH      Reps 3 10  -OH      Time 3 5s  -OH              Exercise 4    Exercise Name 4 PPT + bridge  -OH      Cueing 4 Verbal;Demo  -OH      Sets 4 2  -OH      Reps 4 10  -OH      Time 4 2-3sec  -OH      Additional Comments + hip add  -OH              Exercise 8    Exercise Name 8 modified bird dog  -OH      Cueing 8 Verbal;Demo;Tactile  -OH      Sets 8 2  -OH      Reps 8 10  -OH      Time 8 small LE lift  -OH              Exercise 9    Exercise Name 9 SL thoracic rotation  -OH      Cueing 9 Verbal  -OH      Sets 9 1  -OH      Reps 9 15  -OH      Additional Comments with cervical follow  -OH              Exercise 10    Exercise Name 10 alt shoulder ext  -OH      Cueing 10 Verbal;Demo  -OH      Sets 10 2  -OH      Reps 10 10e  -OH      Time 10 GTB  -OH              Exercise 11    Exercise Name 11 doorway stretch  -OH      Cueing 11 Verbal;Demo  -OH      Reps 11 3  -OH      Time 11 20sec  -OH              Exercise 12    Exercise Name 12 S/L clamshell  -OH      Cueing 12 Verbal;Tactile  -OH      Reps 12 15ea  -OH      Time 12 RTB  -OH              Exercise 13    Exercise Name 13 S/L reverse clam  -OH      Cueing 13 Verbal;Tactile  -OH       Reps 13 15ea  -OH      Time 13 RTB  -OH              Exercise 14    Exercise Name 14 lateral stepping  -OH      Cueing 14 Verbal;Demo  -OH      Reps 14 3 laps  -OH      Time 14 RTB  -OH              Exercise 15    Exercise Name 15 AR press  -OH      Cueing 15 Verbal;Demo  -OH      Sets 15 2  -OH      Reps 15 10  -OH      Additional Comments GTB  -OH              Exercise 16    Exercise Name 16 standing PPT  -OH      Cueing 16 Verbal;Tactile;Demo  -OH      Sets 16 1  -OH      Reps 16 10  -OH      Time 16 5s  -OH      Additional Comments reduce sway back  -OH              Exercise 17    Exercise Name 17 monster walks  -OH      Cueing 17 Verbal;Demo  -OH      Reps 17 3 laps  -OH      Additional Comments RTB  -OH            User Key  (r) = Recorded By, (t) = Taken By, (c) = Cosigned By    Initials Name Provider Type    Giovanna Amaya, PT Physical Therapist                              PT OP Goals     Row Name 03/30/22 1600          PT Short Term Goals    STG Date to Achieve 03/03/22  -OH     STG 1 Patient independent and compliant with initial HEP focused on core/pelvic stabilization and flexibility techniques for muscle rebalance and decreased pain levels  -OH     STG 1 Progress Ongoing  -OH     STG 2 Patient demonstrate proper lift techniques for care of child with dressing, changing diapers, carrying, etc  -     STG 2 Progress Ongoing  -     STG 3 Patient able to perform PPT with proper form to improve TA contraction through functional mobility  -     STG 3 Progress Met  -     STG 4 Patient with improved posture to upright (decreased posterior lean)  -     STG 4 Progress Met  -            Long Term Goals    LTG Date to Achieve 05/02/22  -OH     LTG 1 Patient independent and compliant with advanced HEP for transition to self care of condition  -     LTG 1 Progress Ongoing  -     LTG 2 Patient demonstrate proper ergonomic set up for school related duties for decreased spinal pain  -      LTG 2 Progress Ongoing  -OH     LTG 3 Patient MMT B hip abductors/ER/IR in SL >/=4+/5 for improved stabilization of core/pelvis  -OH     LTG 3 Progress Ongoing  -OH     LTG 4 Patient with improved alignment of pelvic/innominates for decreased symptoms sacral/lumbar region  -OH     LTG 4 Progress Ongoing  -OH           User Key  (r) = Recorded By, (t) = Taken By, (c) = Cosigned By    Initials Name Provider Type    Giovanna Amaya, PT Physical Therapist                Therapy Education  Education Details: updated HEP, discussed postural positioning and to perform PPT when standing throughout the day  Given: HEP, Symptoms/condition management, Pain management, Posture/body mechanics  Program: Reinforced, Progressed  How Provided: Verbal, Demonstration, Written  Provided to: Patient  Level of Understanding: Verbalized, Demonstrated              Time Calculation:   Start Time: 1615  Stop Time: 1658  Time Calculation (min): 43 min  Timed Charges  74658 - PT Therapeutic Exercise Minutes: 43  Total Minutes  Timed Charges Total Minutes: 43   Total Minutes: 43  Therapy Charges for Today     Code Description Service Date Service Provider Modifiers Qty    [id number]  PT THER PROC EA 15 MIN 3/30/2022 Giovanna Means, PT GP 3                    Giovanna Means PT  3/30/2022

## 2022-04-01 ENCOUNTER — HOSPITAL ENCOUNTER (OUTPATIENT)
Dept: PHYSICAL THERAPY | Facility: HOSPITAL | Age: 27
Setting detail: THERAPIES SERIES
Discharge: HOME | End: 2022-04-01

## 2022-04-01 DIAGNOSIS — M40.50 LORDOSIS: ICD-10-CM

## 2022-04-01 DIAGNOSIS — M54.2 CHRONIC NECK PAIN: ICD-10-CM

## 2022-04-01 DIAGNOSIS — G89.29 CHRONIC NECK PAIN: ICD-10-CM

## 2022-04-01 DIAGNOSIS — R29.3 ABNORMAL POSTURE: ICD-10-CM

## 2022-04-01 DIAGNOSIS — G89.29 CHRONIC LOW BACK PAIN WITH BILATERAL SCIATICA, UNSPECIFIED BACK PAIN LATERALITY: Primary | ICD-10-CM

## 2022-04-01 DIAGNOSIS — M41.9 SCOLIOSIS OF THORACOLUMBAR SPINE, UNSPECIFIED SCOLIOSIS TYPE: ICD-10-CM

## 2022-04-01 DIAGNOSIS — N62 MACROMASTIA: ICD-10-CM

## 2022-04-01 DIAGNOSIS — M54.42 CHRONIC LOW BACK PAIN WITH BILATERAL SCIATICA, UNSPECIFIED BACK PAIN LATERALITY: Primary | ICD-10-CM

## 2022-04-01 DIAGNOSIS — M54.41 CHRONIC LOW BACK PAIN WITH BILATERAL SCIATICA, UNSPECIFIED BACK PAIN LATERALITY: Primary | ICD-10-CM

## 2022-04-01 PROCEDURE — 97110 THERAPEUTIC EXERCISES: CPT

## 2022-04-01 NOTE — THERAPY PROGRESS REPORT/RE-CERT
Outpatient Physical Therapy Ortho Progress Note  [facility]     Patient Name: Kristin Jones  : 1995  MRN: [id number]  Today's Date: 2022      Visit Date: 2022    Visit Dx:    ICD-10-CM ICD-9-CM   1. Chronic low back pain with bilateral sciatica, unspecified back pain laterality  M54.41 724.2    G89.29 724.3    M54.42 338.29   2. Lordosis  M40.50 737.20   3. Chronic neck pain  M54.2 723.1    G89.29 338.29   4. Abnormal posture  R29.3 781.92   5. Scoliosis of thoracolumbar spine, unspecified scoliosis type  M41.9 737.30   6. Macromastia  N62 611.1       Patient Active Problem List   Diagnosis   • Term pregnancy   • Breast lump   • Generalized anxiety disorder   • Lordosis   • Low back pain   • Migraine without aura, not refractory   • Obesity   • Smoker   • Dysmenorrhea        Past Medical History:   Diagnosis Date   • Chlamydia    • Depression    • Low blood sugar    • Spinal headache 20: spinal tap, : from epidural        Past Surgical History:   Procedure Laterality Date   • WISDOM TOOTH EXTRACTION  2015        PT Ortho     Row Name 22 1600       MMT Right Lower Ext    Rt Hip ABduction MMT, Gross Movement (4+/5) good plus  -OH    Rt Hip Internal (Medial) Rotation MMT, Gross Movement (4+/5) good plus  -OH    Rt Hip External (Lateral) Rotation MMT, Gross Movement (4+/5) good plus  -OH       MMT Left Lower Ext    Lt Hip ABduction MMT, Gross Movement (4+/5) good plus  -OH    Lt Hip Internal (Medial) Rotation MMT, Gross Movement (4+/5) good plus  -OH    Lt Hip External (Lateral) Rotation MMT, Gross Movement (4+/5) good plus  -OH          User Key  (r) = Recorded By, (t) = Taken By, (c) = Cosigned By    Initials Name Provider Type    Giovanna Amaya, PT Physical Therapist                             PT Assessment/Plan     Row Name 22 1700          PT Assessment    Assessment Comments Kristin Jones has been seen for 6 physical  therapy sessions for chronic low back pain pain with sciatica. Treatment has included therapeutic exercise and patient education with home exercise program . Progress to physical therapy goals is good. Pt has met 3/4 STG and 1/4 LTG and slowly progressing towards all remaining goals. Patient reports overall 60% functional ability in comparison to PLOF with primary complaint involving general BLE weakness and apprehension of potential onset of lumbar paraspinal muscle spasms. Patient expresses significant reduction in frequency/intensity of spasm sensation secondary to reducing various activities leading to pain. She also reports 30% improvement in ability to  her 27lb, 16 month old son. Patient's self-scored Modified Oswestry improved from 64% to 42% (where 0% is no disability). Time spent reviewing previously completed interventions and adding SA row with SLS and lifting interventions to mimic lifting child. She will benefit from continued skilled physical therapy to address remaining impairments and functional limitations.  -OH            PT Plan    PT Plan Comments Print schedule, d/c bridges, continue with lifting mechanics and TA activation.  -OH           User Key  (r) = Recorded By, (t) = Taken By, (c) = Cosigned By    Initials Name Provider Type    Giovanna Amaya, PT Physical Therapist                   OP Exercises     Row Name 04/01/22 1600             Subjective Comments    Subjective Comments I had no inc LBP or soreness after last session but I do feel like my L shoulder blade is catching some when I move my arm  -OH              Subjective Pain    Able to rate subjective pain? yes  -OH      Pre-Treatment Pain Level 5  -OH              Total Minutes    83506 - PT Therapeutic Exercise Minutes 43  -OH              Exercise 1    Exercise Name 1 NuStep  -OH      Cueing 1 Verbal;Demo  -OH      Time 1 5 min  -OH              Exercise 4    Exercise Name 4 PPT + bridge  -OH      Cueing 4  Verbal;Demo  -OH      Sets 4 2  -OH      Reps 4 10  -OH      Time 4 2-3sec  -OH      Additional Comments + hip add  -OH              Exercise 6    Exercise Name 6 Time spent filling out survey, taking MMT, discussing POC and HEP  -OH              Exercise 8    Exercise Name 8 modified bird dog  -OH      Cueing 8 Verbal;Demo;Tactile  -OH      Sets 8 2  -OH      Reps 8 10  -OH      Time 8 small LE lift  -OH      Additional Comments 1#  -OH              Exercise 9    Exercise Name 9 SL thoracic rotation  -OH      Cueing 9 Verbal  -OH      Sets 9 1  -OH      Reps 9 15  -OH      Additional Comments with cervical follow  -OH              Exercise 12    Exercise Name 12 S/L clamshell  -OH      Additional Comments HEP only  -OH              Exercise 13    Exercise Name 13 S/L reverse clam  -OH      Additional Comments HEP only  -OH              Exercise 14    Exercise Name 14 lateral stepping  -OH      Cueing 14 Verbal;Demo  -OH      Reps 14 3 laps  -OH      Time 14 RTB  -OH              Exercise 17    Exercise Name 17 monster walks  -OH      Cueing 17 Verbal;Demo  -OH      Reps 17 3 laps  -OH      Additional Comments RTB  -OH              Exercise 18    Exercise Name 18 SA row with SLS  -OH      Cueing 18 Verbal;Tactile;Demo  -OH      Sets 18 1  -OH      Reps 18 15e  -OH      Additional Comments RTB  -OH              Exercise 19    Exercise Name 19 seated UT/LS stretch  -OH      Cueing 19 Verbal;Tactile;Demo  -OH      Reps 19 3  -OH      Time 19 20s  -OH      Additional Comments L only  -OH              Exercise 20    Exercise Name 20 lifting wedge (baby) from floor and carrying to table 5  -OH      Cueing 20 Verbal;Tactile;Demo  -OH      Sets 20 1  -OH      Reps 20 6  -OH      Additional Comments heavy cues on mechanics  -OH            User Key  (r) = Recorded By, (t) = Taken By, (c) = Cosigned By    Initials Name Provider Type    Giovanna Amaya, PT Physical Therapist                              PT OP Goals      Row Name 04/01/22 1600          PT Short Term Goals    STG Date to Achieve 03/03/22  -     STG 1 Patient independent and compliant with initial HEP focused on core/pelvic stabilization and flexibility techniques for muscle rebalance and decreased pain levels  -     STG 1 Progress Met  -     STG 2 Patient demonstrate proper lift techniques for care of child with dressing, changing diapers, carrying, etc  -     STG 2 Progress Ongoing  -     STG 3 Patient able to perform PPT with proper form to improve TA contraction through functional mobility  -     STG 3 Progress Met  -     STG 4 Patient with improved posture to upright (decreased posterior lean)  -     STG 4 Progress Met  -            Long Term Goals    LTG Date to Achieve 05/02/22  -     LTG 1 Patient independent and compliant with advanced HEP for transition to self care of condition  -     LTG 1 Progress Ongoing  -     LTG 2 Patient demonstrate proper ergonomic set up for school related duties for decreased spinal pain  -     LTG 2 Progress Ongoing  -     LTG 3 Patient MMT B hip abductors/ER/IR in SL >/=4+/5 for improved stabilization of core/pelvis  -     LTG 3 Progress Met  -     LTG 4 Patient with improved alignment of pelvic/innominates for decreased symptoms sacral/lumbar region  -     LTG 4 Progress Ongoing  [facility]           User Key  (r) = Recorded By, (t) = Taken By, (c) = Cosigned By    Initials Name Provider Type    Giovanna Amaya, PT Physical Therapist                     Outcome Measure Options: Cecil Boyle  Modified Oswestry  Modified Oswestry Score/Comments: 42%      Time Calculation:   Start Time: 1617  Stop Time: 1700  Time Calculation (min): 43 min  Timed Charges  03272 - PT Therapeutic Exercise Minutes: 43  Total Minutes  Timed Charges Total Minutes: 43   Total Minutes: 43  Therapy Charges for Today     Code Description Service Date Service Provider Modifiers Qty    85909068930  PT THER PROC  EA 15 MIN 4/1/2022 Clary, Giovanna Ortiz, PT GP 3          PT G-Codes  Outcome Measure Options: Modifed Owestry  Modified Oswestry Score/Comments: 42%         Giovanna Means, PT  4/1/2022

## 2022-04-08 ENCOUNTER — TELEPHONE (OUTPATIENT)
Dept: PHYSICAL THERAPY | Facility: HOSPITAL | Age: 27
End: 2022-04-08

## 2022-04-29 ENCOUNTER — TELEPHONE (OUTPATIENT)
Dept: PHYSICAL THERAPY | Facility: HOSPITAL | Age: 27
End: 2022-04-29

## 2022-05-03 ENCOUNTER — HOSPITAL ENCOUNTER (OUTPATIENT)
Dept: PHYSICAL THERAPY | Facility: HOSPITAL | Age: 27
Setting detail: THERAPIES SERIES
Discharge: HOME | End: 2022-05-03

## 2022-05-03 DIAGNOSIS — M54.41 CHRONIC LOW BACK PAIN WITH BILATERAL SCIATICA, UNSPECIFIED BACK PAIN LATERALITY: Primary | ICD-10-CM

## 2022-05-03 DIAGNOSIS — M40.50 LORDOSIS: ICD-10-CM

## 2022-05-03 DIAGNOSIS — N62 MACROMASTIA: ICD-10-CM

## 2022-05-03 DIAGNOSIS — G89.29 CHRONIC NECK PAIN: ICD-10-CM

## 2022-05-03 DIAGNOSIS — M41.9 SCOLIOSIS OF THORACOLUMBAR SPINE, UNSPECIFIED SCOLIOSIS TYPE: ICD-10-CM

## 2022-05-03 DIAGNOSIS — M54.42 CHRONIC LOW BACK PAIN WITH BILATERAL SCIATICA, UNSPECIFIED BACK PAIN LATERALITY: Primary | ICD-10-CM

## 2022-05-03 DIAGNOSIS — M54.2 CHRONIC NECK PAIN: ICD-10-CM

## 2022-05-03 DIAGNOSIS — R29.3 ABNORMAL POSTURE: ICD-10-CM

## 2022-05-03 DIAGNOSIS — G89.29 CHRONIC LOW BACK PAIN WITH BILATERAL SCIATICA, UNSPECIFIED BACK PAIN LATERALITY: Primary | ICD-10-CM

## 2022-05-03 PROCEDURE — 97110 THERAPEUTIC EXERCISES: CPT

## 2022-05-03 NOTE — THERAPY PROGRESS REPORT/RE-CERT
Outpatient Physical Therapy Ortho Re-Assessment  [facility]     Patient Name: Kristin Jones  : 1995  MRN: [id number]  Today's Date: 5/3/2022      Visit Date: 2022    Patient Active Problem List   Diagnosis   • Term pregnancy   • Breast lump   • Generalized anxiety disorder   • Lordosis   • Low back pain   • Migraine without aura, not refractory   • Obesity   • Smoker   • Dysmenorrhea        Past Medical History:   Diagnosis Date   • Chlamydia    • Depression    • Low blood sugar    • Spinal headache 20: spinal tap, : from epidural        Past Surgical History:   Procedure Laterality Date   • WISDOM TOOTH EXTRACTION  2015       Visit Dx:     ICD-10-CM ICD-9-CM   1. Chronic low back pain with bilateral sciatica, unspecified back pain laterality  M54.41 724.2    G89.29 724.3    M54.42 338.29   2. Lordosis  M40.50 737.20   3. Chronic neck pain  M54.2 723.1    G89.29 338.29   4. Abnormal posture  R29.3 781.92   5. Scoliosis of thoracolumbar spine, unspecified scoliosis type  M41.9 737.30   6. Macromastia  N62 611.1                                    PT OP Goals     Row Name 22 1700          PT Short Term Goals    STG Date to Achieve 22  -     STG 1 Patient independent and compliant with initial HEP focused on core/pelvic stabilization and flexibility techniques for muscle rebalance and decreased pain levels  -     STG 1 Progress Met  -     STG 2 Patient demonstrate proper lift techniques for care of child with dressing, changing diapers, carrying, etc  -     STG 2 Progress Met  -OH     STG 3 Patient able to perform PPT with proper form to improve TA contraction through functional mobility  -     STG 3 Progress Met  -OH     STG 4 Patient with improved posture to upright (decreased posterior lean)  -     STG 4 Progress Met  -OH            Long Term Goals    LTG Date to Achieve 22  -     LTG 1 Patient independent and compliant with  advanced HEP for transition to self care of condition  -OH     LTG 1 Progress Ongoing  -OH     LTG 2 Patient demonstrate proper ergonomic set up for school related duties for decreased spinal pain  -OH     LTG 2 Progress Ongoing  -OH     LTG 3 Patient MMT B hip abductors/ER/IR in SL >/=4+/5 for improved stabilization of core/pelvis  -OH     LTG 3 Progress Met  -OH     LTG 4 Patient with improved alignment of pelvic/innominates for decreased symptoms sacral/lumbar region  -OH     LTG 4 Progress Partially Met  -OH           User Key  (r) = Recorded By, (t) = Taken By, (c) = Cosigned By    Initials Name Provider Type    Giovanna Amaya, PT Physical Therapist                 PT Assessment/Plan     Row Name 05/03/22 1700          PT Assessment    Functional Limitations Limitation in home management;Limitations in community activities;Performance in leisure activities;Performance in sport activities;Performance in work activities  -     Impairments Impaired flexibility;Impaired postural alignment;Joint integrity;Muscle strength;Pain;Poor body mechanics;Posture  -     Assessment Comments Aliyahpamellaluz maria LARKIN LIMA Robert has been seen for 7 physical therapy sessions for chronic low back pain with sciatica. Patient returns to PT after 4.5 weeks secondary to scheduling conflict and 2 consecutive no shows. Despite inability to attend skilled PT session over last month, patient continues to report consistent progress in neck/back pain due to HEP compliance and improved postural awareness. Upon arrival patient demonstrates noted improvement in standing posture with mild postural sway. Treatment has included therapeutic exercise and patient education with home exercise program. Progress to physical therapy goals is good. Pt has met 4/4 STG and 1/4 LTG and slowly progressing towards all remaining goals. Patient unable to meet ergonomic goal due to limited sessions to address set up. Patient reports overall 65% functional  ability in comparison to PLOF with primary complaint involving general BLE weakness and apprehension of potential onset of lumbar paraspinal muscle spasms. Patient continues to express significant reduction in frequency/intensity of spasm sensation secondary to reducing various activities leading to pain. She also reports 75% improvement in ability to  her 27lb, 17 month old son. Patient expresses noted improvement following assessment of squat mechanics last session and integrating PPT in standing position to reduce postural sway. Patient's self-scored Modified Oswestry improved from 64% on initial evaluation to 32% (where 0% is no disability). Time spent reviewing previously completed interventions and adding squat chop with good tolerance. She will benefit from continued skilled physical therapy to address remaining impairments and functional limitations.  -OH     Please refer to paper survey for additional self-reported information Yes  -OH     Rehab Potential Good  -OH     Patient/caregiver participated in establishment of treatment plan and goals Yes  -OH     Patient would benefit from skilled therapy intervention Yes  -OH            PT Plan    PT Frequency 1x/week;2x/week  -OH     Predicted Duration of Therapy Intervention (PT) 6-8 visits  -OH     Planned CPT's? PT RE-EVAL: 34466;PT THER PROC EA 15 MIN: 64356;PT THER ACT EA 15 MIN: 83312;PT MANUAL THERAPY EA 15 MIN: 67547;PT NEUROMUSC RE-EDUCATION EA 15 MIN: 79764;PT GAIT TRAINING EA 15 MIN: 05923;PT SELF CARE/HOME MGMT/TRAIN EA 15: 83372;PT HOT OR COLD PACK TREAT [facility];PT ELECTRICAL STIM UNATTEND: ;PT TRACTION LUMBAR: 57969  -OH     PT Plan Comments consider stir the pot and goblet squat?  -OH           User Key  (r) = Recorded By, (t) = Taken By, (c) = Cosigned By    Initials Name Provider Type    Giovanna Amaya, PT Physical Therapist                   OP Exercises     Row Name 05/03/22 1800             Subjective Comments     Subjective Comments Things have gone alright over the last month. I am doing better when picking up my son.  -OH              Subjective Pain    Able to rate subjective pain? yes  -OH      Pre-Treatment Pain Level 4  -OH              Total Minutes    05767 - PT Therapeutic Exercise Minutes 40  -OH              Exercise 1    Exercise Name 1 NuStep  -OH      Cueing 1 Verbal;Demo  -OH      Time 1 5 min  -OH              Exercise 5    Exercise Name 5 squat chop  -OH      Cueing 5 Verbal;Demo  -OH      Sets 5 1  -OH      Reps 5 15e  -OH      Time 5 L2  -OH              Exercise 6    Exercise Name 6 Time spent filling out survey, taking MMT, discussing POC and HEP  -OH              Exercise 10    Exercise Name 10 alt shoulder ext  -OH      Cueing 10 Verbal;Demo  -OH      Sets 10 2  -OH      Reps 10 10e  -OH      Time 10 GTB  -OH              Exercise 14    Exercise Name 14 lateral stepping  -OH      Cueing 14 Verbal;Demo  -OH      Reps 14 3 laps  -OH      Time 14 RTB  -OH              Exercise 15    Exercise Name 15 AR press  -OH      Cueing 15 Verbal;Demo  -OH      Sets 15 2  -OH      Reps 15 10  -OH      Additional Comments GTB  -OH              Exercise 17    Exercise Name 17 monster walks  -OH      Cueing 17 Verbal;Demo  -OH      Reps 17 3 laps  -OH      Additional Comments RTB  -OH              Exercise 18    Exercise Name 18 SA row with SLS  -OH      Cueing 18 Verbal;Tactile;Demo  -OH      Sets 18 2  -OH      Reps 18 10  -OH      Additional Comments GTB  -OH              Exercise 19    Exercise Name 19 seated UT/LS stretch  -OH      Cueing 19 Verbal;Tactile;Demo  -OH      Reps 19 3  -OH      Time 19 20s  -OH      Additional Comments L only  -OH              Exercise 20    Exercise Name 20 lifting wedge (baby) from floor and carrying to table 5  -OH      Cueing 20 Verbal;Tactile;Demo  -OH      Sets 20 1  -OH      Reps 20 10  -OH      Additional Comments heavy cues on mechanics  -OH            User Key  (r) = Recorded  By, (t) = Taken By, (c) = Cosigned By    Initials Name Provider Type    Giovanna Amaya, PT Physical Therapist                              Outcome Measure Options: Modified Oswestry  Modified Oswestry  Modified Oswestry Score/Comments: 32%      Time Calculation:     Start Time: 1748  Stop Time: 1828  Time Calculation (min): 40 min  Timed Charges  57519 - PT Therapeutic Exercise Minutes: 40  Total Minutes  Timed Charges Total Minutes: 40   Total Minutes: 40     Therapy Charges for Today     Code Description Service Date Service Provider Modifiers Qty    35119626149  PT THER PROC EA 15 MIN 5/3/2022 Giovanna Means, PT GP 3          PT G-Codes  Outcome Measure Options: Modified Oswestry  Modified Oswestry Score/Comments: 32%         Giovanna Means, PT  5/3/2022

## 2022-05-06 ENCOUNTER — HOSPITAL ENCOUNTER (OUTPATIENT)
Dept: PHYSICAL THERAPY | Facility: HOSPITAL | Age: 27
Setting detail: THERAPIES SERIES
Discharge: HOME | End: 2022-05-06

## 2022-05-06 DIAGNOSIS — G89.29 CHRONIC LOW BACK PAIN WITH BILATERAL SCIATICA, UNSPECIFIED BACK PAIN LATERALITY: Primary | ICD-10-CM

## 2022-05-06 DIAGNOSIS — N62 MACROMASTIA: ICD-10-CM

## 2022-05-06 DIAGNOSIS — M40.50 LORDOSIS: ICD-10-CM

## 2022-05-06 DIAGNOSIS — G89.29 CHRONIC NECK PAIN: ICD-10-CM

## 2022-05-06 DIAGNOSIS — R29.3 ABNORMAL POSTURE: ICD-10-CM

## 2022-05-06 DIAGNOSIS — M54.2 CHRONIC NECK PAIN: ICD-10-CM

## 2022-05-06 DIAGNOSIS — M54.42 CHRONIC LOW BACK PAIN WITH BILATERAL SCIATICA, UNSPECIFIED BACK PAIN LATERALITY: Primary | ICD-10-CM

## 2022-05-06 DIAGNOSIS — M41.9 SCOLIOSIS OF THORACOLUMBAR SPINE, UNSPECIFIED SCOLIOSIS TYPE: ICD-10-CM

## 2022-05-06 DIAGNOSIS — M54.41 CHRONIC LOW BACK PAIN WITH BILATERAL SCIATICA, UNSPECIFIED BACK PAIN LATERALITY: Primary | ICD-10-CM

## 2022-05-06 PROCEDURE — 97110 THERAPEUTIC EXERCISES: CPT

## 2022-05-06 NOTE — THERAPY TREATMENT NOTE
"    Outpatient Physical Therapy Ortho Treatment Note  [facility]     Patient Name: Kristin Jones  : 1995  MRN: [id number]  Today's Date: 2022      Visit Date: 2022    Visit Dx:    ICD-10-CM ICD-9-CM   1. Chronic low back pain with bilateral sciatica, unspecified back pain laterality  M54.41 724.2    G89.29 724.3    M54.42 338.29   2. Lordosis  M40.50 737.20   3. Chronic neck pain  M54.2 723.1    G89.29 338.29   4. Abnormal posture  R29.3 781.92   5. Scoliosis of thoracolumbar spine, unspecified scoliosis type  M41.9 737.30   6. Macromastia  N62 611.1       Patient Active Problem List   Diagnosis   • Term pregnancy   • Breast lump   • Generalized anxiety disorder   • Lordosis   • Low back pain   • Migraine without aura, not refractory   • Obesity   • Smoker   • Dysmenorrhea        Past Medical History:   Diagnosis Date   • Chlamydia    • Depression    • Low blood sugar    • Spinal headache 20: spinal tap, : from epidural        Past Surgical History:   Procedure Laterality Date   • WISDOM TOOTH EXTRACTION  2015                        PT Assessment/Plan     Row Name 22 1600          PT Assessment    Assessment Comments Kristin returns to clinic with reports of some increase in knee pain this date but otherwise continuing to do well. Resumed bird dog, open book, and added modified plank and \"stir the pot\", and straight arm down to progress core stability, as well as increased resistance as side-steps and monster walk. Pt. tolerated well though met challenge with expected fatigue.  -            PT Plan    PT Plan Comments consider plank push up  -           User Key  (r) = Recorded By, (t) = Taken By, (c) = Cosigned By    Initials Name Provider Type    Anisa Han, PT Physical Therapist                   OP Exercises     Row Name 22 1600             Subjective Comments    Subjective Comments My knees hurt but I dont know why  -        "       Total Minutes    47286 - PT Therapeutic Exercise Minutes 39  -MH              Exercise 1    Exercise Name 1 NuStep  -MH      Cueing 1 Verbal;Demo  -MH      Time 1 5 min  -MH              Exercise 4    Exercise Name 4 straight arm pull down  -MH      Cueing 4 Verbal;Demo  -MH      Sets 4 2  -MH      Reps 4 10  -MH      Time 4 40#  -MH              Exercise 6    Exercise Name 6 modified plank  -MH      Cueing 6 Verbal;Demo  -MH      Reps 6 3  -MH      Time 6 30sec  -MH      Additional Comments hands on elevated mat  -MH              Exercise 7    Exercise Name 7 stir the pot  -MH      Cueing 7 Verbal;Demo  -MH      Sets 7 2  -MH      Reps 7 10e  -MH      Time 7 CW/CCW  -MH      Additional Comments modified on table  -MH              Exercise 8    Exercise Name 8 modified bird dog  -MH      Cueing 8 Verbal;Demo;Tactile  -MH      Reps 8 10e  -MH      Time 8 small LE lift  -MH      Additional Comments 1#  -MH              Exercise 9    Exercise Name 9 SL thoracic rotation  -MH      Cueing 9 Verbal  -MH      Sets 9 1  -MH      Reps 9 10e  -MH      Time 9 cervical following  -MH              Exercise 10    Exercise Name 10 alt shoulder ext  -MH      Cueing 10 Verbal;Demo  -MH      Sets 10 2  -MH      Reps 10 10e  -MH      Time 10 GTB  -MH              Exercise 14    Exercise Name 14 lateral stepping  -MH      Cueing 14 Verbal;Demo  -MH      Reps 14 3 laps  -MH      Time 14 GTB  -MH              Exercise 15    Exercise Name 15 AR press  -MH      Cueing 15 Verbal;Demo  -MH      Sets 15 2  -MH      Reps 15 10  -MH      Additional Comments GTB  -MH              Exercise 17    Exercise Name 17 monster walks  -MH      Cueing 17 Verbal;Demo  -MH      Reps 17 3 laps  -MH      Additional Comments GTB  -MH              Exercise 18    Exercise Name 18 SA row with SLS  -MH      Cueing 18 Verbal;Tactile;Demo  -MH      Sets 18 2  -MH      Reps 18 10  -MH      Additional Comments GTB  -MH            User Key  (r) = Recorded By, (t)  = Taken By, (c) = Cosigned By    Initials Name Provider Type     Anisa Camejo, PT Physical Therapist                              PT OP Goals     Row Name 05/06/22 1600          PT Short Term Goals    STG Date to Achieve 03/03/22  -     STG 1 Patient independent and compliant with initial HEP focused on core/pelvic stabilization and flexibility techniques for muscle rebalance and decreased pain levels  -     STG 1 Progress Met  -     STG 2 Patient demonstrate proper lift techniques for care of child with dressing, changing diapers, carrying, etc  -     STG 2 Progress Met  -     STG 3 Patient able to perform PPT with proper form to improve TA contraction through functional mobility  -     STG 3 Progress Met  -     STG 4 Patient with improved posture to upright (decreased posterior lean)  -     STG 4 Progress Met  [facility]            Long Term Goals    LTG Date to Achieve 05/02/22  -     LTG 1 Patient independent and compliant with advanced HEP for transition to self care of condition  -     LTG 1 Progress Ongoing  -     LTG 2 Patient demonstrate proper ergonomic set up for school related duties for decreased spinal pain  -     LTG 2 Progress Ongoing  -     LTG 3 Patient MMT B hip abductors/ER/IR in SL >/=4+/5 for improved stabilization of core/pelvis  -     LTG 3 Progress Met  [facility]     LTG 4 Patient with improved alignment of pelvic/innominates for decreased symptoms sacral/lumbar region  -     LTG 4 Progress Partially Met  -           User Key  (r) = Recorded By, (t) = Taken By, (c) = Cosigned By    Initials Name Provider Type     Anisa Camejo, PT Physical Therapist                               Time Calculation:   Start Time: 1615  Stop Time: 1654  Time Calculation (min): 39 min  Total Timed Code Minutes- PT: 39 minute(s)  Timed Charges  45481 - PT Therapeutic Exercise Minutes: 39  Total Minutes  Timed Charges Total Minutes: 39   Total Minutes: 39  Therapy Charges for Today      Code Description Service Date Service Provider Modifiers Qty    58849479857 HC PT THER PROC EA 15 MIN 5/6/2022 Anisa Camejo, PT GP 3                    Anisa Camejo, PT  5/6/2022

## 2022-05-09 ENCOUNTER — HOSPITAL ENCOUNTER (OUTPATIENT)
Dept: PHYSICAL THERAPY | Facility: HOSPITAL | Age: 27
Setting detail: THERAPIES SERIES
Discharge: HOME | End: 2022-05-09

## 2022-05-09 DIAGNOSIS — G89.29 CHRONIC NECK PAIN: ICD-10-CM

## 2022-05-09 DIAGNOSIS — G89.29 CHRONIC LOW BACK PAIN WITH BILATERAL SCIATICA, UNSPECIFIED BACK PAIN LATERALITY: Primary | ICD-10-CM

## 2022-05-09 DIAGNOSIS — R29.3 ABNORMAL POSTURE: ICD-10-CM

## 2022-05-09 DIAGNOSIS — M40.50 LORDOSIS: ICD-10-CM

## 2022-05-09 DIAGNOSIS — N62 MACROMASTIA: ICD-10-CM

## 2022-05-09 DIAGNOSIS — M54.41 CHRONIC LOW BACK PAIN WITH BILATERAL SCIATICA, UNSPECIFIED BACK PAIN LATERALITY: Primary | ICD-10-CM

## 2022-05-09 DIAGNOSIS — M41.9 SCOLIOSIS OF THORACOLUMBAR SPINE, UNSPECIFIED SCOLIOSIS TYPE: ICD-10-CM

## 2022-05-09 DIAGNOSIS — M54.2 CHRONIC NECK PAIN: ICD-10-CM

## 2022-05-09 DIAGNOSIS — M54.42 CHRONIC LOW BACK PAIN WITH BILATERAL SCIATICA, UNSPECIFIED BACK PAIN LATERALITY: Primary | ICD-10-CM

## 2022-05-09 PROCEDURE — 97110 THERAPEUTIC EXERCISES: CPT

## 2022-05-09 NOTE — THERAPY TREATMENT NOTE
Outpatient Physical Therapy Ortho Treatment Note  [facility]     Patient Name: Kristin Jones  : 1995  MRN: [id number]  Today's Date: 2022      Visit Date: 2022    Visit Dx:    ICD-10-CM ICD-9-CM   1. Chronic low back pain with bilateral sciatica, unspecified back pain laterality  M54.41 724.2    G89.29 724.3    M54.42 338.29   2. Lordosis  M40.50 737.20   3. Chronic neck pain  M54.2 723.1    G89.29 338.29   4. Scoliosis of thoracolumbar spine, unspecified scoliosis type  M41.9 737.30   5. Abnormal posture  R29.3 781.92   6. Macromastia  N62 611.1       Patient Active Problem List   Diagnosis   • Term pregnancy   • Breast lump   • Generalized anxiety disorder   • Lordosis   • Low back pain   • Migraine without aura, not refractory   • Obesity   • Smoker   • Dysmenorrhea        Past Medical History:   Diagnosis Date   • Chlamydia    • Depression    • Low blood sugar    • Spinal headache 20: spinal tap, : from epidural        Past Surgical History:   Procedure Laterality Date   • WISDOM TOOTH EXTRACTION  2015                        PT Assessment/Plan     Row Name 22 1600          PT Assessment    Assessment Comments Kristin returns to clinic, reports UE muscle soreness following last session likely related to addition of straight arm pull down. Continued with exercise though did reduce weight to improve form/tolerance. Progressed focus on core strength with addition of plank + BOSU, squat with press out, suitcase carry all met with good tolerance. Pt. verbalizes improved confidence of body awareness/ability to correct posture with current ther ex program. Discussed plan to transition to independent program following remaining visits with plan to return in ~1 month to assess progress, pt. in agreement with plan pending continued progress over remaining 3 visits.  -            PT Plan    PT Plan Comments update HEP  -           User Key  (r) =  Recorded By, (t) = Taken By, (c) = Cosigned By    Initials Name Provider Type    Anisa Han, PT Physical Therapist                   OP Exercises     Row Name 05/09/22 1600             Subjective Comments    Subjective Comments My arms are still killing me from the other day, I could tell I was fatigued. It's muscle soreness.  -MH              Total Minutes    99764 - PT Therapeutic Exercise Minutes 38  -MH              Exercise 1    Exercise Name 1 NuStep  -MH      Cueing 1 Verbal;Demo  -      Time 1 5 min  -MH              Exercise 2    Exercise Name 2 A-R + trunk rotation  -MH      Cueing 2 Verbal;Demo  -MH      Reps 2 10e  -MH      Time 2 20#  -MH      Additional Comments dowel at tuff stuff, cues to keep center of bar in line with COM, maintain slight knee bend  -              Exercise 3    Exercise Name 3 single arm shoulder ext in SLS  -MH      Cueing 3 Verbal;Demo  -      Sets 3 2  -MH      Reps 3 10  -MH      Time 3 cues for level pelvis, engage glute and core and scap retract  -      Additional Comments 20# tuff stuff  -MH              Exercise 4    Exercise Name 4 straight arm pull down  -MH      Cueing 4 Verbal;Demo  -      Sets 4 2  -MH      Reps 4 10  -MH      Time 4 30#  -MH              Exercise 5    Exercise Name 5 suitcase carry  -MH      Cueing 5 Verbal  -MH      Reps 5 2 laps each  -MH      Time 5 10# DB  -MH              Exercise 6    Exercise Name 6 modified plank  -MH      Cueing 6 Verbal;Demo  -      Reps 6 3  -MH      Time 6 30sec  -MH      Additional Comments hands on BOSU rocking laterally  -MH              Exercise 7    Exercise Name 7 squat (tap at chair)  -MH      Cueing 7 Verbal;Demo  -      Sets 7 2  -MH      Reps 7 10  -MH      Time 7 + press out L2  -MH              Exercise 14    Exercise Name 14 lateral stepping  -MH      Cueing 14 Verbal;Demo  -      Reps 14 4 laps GTB  -MH              Exercise 17    Exercise Name 17 monster walks  -      Cueing 17  Verbal;Demo  -      Reps 17 4 laps  -      Time 17 GTB  -            User Key  (r) = Recorded By, (t) = Taken By, (c) = Cosigned By    Initials Name Provider Type    Anisa Han PT Physical Therapist                              PT OP Goals     Row Name 05/09/22 1600          PT Short Term Goals    STG Date to Achieve 03/03/22  -     STG 1 Patient independent and compliant with initial HEP focused on core/pelvic stabilization and flexibility techniques for muscle rebalance and decreased pain levels  -     STG 1 Progress Met  [facility]     STG 2 Patient demonstrate proper lift techniques for care of child with dressing, changing diapers, carrying, etc  -     STG 2 Progress Met  [facility]     STG 3 Patient able to perform PPT with proper form to improve TA contraction through functional mobility  -     STG 3 Progress Met  [facility]     STG 4 Patient with improved posture to upright (decreased posterior lean)  -     STG 4 Progress [facility]            Long Term Goals    LTG Date to Achieve 05/02/22  -     LTG 1 Patient independent and compliant with advanced HEP for transition to self care of condition  -     LTG 1 Progress Ongoing  [facility]     LTG 2 Patient demonstrate proper ergonomic set up for school related duties for decreased spinal pain  -     LTG 2 Progress Ongoing  [facility]     LTG 3 Patient MMT B hip abductors/ER/IR in SL >/=4+/5 for improved stabilization of core/pelvis  -     LTG 3 Progress Met  [facility]     LTG 4 Patient with improved alignment of pelvic/innominates for decreased symptoms sacral/lumbar region  -     LTG 4 Progress Partially Met  [facility]           User Key  (r) = Recorded By, (t) = Taken By, (c) = Cosigned By    Initials Name Provider Type    Anisa Han PT Physical Therapist                Therapy Education  Given: Symptoms/condition management, Posture/body mechanics  Program: Reinforced  How Provided: Verbal, Demonstration  Provided to: Patient  Level of Understanding: Verbalized,  Demonstrated              Time Calculation:   Start Time: 1617  Stop Time: 1655  Time Calculation (min): 38 min  Total Timed Code Minutes- PT: 38 minute(s)  Timed Charges  58444 - PT Therapeutic Exercise Minutes: 38  Total Minutes  Timed Charges Total Minutes: 38   Total Minutes: 38  Therapy Charges for Today     Code Description Service Date Service Provider Modifiers Qty    10661848945 HC PT THER PROC EA 15 MIN 5/9/2022 Anisa Camejo, PT GP 3                    Anisa Camejo PT  5/9/2022

## 2022-05-13 ENCOUNTER — HOSPITAL ENCOUNTER (OUTPATIENT)
Dept: PHYSICAL THERAPY | Facility: HOSPITAL | Age: 27
Setting detail: THERAPIES SERIES
Discharge: HOME | End: 2022-05-13

## 2022-05-13 DIAGNOSIS — G89.29 CHRONIC NECK PAIN: ICD-10-CM

## 2022-05-13 DIAGNOSIS — M54.2 CHRONIC NECK PAIN: ICD-10-CM

## 2022-05-13 DIAGNOSIS — M54.42 CHRONIC LOW BACK PAIN WITH BILATERAL SCIATICA, UNSPECIFIED BACK PAIN LATERALITY: Primary | ICD-10-CM

## 2022-05-13 DIAGNOSIS — N62 MACROMASTIA: ICD-10-CM

## 2022-05-13 DIAGNOSIS — R29.3 ABNORMAL POSTURE: ICD-10-CM

## 2022-05-13 DIAGNOSIS — M40.50 LORDOSIS: ICD-10-CM

## 2022-05-13 DIAGNOSIS — M41.9 SCOLIOSIS OF THORACOLUMBAR SPINE, UNSPECIFIED SCOLIOSIS TYPE: ICD-10-CM

## 2022-05-13 DIAGNOSIS — M54.41 CHRONIC LOW BACK PAIN WITH BILATERAL SCIATICA, UNSPECIFIED BACK PAIN LATERALITY: Primary | ICD-10-CM

## 2022-05-13 DIAGNOSIS — G89.29 CHRONIC LOW BACK PAIN WITH BILATERAL SCIATICA, UNSPECIFIED BACK PAIN LATERALITY: Primary | ICD-10-CM

## 2022-05-13 PROCEDURE — 97110 THERAPEUTIC EXERCISES: CPT

## 2022-05-13 NOTE — THERAPY TREATMENT NOTE
Outpatient Physical Therapy Ortho Treatment Note  [facility]     Patient Name: Kristin Jones  : 1995  MRN: [id number]  Today's Date: 2022      Visit Date: 2022    Visit Dx:    ICD-10-CM ICD-9-CM   1. Chronic low back pain with bilateral sciatica, unspecified back pain laterality  M54.41 724.2    G89.29 724.3    M54.42 338.29   2. Lordosis  M40.50 737.20   3. Chronic neck pain  M54.2 723.1    G89.29 338.29   4. Scoliosis of thoracolumbar spine, unspecified scoliosis type  M41.9 737.30   5. Abnormal posture  R29.3 781.92   6. Macromastia  N62 611.1       Patient Active Problem List   Diagnosis   • Term pregnancy   • Breast lump   • Generalized anxiety disorder   • Lordosis   • Low back pain   • Migraine without aura, not refractory   • Obesity   • Smoker   • Dysmenorrhea        Past Medical History:   Diagnosis Date   • Chlamydia    • Depression    • Low blood sugar    • Spinal headache 20: spinal tap, : from epidural        Past Surgical History:   Procedure Laterality Date   • WISDOM TOOTH EXTRACTION  2015                        PT Assessment/Plan     Row Name 22 1600          PT Assessment    Assessment Comments Kristin continues to progress well toward remaining goals and consistently compliant with HEP. Reviewed previously completed interventions with mild progressions made within. Added stir the pot with cable and updated HEP. Reviewed changes with patient. She remains a candidate for skilled PT and appropriate to transition to independent HEP in 1-2 sessions.  -OH            PT Plan    PT Plan Comments update HEP, consider mountain climbers with resistance, OH AR press  -OH           User Key  (r) = Recorded By, (t) = Taken By, (c) = Cosigned By    Initials Name Provider Type    Giovanna Amaya, PT Physical Therapist                   OP Exercises     Row Name 22 1600             Subjective Comments    Subjective Comments  Things are going really well  -OH              Total Minutes    40954 - PT Therapeutic Exercise Minutes 40  -OH              Exercise 1    Exercise Name 1 NuStep  -OH      Cueing 1 Verbal;Demo  -OH      Time 1 5 min  -OH              Exercise 2    Exercise Name 2 A-R + trunk rotation  -OH      Cueing 2 Verbal;Demo  -OH      Reps 2 10e  -OH      Time 2 30#  -OH      Additional Comments dowel at tuff stuff, cues to keep center of bar in line with COM, maintain slight knee bend  -OH              Exercise 3    Exercise Name 3 single arm shoulder ext in SLS  -OH      Cueing 3 Verbal;Demo  -OH      Sets 3 2  -OH      Reps 3 10  -OH      Time 3 cues for level pelvis, engage glute and core and scap retract  -OH      Additional Comments 20# tuff stuff  -OH              Exercise 4    Exercise Name 4 straight arm pull down  -OH      Cueing 4 Verbal;Demo  -OH      Sets 4 2  -OH      Reps 4 10  -OH      Time 4 30#  -OH              Exercise 5    Exercise Name 5 suitcase carry  -OH      Cueing 5 Verbal  -OH      Reps 5 2 laps each  -OH      Time 5 10# DB  -OH              Exercise 6    Exercise Name 6 modified plank  -OH      Cueing 6 Verbal;Demo  -OH      Reps 6 3  -OH      Time 6 30sec  -OH      Additional Comments hands on BOSU rocking laterally  -OH              Exercise 7    Exercise Name 7 squat (tap at chair)  -OH      Cueing 7 Verbal;Demo  -OH      Sets 7 2  -OH      Reps 7 10  -OH      Time 7 + press out L2  -OH              Exercise 8    Exercise Name 8 stir the pot  -OH      Cueing 8 Verbal;Demo  -OH      Sets 8 2  -OH      Reps 8 10e  -OH      Time 8 CW/CCW  -OH      Additional Comments 20#  -OH              Exercise 14    Exercise Name 14 lateral stepping  -OH      Cueing 14 Verbal;Demo  -OH      Reps 14 4 laps GTB  -OH              Exercise 17    Exercise Name 17 monster walks  -OH      Cueing 17 Verbal;Demo  -OH      Reps 17 4 laps  -OH      Time 17 GTB  -OH            User Key  (r) = Recorded By, (t) = Taken By,  (c) = Cosigned By    Initials Name Provider Type    Giovanna Amaya, PT Physical Therapist                              PT OP Goals     Row Name 05/13/22 1600          PT Short Term Goals    STG Date to Achieve 03/03/22  -     STG 1 Patient independent and compliant with initial HEP focused on core/pelvic stabilization and flexibility techniques for muscle rebalance and decreased pain levels  -     STG 1 Progress Met  [facility]     STG 2 Patient demonstrate proper lift techniques for care of child with dressing, changing diapers, carrying, etc  -     STG 2 Progress Met  [facility]     STG 3 Patient able to perform PPT with proper form to improve TA contraction through functional mobility  -     STG 3 Progress Met  [facility]     STG 4 Patient with improved posture to upright (decreased posterior lean)  -     STG 4 Progress Met  -[facility]            Long Term Goals    LTG Date to Achieve 05/02/22  -     LTG 1 Patient independent and compliant with advanced HEP for transition to self care of condition  -     LTG 1 Progress Ongoing  -[facility]     LTG 2 Patient demonstrate proper ergonomic set up for school related duties for decreased spinal pain  -     LTG 2 Progress Ongoing  [facility]     LTG 3 Patient MMT B hip abductors/ER/IR in SL >/=4+/5 for improved stabilization of core/pelvis  -OH     LTG 3 Progress Met  -[facility]     LTG 4 Patient with improved alignment of pelvic/innominates for decreased symptoms sacral/lumbar region  -     LTG 4 Progress Partially Met  -[facility]           User Key  (r) = Recorded By, (t) = Taken By, (c) = Cosigned By    Initials Name Provider Type    Giovanna Amaya PT Physical Therapist                               Time Calculation:   Start Time: 1616  Stop Time: 1656  Time Calculation (min): 40 min  Timed Charges  55442 - PT Therapeutic Exercise Minutes: 40  Total Minutes  Timed Charges Total Minutes: 40   Total Minutes: 40  Therapy Charges for Today     Code Description Service Date Service  Provider Modifiers Qty    [id number]  PT THER PROC EA 15 MIN 5/13/2022 Giovanna Means, PT GP 3                    Giovanna Means, PT  5/13/2022

## 2022-05-16 ENCOUNTER — APPOINTMENT (OUTPATIENT)
Dept: PHYSICAL THERAPY | Facility: HOSPITAL | Age: 27
End: 2022-05-16

## 2022-05-20 ENCOUNTER — HOSPITAL ENCOUNTER (OUTPATIENT)
Dept: PHYSICAL THERAPY | Facility: HOSPITAL | Age: 27
Setting detail: THERAPIES SERIES
Discharge: HOME | End: 2022-05-20

## 2022-05-20 DIAGNOSIS — M40.50 LORDOSIS: ICD-10-CM

## 2022-05-20 DIAGNOSIS — M54.2 CHRONIC NECK PAIN: ICD-10-CM

## 2022-05-20 DIAGNOSIS — M41.9 SCOLIOSIS OF THORACOLUMBAR SPINE, UNSPECIFIED SCOLIOSIS TYPE: ICD-10-CM

## 2022-05-20 DIAGNOSIS — M54.41 CHRONIC LOW BACK PAIN WITH BILATERAL SCIATICA, UNSPECIFIED BACK PAIN LATERALITY: Primary | ICD-10-CM

## 2022-05-20 DIAGNOSIS — R29.3 ABNORMAL POSTURE: ICD-10-CM

## 2022-05-20 DIAGNOSIS — G89.29 CHRONIC NECK PAIN: ICD-10-CM

## 2022-05-20 DIAGNOSIS — M54.42 CHRONIC LOW BACK PAIN WITH BILATERAL SCIATICA, UNSPECIFIED BACK PAIN LATERALITY: Primary | ICD-10-CM

## 2022-05-20 DIAGNOSIS — G89.29 CHRONIC LOW BACK PAIN WITH BILATERAL SCIATICA, UNSPECIFIED BACK PAIN LATERALITY: Primary | ICD-10-CM

## 2022-05-20 DIAGNOSIS — N62 MACROMASTIA: ICD-10-CM

## 2022-05-20 PROCEDURE — 97110 THERAPEUTIC EXERCISES: CPT

## 2022-05-20 NOTE — THERAPY DISCHARGE NOTE
Outpatient Physical Therapy Ortho Treatment Note/Discharge Summary  [facility]     Patient Name: Kristin Jones  : 1995  MRN: [id number]  Today's Date: 2022      Visit Date: 2022    Visit Dx:    ICD-10-CM ICD-9-CM   1. Chronic low back pain with bilateral sciatica, unspecified back pain laterality  M54.41 724.2    G89.29 724.3    M54.42 338.29   2. Lordosis  M40.50 737.20   3. Chronic neck pain  M54.2 723.1    G89.29 338.29   4. Scoliosis of thoracolumbar spine, unspecified scoliosis type  M41.9 737.30   5. Abnormal posture  R29.3 781.92   6. Macromastia  N62 611.1       Patient Active Problem List   Diagnosis   • Term pregnancy   • Breast lump   • Generalized anxiety disorder   • Lordosis   • Low back pain   • Migraine without aura, not refractory   • Obesity   • Smoker   • Dysmenorrhea        Past Medical History:   Diagnosis Date   • Chlamydia    • Depression    • Low blood sugar    • Spinal headache 20: spinal tap, : from epidural        Past Surgical History:   Procedure Laterality Date   • WISDOM TOOTH EXTRACTION  2015                        PT Assessment/Plan     Row Name 22 1600          PT Assessment    Assessment Comments Kristin Jones has been seen for 11 physical therapy sessions for chronic low back pain with sciatica. Upon arrival patient continues to demonstrates noted improvement in standing posture with mild postural sway. Treatment has included therapeutic exercise and patient education with home exercise program. Progress to physical therapy goals is good. Pt has met 4/4 STG and 3/4 LTG with partially meeting ergonomic set up goal when performing school work due to needing to purchase desk chair. Patient reports overall 65% functional ability in comparison to PLOF with primary complaint involving general BLE weakness. She also reports 75% improvement in ability to  her 30lb, 18month old son. Patient's self-scored  Modified Oswestry improved from 64% on initial evaluation to 24% (where 0% is no disability). Time spent reviewing squat mechanics and proper technique when putting son in/out of rear facing car seat. Also discussed advanced HEP and appropriate progressions/modifications to make based on response following discharge and optimal frequency/duration to complete HEP over next severe weeks-months. Patient verbalized understanding. She was discharged to an independent HEP and provided patient education to self-manage condition.  -OH            PT Plan    PT Plan Comments discharged  -OH           User Key  (r) = Recorded By, (t) = Taken By, (c) = Cosigned By    Initials Name Provider Type    Giovanna Amaya, PT Physical Therapist                     OP Exercises     Row Name 05/20/22 1600             Subjective Comments    Subjective Comments I am ready for discharge  -OH              Total Minutes    21306 - PT Therapeutic Exercise Minutes 30  -OH              Exercise 1    Exercise Name 1 NuStep  -OH      Cueing 1 Verbal;Demo  -OH      Time 1 5 min  -OH              Exercise 16    Exercise Name 16 time spent addressing goals, completing surveys, discussing HEP  -OH              Exercise 19    Exercise Name 19 mechanics for putting son in/out of car seat  -OH      Time 19 5 mins  -OH              Exercise 20    Exercise Name 20 lifting wedge (baby) from floor and carrying to table 5  -OH      Cueing 20 Verbal;Tactile;Demo  -OH      Sets 20 1  -OH      Reps 20 15  -OH            User Key  (r) = Recorded By, (t) = Taken By, (c) = Cosigned By    Initials Name Provider Type    Giovanna Amaya, PT Physical Therapist                                PT OP Goals     Row Name 05/20/22 1600          PT Short Term Goals    STG Date to Achieve 03/03/22  -OH     STG 1 Patient independent and compliant with initial HEP focused on core/pelvic stabilization and flexibility techniques for muscle rebalance and  decreased pain levels  -     STG 1 Progress Met  -     STG 2 Patient demonstrate proper lift techniques for care of child with dressing, changing diapers, carrying, etc  -     STG 2 Progress Met  -     STG 3 Patient able to perform PPT with proper form to improve TA contraction through functional mobility  -     STG 3 Progress Met  -     STG 4 Patient with improved posture to upright (decreased posterior lean)  -[facility] 4 Progress Met  -            Long Term Goals    LTG Date to Achieve 05/02/22  -     LTG 1 Patient independent and compliant with advanced HEP for transition to self care of condition  -     LTG 1 Progress Met  -     LTG 2 Patient demonstrate proper ergonomic set up for school related duties for decreased spinal pain  -     LTG 2 Progress Partially Met  -     LTG 2 Progress Comments planning to get desk chair  -     LTG 3 Patient MMT B hip abductors/ER/IR in SL >/=4+/5 for improved stabilization of core/pelvis  -     LTG 3 Progress Met  -     LTG 4 Patient with improved alignment of pelvic/innominates for decreased symptoms sacral/lumbar region  -     LTG 4 Progress Met  -           User Key  (r) = Recorded By, (t) = Taken By, (c) = Cosigned By    Initials Name Provider Type    Giovanna Amaya, PT Physical Therapist                     Outcome Measure Options: Modified Oswestry  Modified Oswestry  Modified Oswestry Score/Comments: 24%      Time Calculation:   Start Time: 1617  Stop Time: 1647  Time Calculation (min): 30 min  Timed Charges  90511 - PT Therapeutic Exercise Minutes: 30  Total Minutes  Timed Charges Total Minutes: 30   Total Minutes: 30  Therapy Charges for Today     Code Description Service Date Service Provider Modifiers Qty    17457663976  PT THER PROC EA 15 MIN 5/20/2022 Giovanna Means, VELMA GP 2          PT G-Codes  Outcome Measure Options: Modified Oswestry  Modified Oswestry Score/Comments: 24%     OP PT Discharge  Summary  Date of Discharge: 05/20/22  Reason for Discharge: Maximum functional potential achieved  Outcomes Achieved: Patient able to partially acheive established goals  Discharge Destination: Home with home program      Giovanna Means, PT  5/20/2022

## 2022-05-27 ENCOUNTER — APPOINTMENT (OUTPATIENT)
Dept: PHYSICAL THERAPY | Facility: HOSPITAL | Age: 27
End: 2022-05-27

## 2022-06-14 ENCOUNTER — OFFICE VISIT (OUTPATIENT)
Dept: OBSTETRICS AND GYNECOLOGY | Facility: CLINIC | Age: 27
End: 2022-06-14

## 2022-06-14 VITALS
DIASTOLIC BLOOD PRESSURE: 75 MMHG | BODY MASS INDEX: 24.96 KG/M2 | HEART RATE: 92 BPM | HEIGHT: 67 IN | SYSTOLIC BLOOD PRESSURE: 114 MMHG | WEIGHT: 159 LBS

## 2022-06-14 DIAGNOSIS — N93.9 ABNORMAL UTERINE BLEEDING (AUB): Primary | ICD-10-CM

## 2022-06-14 DIAGNOSIS — Z11.3 SCREENING FOR STD (SEXUALLY TRANSMITTED DISEASE): ICD-10-CM

## 2022-06-14 LAB
B-HCG UR QL: NEGATIVE
BILIRUB BLD-MCNC: NEGATIVE MG/DL
CLARITY, POC: ABNORMAL
COLOR UR: YELLOW
EXPIRATION DATE: NORMAL
GLUCOSE UR STRIP-MCNC: NEGATIVE MG/DL
INTERNAL NEGATIVE CONTROL: NEGATIVE
INTERNAL POSITIVE CONTROL: POSITIVE
KETONES UR QL: NEGATIVE
LEUKOCYTE EST, POC: NEGATIVE
Lab: NORMAL
NITRITE UR-MCNC: NEGATIVE MG/ML
PH UR: 5.5 [PH] (ref 5–8)
PROT UR STRIP-MCNC: NEGATIVE MG/DL
RBC # UR STRIP: ABNORMAL /UL
SP GR UR: 1.03 (ref 1–1.03)
UROBILINOGEN UR QL: NORMAL

## 2022-06-14 PROCEDURE — 99213 OFFICE O/P EST LOW 20 MIN: CPT | Performed by: OBSTETRICS & GYNECOLOGY

## 2022-06-14 PROCEDURE — 81025 URINE PREGNANCY TEST: CPT | Performed by: OBSTETRICS & GYNECOLOGY

## 2022-06-14 NOTE — PROGRESS NOTES
SUBJECTIVE:   Chief Complaint   Patient presents with   • Abnormal bleeding        Kristin Jones is a 26 y.o.  who presents for Abnormal bleeding x 10 days. Had normal period at the end of May (-- a little lighter than usual), then no bleeding until , then bled until today.  It is getting much lighter but was heavy this past week.  She denies any cramping or pain.  No fever or chills; she is sexually active with her boyfriend. They were using a condom and 6/4 the condom slipped off at the end.      No vaginal discharge.  All other periods are normal. Using condoms all the time for contraception. No breast pain or lump; still having some lactation - stopped breastfeeding five months after delivery.         Past Medical History:   Diagnosis Date   • Chlamydia    • Depression    • Low blood sugar    • Spinal headache 20: spinal tap, : from epidural     Past Surgical History:   Procedure Laterality Date   • WISDOM TOOTH EXTRACTION  2015     OB History    Para Term  AB Living   1 1 1     1   SAB IAB Ectopic Molar Multiple Live Births             1      # Outcome Date GA Lbr Ananda/2nd Weight Sex Delivery Anes PTL Lv   1 Term 20 39w6d 06:20  00:48 3711 g (8 lb 2.9 oz) M Vag-Spont Spinal N CORBIN      Birth Comments: Infant Scale 2      Social History     Tobacco Use   • Smoking status: Light Tobacco Smoker     Packs/day: 0.50     Years: 10.00     Pack years: 5.00     Types: Cigarettes   • Smokeless tobacco: Never Used   Substance Use Topics   • Alcohol use: Yes     Comment: rarely   • Drug use: Yes     Types: Marijuana     Comment: cutting down.  denies use.     Family History   Problem Relation Age of Onset   • Breast cancer Mother 40        survivor   • Cervical cancer Sister         40's   • Colon cancer Paternal Grandmother         80's   • Melanoma Maternal Uncle    • Prostate cancer Paternal Uncle    • Diabetes Maternal Aunt    •  "Hypertension Father    • Liver cancer Father    • Lung cancer Father    • Bone cancer Father    • Cancer Maternal Grandmother         [place]   • Lung cancer Maternal Grandmother         double   • Ovarian cancer Neg Hx    • Uterine cancer Neg Hx    • Pulmonary embolism Neg Hx    • Deep vein thrombosis Neg Hx      Current Outpatient Medications on File Prior to Visit   Medication Sig Dispense Refill   • cholecalciferol (VITAMIN D3) 25 MCG (1000 UT) tablet      • cyclobenzaprine (FLEXERIL) 5 MG tablet        No current facility-administered medications on file prior to visit.     Allergies   Allergen Reactions   • Doxycycline GI Intolerance   • Kiwi Extract Other (See Comments)     Oral swelling   • Sulfamethoxazole-Trimethoprim GI Intolerance   • Nickel Dermatitis, Itching and Rash        Review of Systems      OBJECTIVE:   Vitals:    06/14/22 0918   BP: 114/75   Pulse: 92   Weight: 72.1 kg (159 lb)   Height: 170.2 cm (67.01\")      Physical Exam  Exam conducted with a chaperone present.   Constitutional:       Appearance: She is well-developed.   HENT:      Head: Normocephalic and atraumatic.   Eyes:      General: No scleral icterus.  Cardiovascular:      Rate and Rhythm: Normal rate.   Pulmonary:      Effort: Pulmonary effort is normal. No respiratory distress.   Abdominal:      General: There is no distension.      Palpations: Abdomen is soft.      Tenderness: There is no abdominal tenderness. There is no guarding.   Genitourinary:     Labia:         Right: No rash, tenderness or lesion.         Left: No rash, tenderness or lesion.       Vagina: Vaginal discharge (scant, dark brown) present. No bleeding or lesions.      Cervix: No cervical motion tenderness or friability.      Uterus: Normal. Not enlarged and not tender.       Adnexa: Right adnexa normal.        Right: No mass or tenderness.          Left: No mass or tenderness.     Musculoskeletal:      Cervical back: Normal range of motion.   Skin:     General: " Skin is warm and dry.   Neurological:      Mental Status: She is alert and oriented to person, place, and time.   Psychiatric:         Behavior: Behavior normal.         ASSESSMENT/PLAN:     ICD-10-CM ICD-9-CM   1. Abnormal uterine bleeding (AUB)  N93.9 626.9   2. Screening for STD (sexually transmitted disease)  Z11.3 V74.5       Reviewed possible causes of AUB  Bleeding is improving so we will get labs and await results   Recommend follow up with possible SIS if this is recurrent  Pt agrees    Desires STI testing, agrees to TSH test.   Orders Placed This Encounter   Procedures   • NuSwab VG+ - , Vagina     Order Specific Question:   Release to patient     Answer:   Immediate   • HIV-1 / O / 2 Ag / Antibody 4th Generation     Order Specific Question:   Release to patient     Answer:   Immediate   • RPR     Order Specific Question:   Release to patient     Answer:   Immediate   • Hepatitis C Antibody     Order Specific Question:   Release to patient     Answer:   Immediate   • TSH Rfx On Abnormal To Free T4     Order Specific Question:   Release to patient     Answer:   Immediate   • POC Urinalysis Dipstick     Order Specific Question:   Release to patient     Answer:   Immediate   • POC Pregnancy, Urine     Order Specific Question:   Release to patient     Answer:   Immediate       No follow-ups on file.

## 2022-06-15 LAB
HCV AB S/CO SERPL IA: 0.1 S/CO RATIO (ref 0–0.9)
HIV 1+2 AB+HIV1 P24 AG SERPL QL IA: NON REACTIVE
RPR SER QL: NON REACTIVE
TSH SERPL DL<=0.005 MIU/L-ACNC: 0.71 UIU/ML (ref 0.45–4.5)

## 2022-06-16 LAB
A VAGINAE DNA VAG QL NAA+PROBE: NORMAL SCORE
BVAB2 DNA VAG QL NAA+PROBE: NORMAL SCORE
C ALBICANS DNA VAG QL NAA+PROBE: NEGATIVE
C GLABRATA DNA VAG QL NAA+PROBE: NEGATIVE
C TRACH DNA VAG QL NAA+PROBE: NEGATIVE
MEGA1 DNA VAG QL NAA+PROBE: NORMAL SCORE
N GONORRHOEA DNA VAG QL NAA+PROBE: NEGATIVE
T VAGINALIS DNA VAG QL NAA+PROBE: NEGATIVE

## 2022-06-19 ENCOUNTER — PATIENT MESSAGE (OUTPATIENT)
Dept: OBSTETRICS AND GYNECOLOGY | Facility: CLINIC | Age: 27
End: 2022-06-19

## 2022-06-19 DIAGNOSIS — N93.9 ABNORMAL UTERINE BLEEDING (AUB): Primary | ICD-10-CM

## 2022-06-20 NOTE — TELEPHONE ENCOUNTER
My Chart. Seen  6/14/22 for AUB. All tests normal. She started bleeding again 6/18/22. [facility] [employer] on file. Does she need another appt and US? Please advise. Thank you.

## 2022-06-26 ENCOUNTER — TELEPHONE (OUTPATIENT)
Dept: OBSTETRICS AND GYNECOLOGY | Facility: CLINIC | Age: 27
End: 2022-06-26

## 2022-06-26 NOTE — TELEPHONE ENCOUNTER
Please let patient know that her ultrasound appeared normal. No fibroids or polyps.  With continued bleeding, we would recommend an appointment to discuss treatment.

## 2022-07-20 ENCOUNTER — TRANSCRIBE ORDERS (OUTPATIENT)
Dept: ADMINISTRATIVE | Facility: HOSPITAL | Age: 27
End: 2022-07-20

## 2022-07-20 DIAGNOSIS — Z01.818 OTHER SPECIFIED PRE-OPERATIVE EXAMINATION: Primary | ICD-10-CM

## 2022-07-27 ENCOUNTER — LAB (OUTPATIENT)
Dept: LAB | Facility: HOSPITAL | Age: 27
End: 2022-07-27

## 2022-07-27 DIAGNOSIS — Z01.818 OTHER SPECIFIED PRE-OPERATIVE EXAMINATION: ICD-10-CM

## 2022-07-27 LAB — SARS-COV-2 ORF1AB RESP QL NAA+PROBE: NOT DETECTED

## 2022-07-27 PROCEDURE — U0004 COV-19 TEST NON-CDC HGH THRU: HCPCS

## 2022-07-27 PROCEDURE — C9803 HOPD COVID-19 SPEC COLLECT: HCPCS

## 2022-07-28 PROBLEM — G56.01 CARPAL TUNNEL SYNDROME OF RIGHT WRIST: Status: ACTIVE | Noted: 2022-07-28

## 2022-07-29 ENCOUNTER — ANESTHESIA (OUTPATIENT)
Dept: PERIOP | Facility: HOSPITAL | Age: 27
End: 2022-07-29

## 2022-07-29 ENCOUNTER — ANESTHESIA EVENT (OUTPATIENT)
Dept: PERIOP | Facility: HOSPITAL | Age: 27
End: 2022-07-29

## 2022-07-29 ENCOUNTER — HOSPITAL ENCOUNTER (OUTPATIENT)
Facility: HOSPITAL | Age: 27
Setting detail: HOSPITAL OUTPATIENT SURGERY
Discharge: HOME | End: 2022-07-29
Attending: STUDENT IN AN ORGANIZED HEALTH CARE EDUCATION/TRAINING PROGRAM | Admitting: STUDENT IN AN ORGANIZED HEALTH CARE EDUCATION/TRAINING PROGRAM

## 2022-07-29 VITALS
HEIGHT: 67 IN | DIASTOLIC BLOOD PRESSURE: 80 MMHG | WEIGHT: 156 LBS | RESPIRATION RATE: 16 BRPM | BODY MASS INDEX: 24.48 KG/M2 | TEMPERATURE: 98.6 F | SYSTOLIC BLOOD PRESSURE: 114 MMHG | HEART RATE: 63 BPM | OXYGEN SATURATION: 100 %

## 2022-07-29 DIAGNOSIS — G56.01 CARPAL TUNNEL SYNDROME OF RIGHT WRIST: Primary | ICD-10-CM

## 2022-07-29 LAB
B-HCG UR QL: NEGATIVE
EXPIRATION DATE: NORMAL
INTERNAL NEGATIVE CONTROL: NEGATIVE
INTERNAL POSITIVE CONTROL: POSITIVE
Lab: NORMAL

## 2022-07-29 PROCEDURE — 25010000002 MIDAZOLAM PER 1 MG: Performed by: ANESTHESIOLOGY

## 2022-07-29 PROCEDURE — 81025 URINE PREGNANCY TEST: CPT | Performed by: STUDENT IN AN ORGANIZED HEALTH CARE EDUCATION/TRAINING PROGRAM

## 2022-07-29 PROCEDURE — 25010000002 ONDANSETRON PER 1 MG: Performed by: NURSE ANESTHETIST, CERTIFIED REGISTERED

## 2022-07-29 PROCEDURE — 25010000002 PROPOFOL 10 MG/ML EMULSION: Performed by: NURSE ANESTHETIST, CERTIFIED REGISTERED

## 2022-07-29 PROCEDURE — 25010000002 FENTANYL CITRATE (PF) 50 MCG/ML SOLUTION: Performed by: NURSE ANESTHETIST, CERTIFIED REGISTERED

## 2022-07-29 RX ORDER — HYDROMORPHONE HYDROCHLORIDE 1 MG/ML
0.5 INJECTION, SOLUTION INTRAMUSCULAR; INTRAVENOUS; SUBCUTANEOUS
Status: DISCONTINUED | OUTPATIENT
Start: 2022-07-29 | End: 2022-07-29 | Stop reason: HOSPADM

## 2022-07-29 RX ORDER — MIDAZOLAM HYDROCHLORIDE 1 MG/ML
1 INJECTION INTRAMUSCULAR; INTRAVENOUS
Status: COMPLETED | OUTPATIENT
Start: 2022-07-29 | End: 2022-07-29

## 2022-07-29 RX ORDER — TRAMADOL HYDROCHLORIDE 50 MG/1
50 TABLET ORAL EVERY 6 HOURS PRN
Qty: 8 TABLET | Refills: 0 | Status: SHIPPED | OUTPATIENT
Start: 2022-07-29 | End: 2022-08-25

## 2022-07-29 RX ORDER — EPHEDRINE SULFATE 50 MG/ML
5 INJECTION, SOLUTION INTRAVENOUS ONCE AS NEEDED
Status: DISCONTINUED | OUTPATIENT
Start: 2022-07-29 | End: 2022-07-29 | Stop reason: HOSPADM

## 2022-07-29 RX ORDER — DIPHENHYDRAMINE HCL 25 MG
25 CAPSULE ORAL
Status: DISCONTINUED | OUTPATIENT
Start: 2022-07-29 | End: 2022-07-29 | Stop reason: HOSPADM

## 2022-07-29 RX ORDER — SODIUM CHLORIDE 0.9 % (FLUSH) 0.9 %
10 SYRINGE (ML) INJECTION EVERY 12 HOURS SCHEDULED
Status: DISCONTINUED | OUTPATIENT
Start: 2022-07-29 | End: 2022-07-29 | Stop reason: HOSPADM

## 2022-07-29 RX ORDER — OXYCODONE AND ACETAMINOPHEN 7.5; 325 MG/1; MG/1
1 TABLET ORAL EVERY 4 HOURS PRN
Status: DISCONTINUED | OUTPATIENT
Start: 2022-07-29 | End: 2022-07-29 | Stop reason: HOSPADM

## 2022-07-29 RX ORDER — PROPOFOL 10 MG/ML
VIAL (ML) INTRAVENOUS AS NEEDED
Status: DISCONTINUED | OUTPATIENT
Start: 2022-07-29 | End: 2022-07-29 | Stop reason: SURG

## 2022-07-29 RX ORDER — HYDROCODONE BITARTRATE AND ACETAMINOPHEN 7.5; 325 MG/1; MG/1
1 TABLET ORAL ONCE AS NEEDED
Status: DISCONTINUED | OUTPATIENT
Start: 2022-07-29 | End: 2022-07-29 | Stop reason: HOSPADM

## 2022-07-29 RX ORDER — ACETAMINOPHEN 325 MG/1
650 TABLET ORAL ONCE AS NEEDED
Status: DISCONTINUED | OUTPATIENT
Start: 2022-07-29 | End: 2022-07-29 | Stop reason: HOSPADM

## 2022-07-29 RX ORDER — PROMETHAZINE HYDROCHLORIDE 25 MG/1
25 SUPPOSITORY RECTAL ONCE AS NEEDED
Status: DISCONTINUED | OUTPATIENT
Start: 2022-07-29 | End: 2022-07-29 | Stop reason: HOSPADM

## 2022-07-29 RX ORDER — LIDOCAINE HYDROCHLORIDE AND EPINEPHRINE 10; 10 MG/ML; UG/ML
INJECTION, SOLUTION INFILTRATION; PERINEURAL AS NEEDED
Status: DISCONTINUED | OUTPATIENT
Start: 2022-07-29 | End: 2022-07-29 | Stop reason: HOSPADM

## 2022-07-29 RX ORDER — DEXMEDETOMIDINE HYDROCHLORIDE 100 UG/ML
INJECTION, SOLUTION INTRAVENOUS AS NEEDED
Status: DISCONTINUED | OUTPATIENT
Start: 2022-07-29 | End: 2022-07-29 | Stop reason: SURG

## 2022-07-29 RX ORDER — LIDOCAINE HYDROCHLORIDE 20 MG/ML
INJECTION, SOLUTION INFILTRATION; PERINEURAL AS NEEDED
Status: DISCONTINUED | OUTPATIENT
Start: 2022-07-29 | End: 2022-07-29 | Stop reason: SURG

## 2022-07-29 RX ORDER — ACETAMINOPHEN 500 MG
500 TABLET ORAL ONCE
Status: COMPLETED | OUTPATIENT
Start: 2022-07-29 | End: 2022-07-29

## 2022-07-29 RX ORDER — FENTANYL CITRATE 50 UG/ML
50 INJECTION, SOLUTION INTRAMUSCULAR; INTRAVENOUS
Status: DISCONTINUED | OUTPATIENT
Start: 2022-07-29 | End: 2022-07-29 | Stop reason: HOSPADM

## 2022-07-29 RX ORDER — FLUMAZENIL 0.1 MG/ML
0.2 INJECTION INTRAVENOUS AS NEEDED
Status: DISCONTINUED | OUTPATIENT
Start: 2022-07-29 | End: 2022-07-29 | Stop reason: HOSPADM

## 2022-07-29 RX ORDER — DIPHENHYDRAMINE HYDROCHLORIDE 50 MG/ML
12.5 INJECTION INTRAMUSCULAR; INTRAVENOUS
Status: DISCONTINUED | OUTPATIENT
Start: 2022-07-29 | End: 2022-07-29 | Stop reason: HOSPADM

## 2022-07-29 RX ORDER — SODIUM CHLORIDE 0.9 % (FLUSH) 0.9 %
10 SYRINGE (ML) INJECTION AS NEEDED
Status: DISCONTINUED | OUTPATIENT
Start: 2022-07-29 | End: 2022-07-29 | Stop reason: HOSPADM

## 2022-07-29 RX ORDER — ONDANSETRON 2 MG/ML
4 INJECTION INTRAMUSCULAR; INTRAVENOUS ONCE AS NEEDED
Status: DISCONTINUED | OUTPATIENT
Start: 2022-07-29 | End: 2022-07-29 | Stop reason: HOSPADM

## 2022-07-29 RX ORDER — LIDOCAINE HYDROCHLORIDE 10 MG/ML
0.5 INJECTION, SOLUTION EPIDURAL; INFILTRATION; INTRACAUDAL; PERINEURAL ONCE AS NEEDED
Status: DISCONTINUED | OUTPATIENT
Start: 2022-07-29 | End: 2022-07-29 | Stop reason: HOSPADM

## 2022-07-29 RX ORDER — HYDRALAZINE HYDROCHLORIDE 20 MG/ML
5 INJECTION INTRAMUSCULAR; INTRAVENOUS
Status: DISCONTINUED | OUTPATIENT
Start: 2022-07-29 | End: 2022-07-29 | Stop reason: HOSPADM

## 2022-07-29 RX ORDER — ONDANSETRON 2 MG/ML
INJECTION INTRAMUSCULAR; INTRAVENOUS AS NEEDED
Status: DISCONTINUED | OUTPATIENT
Start: 2022-07-29 | End: 2022-07-29 | Stop reason: SURG

## 2022-07-29 RX ORDER — PROPOFOL 10 MG/ML
VIAL (ML) INTRAVENOUS CONTINUOUS PRN
Status: DISCONTINUED | OUTPATIENT
Start: 2022-07-29 | End: 2022-07-29 | Stop reason: SURG

## 2022-07-29 RX ORDER — NALOXONE HCL 0.4 MG/ML
0.2 VIAL (ML) INJECTION AS NEEDED
Status: DISCONTINUED | OUTPATIENT
Start: 2022-07-29 | End: 2022-07-29 | Stop reason: HOSPADM

## 2022-07-29 RX ORDER — SODIUM CHLORIDE, SODIUM LACTATE, POTASSIUM CHLORIDE, CALCIUM CHLORIDE 600; 310; 30; 20 MG/100ML; MG/100ML; MG/100ML; MG/100ML
9 INJECTION, SOLUTION INTRAVENOUS CONTINUOUS
Status: DISCONTINUED | OUTPATIENT
Start: 2022-07-29 | End: 2022-07-29 | Stop reason: HOSPADM

## 2022-07-29 RX ORDER — LABETALOL HYDROCHLORIDE 5 MG/ML
5 INJECTION, SOLUTION INTRAVENOUS
Status: DISCONTINUED | OUTPATIENT
Start: 2022-07-29 | End: 2022-07-29 | Stop reason: HOSPADM

## 2022-07-29 RX ORDER — GLYCOPYRROLATE 0.2 MG/ML
INJECTION INTRAMUSCULAR; INTRAVENOUS AS NEEDED
Status: DISCONTINUED | OUTPATIENT
Start: 2022-07-29 | End: 2022-07-29 | Stop reason: SURG

## 2022-07-29 RX ORDER — ACETAMINOPHEN 650 MG/1
650 SUPPOSITORY RECTAL ONCE AS NEEDED
Status: DISCONTINUED | OUTPATIENT
Start: 2022-07-29 | End: 2022-07-29 | Stop reason: HOSPADM

## 2022-07-29 RX ORDER — PROMETHAZINE HYDROCHLORIDE 25 MG/1
25 TABLET ORAL ONCE AS NEEDED
Status: DISCONTINUED | OUTPATIENT
Start: 2022-07-29 | End: 2022-07-29 | Stop reason: HOSPADM

## 2022-07-29 RX ORDER — MAGNESIUM HYDROXIDE 1200 MG/15ML
LIQUID ORAL AS NEEDED
Status: DISCONTINUED | OUTPATIENT
Start: 2022-07-29 | End: 2022-07-29 | Stop reason: HOSPADM

## 2022-07-29 RX ORDER — FENTANYL CITRATE 50 UG/ML
INJECTION, SOLUTION INTRAMUSCULAR; INTRAVENOUS AS NEEDED
Status: DISCONTINUED | OUTPATIENT
Start: 2022-07-29 | End: 2022-07-29 | Stop reason: SURG

## 2022-07-29 RX ADMIN — SODIUM CHLORIDE, POTASSIUM CHLORIDE, SODIUM LACTATE AND CALCIUM CHLORIDE: 600; 310; 30; 20 INJECTION, SOLUTION INTRAVENOUS at 06:52

## 2022-07-29 RX ADMIN — GLYCOPYRROLATE 0.2 MG: 0.2 INJECTION INTRAMUSCULAR; INTRAVENOUS at 07:00

## 2022-07-29 RX ADMIN — LIDOCAINE HYDROCHLORIDE 80 MG: 20 INJECTION, SOLUTION INFILTRATION; PERINEURAL at 06:56

## 2022-07-29 RX ADMIN — MIDAZOLAM 1 MG: 1 INJECTION INTRAMUSCULAR; INTRAVENOUS at 06:00

## 2022-07-29 RX ADMIN — FENTANYL CITRATE 50 MCG: 50 INJECTION INTRAMUSCULAR; INTRAVENOUS at 06:54

## 2022-07-29 RX ADMIN — DEXMEDETOMIDINE 8 MCG: 100 INJECTION, SOLUTION, CONCENTRATE INTRAVENOUS at 06:54

## 2022-07-29 RX ADMIN — FENTANYL CITRATE 50 MCG: 50 INJECTION INTRAMUSCULAR; INTRAVENOUS at 07:24

## 2022-07-29 RX ADMIN — ACETAMINOPHEN 500 MG: 500 TABLET ORAL at 05:46

## 2022-07-29 RX ADMIN — PROPOFOL 20 MG: 10 INJECTION, EMULSION INTRAVENOUS at 07:16

## 2022-07-29 RX ADMIN — DEXMEDETOMIDINE 8 MCG: 100 INJECTION, SOLUTION, CONCENTRATE INTRAVENOUS at 07:15

## 2022-07-29 RX ADMIN — PROPOFOL 50 MG: 10 INJECTION, EMULSION INTRAVENOUS at 06:57

## 2022-07-29 RX ADMIN — MIDAZOLAM 1 MG: 1 INJECTION INTRAMUSCULAR; INTRAVENOUS at 05:50

## 2022-07-29 RX ADMIN — PROPOFOL 30 MG: 10 INJECTION, EMULSION INTRAVENOUS at 07:06

## 2022-07-29 RX ADMIN — PROPOFOL 80 MG: 10 INJECTION, EMULSION INTRAVENOUS at 07:20

## 2022-07-29 RX ADMIN — DEXMEDETOMIDINE 8 MCG: 100 INJECTION, SOLUTION, CONCENTRATE INTRAVENOUS at 06:59

## 2022-07-29 RX ADMIN — Medication 75 MCG/KG/MIN: at 06:57

## 2022-07-29 RX ADMIN — DEXMEDETOMIDINE 8 MCG: 100 INJECTION, SOLUTION, CONCENTRATE INTRAVENOUS at 07:06

## 2022-07-29 RX ADMIN — ONDANSETRON 4 MG: 2 INJECTION INTRAMUSCULAR; INTRAVENOUS at 07:18

## 2022-07-29 RX ADMIN — LIDOCAINE HYDROCHLORIDE,EPINEPHRINE BITARTRATE 10 ML: 10; .01 INJECTION, SOLUTION INFILTRATION; PERINEURAL at 06:06

## 2022-07-29 RX ADMIN — PROPOFOL 40 MG: 10 INJECTION, EMULSION INTRAVENOUS at 07:04

## 2022-07-29 NOTE — ANESTHESIA PREPROCEDURE EVALUATION
Anesthesia Evaluation     history of anesthetic complications:  NPO Solid Status: > 8 hours             Airway   Mallampati: II  TM distance: >3 FB  Neck ROM: full  No difficulty expected  Dental - normal exam     Pulmonary    (+) a smoker Current,   (-) wheezes  Cardiovascular     Rhythm: regular        Neuro/Psych  (+) headaches, psychiatric history Anxiety,    GI/Hepatic/Renal/Endo      Musculoskeletal     Abdominal    Substance History      OB/GYN          Other                      Anesthesia Plan    ASA 2     MAC     (Local per surgeon w MAC for anxiety)  intravenous induction     Anesthetic plan, risks, benefits, and alternatives have been provided, discussed and informed consent has been obtained with: patient.        CODE STATUS:

## 2022-07-29 NOTE — ANESTHESIA POSTPROCEDURE EVALUATION
"Patient: Kristin Jones    Procedure Summary     Date: 07/29/22 Room / Location:  [facility] [address] [place] [address] [facility]    Anesthesia Start: 0650 Anesthesia Stop: 0747    Procedure: RIGHT CARPAL TUNNEL RELEASE (Right Wrist) Diagnosis:     Surgeons: Damon Blackwood MD Provider: Larry, Kameron Hill MD    Anesthesia Type: MAC ASA Status: 2          Anesthesia Type: MAC    Vitals  Vitals Value Taken Time   /71 07/29/22 0830   Temp 37 °C (98.6 °F) 07/29/22 0830   Pulse 63 07/29/22 0839   Resp 18 07/29/22 0830   SpO2 100 % 07/29/22 0839   Vitals shown include unvalidated device data.        Post Anesthesia Care and Evaluation    Patient location during evaluation: bedside  Patient participation: complete - patient participated  Level of consciousness: awake and alert  Pain management: adequate    Airway patency: patent  Anesthetic complications: No anesthetic complications    Cardiovascular status: acceptable  Respiratory status: acceptable  Hydration status: acceptable    Comments: /80 (BP Location: Left arm, Patient Position: Sitting)   Pulse 63   Temp 37 °C (98.6 °F) (Temporal)   Resp 16   Ht 170.2 cm (67\")   Wt 70.8 kg (156 lb)   SpO2 100%   BMI 24.43 kg/m²       "

## 2022-08-25 ENCOUNTER — PRE-ADMISSION TESTING (OUTPATIENT)
Dept: PREADMISSION TESTING | Facility: HOSPITAL | Age: 27
End: 2022-08-25

## 2022-08-25 ENCOUNTER — HOSPITAL ENCOUNTER (OUTPATIENT)
Dept: GENERAL RADIOLOGY | Facility: HOSPITAL | Age: 27
Discharge: HOME | End: 2022-08-25

## 2022-08-25 VITALS
SYSTOLIC BLOOD PRESSURE: 108 MMHG | DIASTOLIC BLOOD PRESSURE: 68 MMHG | WEIGHT: 156 LBS | HEIGHT: 67 IN | BODY MASS INDEX: 24.48 KG/M2 | HEART RATE: 77 BPM | RESPIRATION RATE: 18 BRPM | TEMPERATURE: 98.4 F | OXYGEN SATURATION: 99 %

## 2022-08-25 LAB
ALBUMIN SERPL-MCNC: 4.7 G/DL (ref 3.5–5.2)
ALBUMIN/GLOB SERPL: 2.5 G/DL
ALP SERPL-CCNC: 58 U/L (ref 39–117)
ALT SERPL W P-5'-P-CCNC: 12 U/L (ref 1–33)
ANION GAP SERPL CALCULATED.3IONS-SCNC: 11 MMOL/L (ref 5–15)
AST SERPL-CCNC: 15 U/L (ref 1–32)
BACTERIA UR QL AUTO: ABNORMAL /HPF
BILIRUB SERPL-MCNC: 0.5 MG/DL (ref 0–1.2)
BILIRUB UR QL STRIP: NEGATIVE
BUN SERPL-MCNC: 9 MG/DL (ref 6–20)
BUN/CREAT SERPL: 13 (ref 7–25)
CALCIUM SPEC-SCNC: 9.1 MG/DL (ref 8.6–10.5)
CHLORIDE SERPL-SCNC: 105 MMOL/L (ref 98–107)
CLARITY UR: CLEAR
CO2 SERPL-SCNC: 23 MMOL/L (ref 22–29)
COLOR UR: YELLOW
CREAT SERPL-MCNC: 0.69 MG/DL (ref 0.57–1)
DEPRECATED RDW RBC AUTO: 40.5 FL (ref 37–54)
EGFRCR SERPLBLD CKD-EPI 2021: 122.9 ML/MIN/1.73
ERYTHROCYTE [DISTWIDTH] IN BLOOD BY AUTOMATED COUNT: 13 % (ref 12.3–15.4)
GLOBULIN UR ELPH-MCNC: 1.9 GM/DL
GLUCOSE SERPL-MCNC: 104 MG/DL (ref 65–99)
GLUCOSE UR STRIP-MCNC: NEGATIVE MG/DL
HCG SERPL QL: NEGATIVE
HCT VFR BLD AUTO: 42.1 % (ref 34–46.6)
HGB BLD-MCNC: 13.9 G/DL (ref 12–15.9)
HGB UR QL STRIP.AUTO: NEGATIVE
HYALINE CASTS UR QL AUTO: ABNORMAL /LPF
KETONES UR QL STRIP: NEGATIVE
LEUKOCYTE ESTERASE UR QL STRIP.AUTO: ABNORMAL
MCH RBC QN AUTO: 28.4 PG (ref 26.6–33)
MCHC RBC AUTO-ENTMCNC: 33 G/DL (ref 31.5–35.7)
MCV RBC AUTO: 86.1 FL (ref 79–97)
NITRITE UR QL STRIP: NEGATIVE
PH UR STRIP.AUTO: 6 [PH] (ref 5–8)
PLATELET # BLD AUTO: 235 10*3/MM3 (ref 140–450)
PMV BLD AUTO: 10.2 FL (ref 6–12)
POTASSIUM SERPL-SCNC: 3.8 MMOL/L (ref 3.5–5.2)
PROT SERPL-MCNC: 6.6 G/DL (ref 6–8.5)
PROT UR QL STRIP: NEGATIVE
QT INTERVAL: 371 MS
RBC # BLD AUTO: 4.89 10*6/MM3 (ref 3.77–5.28)
RBC # UR STRIP: ABNORMAL /HPF
REF LAB TEST METHOD: ABNORMAL
SARS-COV-2 RNA RESP QL NAA+PROBE: NOT DETECTED
SODIUM SERPL-SCNC: 139 MMOL/L (ref 136–145)
SP GR UR STRIP: 1.01 (ref 1–1.03)
SQUAMOUS #/AREA URNS HPF: ABNORMAL /HPF
UROBILINOGEN UR QL STRIP: ABNORMAL
WBC # UR STRIP: ABNORMAL /HPF
WBC NRBC COR # BLD: 7.88 10*3/MM3 (ref 3.4–10.8)

## 2022-08-25 PROCEDURE — U0003 INFECTIOUS AGENT DETECTION BY NUCLEIC ACID (DNA OR RNA); SEVERE ACUTE RESPIRATORY SYNDROME CORONAVIRUS 2 (SARS-COV-2) (CORONAVIRUS DISEASE [COVID-19]), AMPLIFIED PROBE TECHNIQUE, MAKING USE OF HIGH THROUGHPUT TECHNOLOGIES AS DESCRIBED BY CMS-2020-01-R: HCPCS

## 2022-08-25 PROCEDURE — 71046 X-RAY EXAM CHEST 2 VIEWS: CPT

## 2022-08-25 PROCEDURE — 85027 COMPLETE CBC AUTOMATED: CPT

## 2022-08-25 PROCEDURE — 84703 CHORIONIC GONADOTROPIN ASSAY: CPT

## 2022-08-25 PROCEDURE — 80053 COMPREHEN METABOLIC PANEL: CPT

## 2022-08-25 PROCEDURE — 81001 URINALYSIS AUTO W/SCOPE: CPT

## 2022-08-25 PROCEDURE — 93010 ELECTROCARDIOGRAM REPORT: CPT | Performed by: INTERNAL MEDICINE

## 2022-08-25 PROCEDURE — 36415 COLL VENOUS BLD VENIPUNCTURE: CPT

## 2022-08-25 PROCEDURE — C9803 HOPD COVID-19 SPEC COLLECT: HCPCS

## 2022-08-25 PROCEDURE — 93005 ELECTROCARDIOGRAM TRACING: CPT

## 2022-08-25 RX ORDER — GABAPENTIN 300 MG/1
300 CAPSULE ORAL
COMMUNITY
Start: 2022-08-22 | End: 2023-01-20

## 2022-08-25 NOTE — DISCHARGE INSTRUCTIONS
Orthopaedic & Hand Surgery  Discharge Instructions  Dr. Damon Blackwood  [phone]    INCISION CARE  The postoperative dressings should be left in place until your follow-up appointment.  You will receive instructions at this appointment on whether your injury requires continued immobilization.  Your surgeon will instruct you regarding suture or staple removal. In general, this happens at the 1-2-week postoperative appointment.  Once postoperative splinting is discontinued, new dry dressings can then be placed around the wound and held in place with an Ace wrap.   Dressings should be changed at least daily or if visibly soiled.  Wash your hands prior to dressing changes  No creams or ointments to the incision until 3 weeks post op.   You may remove dressing once the incision is completely free of drainage.  Do not touch or pick at the incision  Check incision every day and notify surgeon immediately if any of the following signs or symptoms are seen:  Increase in redness  Increase in swelling around the incision and of the entire extremity  Increase in pain  NEW drainage or oozing from the incision  Pulling apart of the edges of the incision  Increase in overall body temperature (greater than 100.4°F)    ACTIVITIES  Exercises:  Physical therapy may or may not be needed after surgery. Once some healing has occurred, it will be possible to start therapy if you wish. However, most patients get their function back fairly well after surgery without formal therapy.  Activities of Daily Living:   Showering may begin immediately if the postoperative dressing can be protected. The dressing/splint and incision cannot get wet after surgery.   No tub baths, hot tubs, or swimming pools for 4 weeks.  May shower and let water run over the incision once the sutures are removed if there is no drainage and the wound is well healing. A new dry dressing can be applied after showering.       Restrictions  Weight: Do not put  weight on the injured arm until instructed to do so. Your surgeon will discuss with restrictions in terms of activities allowed. In general, anything more strenuous than holding a pen or piece of paper should be avoided, the other hand should do the vast majority of the work until the injured side had healed enough that it is not painful.  Driving: Many patients have questions about when it is safe to return to driving. The answer is that this is extremely variable. It depends on how quickly you heal. Until you can safely navigate the steering wheel, and are off of all narcotics, driving is not permitted. Your surgeon cannot “clear” you to return to driving, only you can make the decision when you feel it is safe.     Pain Control  Ice:  Ice is an excellent pain reliever. This can be used regardless of whether or not you are taking pain medication.  Apply an ice pack to the surgical area for 20 minutes at a time, removing it for at least an hour to prevent frostbite.  You should keep a towel between any dressings on the ice pack to prevent them from getting damp and from developing frostbite on the operative site.  Elevation:  Elevation is another easy way to control pain after surgery. Whenever possible, keep the operative limb elevated above the level of your heart to reduce swelling.  Acetaminophen (Tylenol):  CLASSIFICATION: A non-narcotic medication that is available without a prescription.  Acetaminophen controls pain but does not affect swelling or inflammation.  DRIVING: Acetaminophen will not impair your ability to drive. It is safe to drive while taking if your physical condition does not limit you.  POTENTIAL SIDE EFFECTS: nausea, stomach upset, liver failure if taken in large doses.  Interactions: Some narcotic medications contain acetaminophen. If you have a narcotic prescription, make sure to cut back on the acetaminophen if you are taking the narcotic. You should never take more 3000 mg of  acetaminophen in one 24-hour period.  DOSAGE:  Following surgery, you may take two regular strength (325 mg) tabs to control pain every 6 hours. This can be taken with NSAIDs (see below) or alternating the two.  After the initial surgical pain begins to resolve, you may begin to decrease the pain medication. By the end of a few weeks, you should be off of pain medications.  NSAIDS: This includes aspirin, ibuprofen, naproxen, Motrin, Aleve, Mobic, Celebrex  CLASSIFICATION: These are non-narcotic medications that are available without a prescription.  They are particularly effective at reducing swelling and inflammation  DRIVING: These medications will not impair your ability to drive. It is safe to drive while taking these medications if your physical condition does not limit you.  POTENTIAL SIDE EFFECTS: nausea, stomach upset, ulcer, gastric bleeding, kidney failure.  DOSAGE:  Following surgery, you may take ibuprofen (Motrin) 600 mg to control pain every 6 hours with food. It helps to take it scheduled (around the clock) to allow it to help reduce swelling.  After the initial surgical pain begins to resolve, you may begin to decrease the pain medication. By the end of a few weeks, you should be off of pain medications.  Narcotic Pain Medications: Utilized after surgery. This is some general information about these medications.  CLASSIFICATION: Prescription pain medications are called Opioids and are narcotics  LEGALITIES: It is illegal to share narcotics with others  DRIVING: it is illegal to drive while under the influence of narcotics. Doing so is a DUI.  POTENTIAL SIDE EFFECTS: nausea, vomiting, itching, dizziness, drowsiness, dry mouth, constipation, and difficulty urinating.  POTENTIAL ADVERSE EFFECTS:  Opioid tolerance can develop with use of pain medications and this simply means that it requires more and more of the medication to control pain. However, this is seen more in patients that use opioids for  longer periods of time.  Opioid dependence can develop with use of Opioids. People with opioid dependence will experience withdrawal symptoms upon cessation of the medication.  Opioid addiction can develop with use of Opioids. The incidence of this is very unlikely in patients who take the medications as ordered and stop the medications as instructed.  Opioid overdose can be dangerous, but is unlikely when the medication is taken as ordered and stopped when ordered. It is important not to mix opioids with alcohol as this can lead to over sedation and respiratory difficulty.  DOSAGE:  After the initial surgical pain begins to resolve, you should begin to decrease the pain medication dose and frequency. By the end of a few weeks, you should be off of narcotic pain medications.  Refills will not be given by the office during evening hours, on weekends, or after 6 weeks post-op. You are responsible for weaning off of pain medication.  To seek refills on pain medications during the initial 6-week post-operative period, you must call the office to request the refill. The office will then notify you when to  the prescription. DO NOT wait until you are out of the medication to request a refill. Prescriptions will not be filled over the weekend and it may take a couple days for the prescription to be available. Someone will have to pick the prescription in person at the office.    Other Medications  Anticoagulants: After upper extremity surgery most patients do not require an anticoagulant unless you have another injury that will be keeping you from mobilizing.  If you were already taking an anticoagulant (commonly Aspirin, Coumadin, or Plavix) you will likely be resuming your normal dose postoperatively and will be continuing that medication at the discretion of the prescribing physician.  Stool Softeners: You will be at greater risk of constipation after surgery due to being less mobile and the narcotic pain  medications.  Take stool softeners as needed. Over the counter Colace 100 mg 1-2 capsules twice daily can be taken.  If stools become too loose or too frequent, please decreases the dosage or stop the stool softener.  If constipation occurs despite use of stool softeners, you are to continue the stool softeners and add a laxative (Milk of Magnesia 1 ounce daily as needed)  Drink plenty of fluids, and eat fruits and vegetables during your recovery time. Getting up and mobilizing will help the bowels to recover their regular function, as will weaning off of all narcotics when the pain becomes tolerable.    FOLLOW-UP VISITS  You will need to follow up in the office with your surgeon in 1-2 weeks, or as instructed elsewhere in your discharge paperwork. Please call this number [phone] to schedule this appointment if one has not already been made.   If you have any concerns or suspected complications prior to your follow up visit, please call the office. Do not wait until your appointment time if you suspect complications. These will need to be addressed in the office promptly.      Damon Blackwood MD, PhD  Orthopaedic Surgery  [facility]

## 2022-08-25 NOTE — DISCHARGE INSTRUCTIONS
Take the following medications the morning of surgery:  NONE    If you are on prescription narcotic pain medication to control your pain you may also take that medication the morning of surgery.    General Instructions:  Do not eat solid food after midnight the night before surgery.  You may drink clear liquids day of surgery but must stop at least one hour before your hospital arrival time.  CLEAR LIQUID  UNTIL 9:30 AM  It is beneficial for you to have a clear drink that contains carbohydrates the day of surgery.  We suggest a 12 to 20 ounce bottle of Gatorade or Powerade for non-diabetic patients or a 12 to 20 ounce bottle of G2 or Powerade Zero for diabetic patients. (Pediatric patients, are not advised to drink a 12 to 20 ounce carbohydrate drink)    Clear liquids are liquids you can see through.  Nothing red in color.     Plain water                               Sports drinks  Sodas                                   Gelatin (Jell-O)  Fruit juices without pulp such as white grape juice and apple juice  Popsicles that contain no fruit or yogurt  Tea or coffee (no cream or milk added)  Gatorade / Powerade  G2 / Powerade Zero    Infants may have breast milk up to four hours before surgery.  Infants drinking formula may drink formula up to six hours before surgery.   Patients who avoid smoking, chewing tobacco and alcohol for 4 weeks prior to surgery have a reduced risk of post-operative complications.  Quit smoking as many days before surgery as you can.  Do not smoke, use chewing tobacco or drink alcohol the day of surgery.   If applicable bring your C-PAP/ BI-PAP machine.  Bring any papers given to you in the doctor’s office.  Wear clean comfortable clothes.  Do not wear contact lenses, false eyelashes or make-up.  Bring a case for your glasses.   Bring crutches or walker if applicable.  Remove all piercings.  Leave jewelry and any other valuables at home.  Hair extensions with metal clips must be removed prior  to surgery.  The Pre-Admission Testing nurse will instruct you to bring medications if unable to obtain an accurate list in Pre-Admission Testing.        If you were given a blood bank ID arm band remember to bring it with you the day of surgery.    Preventing a Surgical Site Infection:  For 2 to 3 days before surgery, avoid shaving with a razor because the razor can irritate skin and make it easier to develop an infection.    Any areas of open skin can increase the risk of a post-operative wound infection by allowing bacteria to enter and travel throughout the body.  Notify your surgeon if you have any skin wounds / rashes even if it is not near the expected surgical site.  The area will need assessed to determine if surgery should be delayed until it is healed.  The night prior to surgery shower using a fresh bar of anti-bacterial soap (such as Dial) and clean washcloth.  Sleep in a clean bed with clean clothing.  Do not allow pets to sleep with you.  Shower on the morning of surgery using a fresh bar of anti-bacterial soap (such as Dial) and clean washcloth.  Dry with a clean towel and dress in clean clothing.  Ask your surgeon if you will be receiving antibiotics prior to surgery.  Make sure you, your family, and all healthcare providers clean their hands with soap and water or an alcohol based hand  before caring for you or your wound.    Day of surgery: 8/26/2022 ARRIVAL TIME 10:30 AM  Your arrival time is approximately two hours before your scheduled surgery time.  Upon arrival, a Pre-op nurse and Anesthesiologist will review your health history, obtain vital signs, and answer questions you may have.  The only belongings needed at this time will be a list of your home medications and if applicable your C-PAP/BI-PAP machine.  A Pre-op nurse will start an IV and you may receive medication in preparation for surgery, including something to help you relax.     Please be aware that surgery does come with  discomfort.  We want to make every effort to control your discomfort so please discuss any uncontrolled symptoms with your nurse.   Your doctor will most likely have prescribed pain medications.      If you are going home after surgery you will receive individualized written care instructions before being discharged.  A responsible adult must drive you to and from the hospital on the day of your surgery and stay with you for 24 hours.  Discharge prescriptions can be filled by the hospital pharmacy during regular pharmacy hours.  If you are having surgery late in the day/evening your prescription may be e-prescribed to your pharmacy.  Please verify your pharmacy hours or chose a 24 hour pharmacy to avoid not having access to your prescription because your pharmacy has closed for the day.    If you are staying overnight following surgery, you will be transported to your hospital room following the recovery period.  [facility] has all private rooms.    If you have any questions please call Pre-Admission Testing at [phone].  Deductibles and co-payments are collected on the day of service. Please be prepared to pay the required co-pay, deductible or deposit on the day of service as defined by your plan.    Call your surgeon immediately if you experience any of the following symptoms:  Sore Throat  Shortness of Breath or difficulty breathing  Cough  Chills  Body soreness or muscle pain  Headache  Fever  New loss of taste or smell  Do not arrive for your surgery ill.  Your procedure will need to be rescheduled to another time.  You will need to call your physician before the day of surgery to avoid any unnecessary exposure to hospital staff as well as other patients.

## 2022-08-26 ENCOUNTER — ANESTHESIA (OUTPATIENT)
Dept: PERIOP | Facility: HOSPITAL | Age: 27
End: 2022-08-26

## 2022-08-26 ENCOUNTER — ANESTHESIA EVENT (OUTPATIENT)
Dept: PERIOP | Facility: HOSPITAL | Age: 27
End: 2022-08-26

## 2022-08-26 ENCOUNTER — HOSPITAL ENCOUNTER (OUTPATIENT)
Facility: HOSPITAL | Age: 27
Setting detail: HOSPITAL OUTPATIENT SURGERY
Discharge: HOME | End: 2022-08-26
Attending: STUDENT IN AN ORGANIZED HEALTH CARE EDUCATION/TRAINING PROGRAM | Admitting: STUDENT IN AN ORGANIZED HEALTH CARE EDUCATION/TRAINING PROGRAM

## 2022-08-26 VITALS
DIASTOLIC BLOOD PRESSURE: 64 MMHG | TEMPERATURE: 98.5 F | SYSTOLIC BLOOD PRESSURE: 94 MMHG | HEART RATE: 61 BPM | RESPIRATION RATE: 16 BRPM | OXYGEN SATURATION: 100 %

## 2022-08-26 DIAGNOSIS — G56.01 CARPAL TUNNEL SYNDROME OF RIGHT WRIST: Primary | ICD-10-CM

## 2022-08-26 PROCEDURE — 76942 ECHO GUIDE FOR BIOPSY: CPT | Performed by: STUDENT IN AN ORGANIZED HEALTH CARE EDUCATION/TRAINING PROGRAM

## 2022-08-26 PROCEDURE — 25010000002 PROPOFOL 10 MG/ML EMULSION: Performed by: ANESTHESIOLOGY

## 2022-08-26 PROCEDURE — 0 MEPIVACAINE HCL (PF) 1.5 % SOLUTION: Performed by: ANESTHESIOLOGY

## 2022-08-26 PROCEDURE — 25010000002 MIDAZOLAM PER 1 MG: Performed by: ANESTHESIOLOGY

## 2022-08-26 PROCEDURE — 25010000002 ROPIVACAINE PER 1 MG: Performed by: ANESTHESIOLOGY

## 2022-08-26 PROCEDURE — 25010000002 CEFAZOLIN IN DEXTROSE 2-4 GM/100ML-% SOLUTION: Performed by: STUDENT IN AN ORGANIZED HEALTH CARE EDUCATION/TRAINING PROGRAM

## 2022-08-26 PROCEDURE — 25010000002 DEXAMETHASONE PER 1 MG: Performed by: ANESTHESIOLOGY

## 2022-08-26 PROCEDURE — 25010000002 FENTANYL CITRATE (PF) 50 MCG/ML SOLUTION: Performed by: ANESTHESIOLOGY

## 2022-08-26 PROCEDURE — 25010000002 ONDANSETRON PER 1 MG: Performed by: NURSE ANESTHETIST, CERTIFIED REGISTERED

## 2022-08-26 RX ORDER — MAGNESIUM HYDROXIDE 1200 MG/15ML
LIQUID ORAL AS NEEDED
Status: DISCONTINUED | OUTPATIENT
Start: 2022-08-26 | End: 2022-08-26 | Stop reason: HOSPADM

## 2022-08-26 RX ORDER — LIDOCAINE HYDROCHLORIDE 20 MG/ML
INJECTION, SOLUTION INFILTRATION; PERINEURAL AS NEEDED
Status: DISCONTINUED | OUTPATIENT
Start: 2022-08-26 | End: 2022-08-26 | Stop reason: SURG

## 2022-08-26 RX ORDER — ONDANSETRON 2 MG/ML
INJECTION INTRAMUSCULAR; INTRAVENOUS AS NEEDED
Status: DISCONTINUED | OUTPATIENT
Start: 2022-08-26 | End: 2022-08-26 | Stop reason: SURG

## 2022-08-26 RX ORDER — FAMOTIDINE 10 MG/ML
20 INJECTION, SOLUTION INTRAVENOUS ONCE
Status: COMPLETED | OUTPATIENT
Start: 2022-08-26 | End: 2022-08-26

## 2022-08-26 RX ORDER — MIDAZOLAM HYDROCHLORIDE 1 MG/ML
1 INJECTION INTRAMUSCULAR; INTRAVENOUS
Status: DISCONTINUED | OUTPATIENT
Start: 2022-08-26 | End: 2022-08-26 | Stop reason: HOSPADM

## 2022-08-26 RX ORDER — PROPOFOL 10 MG/ML
VIAL (ML) INTRAVENOUS CONTINUOUS PRN
Status: DISCONTINUED | OUTPATIENT
Start: 2022-08-26 | End: 2022-08-26 | Stop reason: SURG

## 2022-08-26 RX ORDER — PROMETHAZINE HYDROCHLORIDE 25 MG/1
25 SUPPOSITORY RECTAL ONCE AS NEEDED
Status: DISCONTINUED | OUTPATIENT
Start: 2022-08-26 | End: 2022-08-26 | Stop reason: HOSPADM

## 2022-08-26 RX ORDER — LABETALOL HYDROCHLORIDE 5 MG/ML
5 INJECTION, SOLUTION INTRAVENOUS
Status: DISCONTINUED | OUTPATIENT
Start: 2022-08-26 | End: 2022-08-26 | Stop reason: HOSPADM

## 2022-08-26 RX ORDER — LIDOCAINE HYDROCHLORIDE AND EPINEPHRINE 10; 10 MG/ML; UG/ML
INJECTION, SOLUTION INFILTRATION; PERINEURAL AS NEEDED
Status: DISCONTINUED | OUTPATIENT
Start: 2022-08-26 | End: 2022-08-26 | Stop reason: HOSPADM

## 2022-08-26 RX ORDER — SODIUM CHLORIDE, SODIUM LACTATE, POTASSIUM CHLORIDE, CALCIUM CHLORIDE 600; 310; 30; 20 MG/100ML; MG/100ML; MG/100ML; MG/100ML
9 INJECTION, SOLUTION INTRAVENOUS CONTINUOUS
Status: DISCONTINUED | OUTPATIENT
Start: 2022-08-26 | End: 2022-08-26 | Stop reason: HOSPADM

## 2022-08-26 RX ORDER — ROPIVACAINE HYDROCHLORIDE 5 MG/ML
INJECTION, SOLUTION EPIDURAL; INFILTRATION; PERINEURAL
Status: COMPLETED | OUTPATIENT
Start: 2022-08-26 | End: 2022-08-26

## 2022-08-26 RX ORDER — CEFAZOLIN SODIUM 2 G/100ML
2 INJECTION, SOLUTION INTRAVENOUS ONCE
Status: COMPLETED | OUTPATIENT
Start: 2022-08-26 | End: 2022-08-26

## 2022-08-26 RX ORDER — FENTANYL CITRATE 50 UG/ML
50 INJECTION, SOLUTION INTRAMUSCULAR; INTRAVENOUS
Status: DISCONTINUED | OUTPATIENT
Start: 2022-08-26 | End: 2022-08-26 | Stop reason: HOSPADM

## 2022-08-26 RX ORDER — NALOXONE HCL 0.4 MG/ML
0.4 VIAL (ML) INJECTION AS NEEDED
Status: DISCONTINUED | OUTPATIENT
Start: 2022-08-26 | End: 2022-08-26 | Stop reason: HOSPADM

## 2022-08-26 RX ORDER — SODIUM CHLORIDE 0.9 % (FLUSH) 0.9 %
3-10 SYRINGE (ML) INJECTION AS NEEDED
Status: DISCONTINUED | OUTPATIENT
Start: 2022-08-26 | End: 2022-08-26 | Stop reason: HOSPADM

## 2022-08-26 RX ORDER — HYDROCODONE BITARTRATE AND ACETAMINOPHEN 5; 325 MG/1; MG/1
1 TABLET ORAL ONCE AS NEEDED
Status: DISCONTINUED | OUTPATIENT
Start: 2022-08-26 | End: 2022-08-26 | Stop reason: HOSPADM

## 2022-08-26 RX ORDER — MIDAZOLAM HYDROCHLORIDE 1 MG/ML
INJECTION INTRAMUSCULAR; INTRAVENOUS AS NEEDED
Status: DISCONTINUED | OUTPATIENT
Start: 2022-08-26 | End: 2022-08-26 | Stop reason: SURG

## 2022-08-26 RX ORDER — KETAMINE HYDROCHLORIDE 10 MG/ML
INJECTION INTRAMUSCULAR; INTRAVENOUS AS NEEDED
Status: DISCONTINUED | OUTPATIENT
Start: 2022-08-26 | End: 2022-08-26 | Stop reason: SURG

## 2022-08-26 RX ORDER — HYDROMORPHONE HYDROCHLORIDE 1 MG/ML
0.25 INJECTION, SOLUTION INTRAMUSCULAR; INTRAVENOUS; SUBCUTANEOUS
Status: DISCONTINUED | OUTPATIENT
Start: 2022-08-26 | End: 2022-08-26 | Stop reason: HOSPADM

## 2022-08-26 RX ORDER — GLYCOPYRROLATE 0.2 MG/ML
INJECTION INTRAMUSCULAR; INTRAVENOUS AS NEEDED
Status: DISCONTINUED | OUTPATIENT
Start: 2022-08-26 | End: 2022-08-26 | Stop reason: SURG

## 2022-08-26 RX ORDER — TRAMADOL HYDROCHLORIDE 50 MG/1
50 TABLET ORAL EVERY 6 HOURS PRN
Qty: 8 TABLET | Refills: 0 | Status: SHIPPED | OUTPATIENT
Start: 2022-08-26 | End: 2022-12-30 | Stop reason: HOSPADM

## 2022-08-26 RX ORDER — ENALAPRILAT 2.5 MG/2ML
1.25 INJECTION INTRAVENOUS ONCE AS NEEDED
Status: DISCONTINUED | OUTPATIENT
Start: 2022-08-26 | End: 2022-08-26 | Stop reason: HOSPADM

## 2022-08-26 RX ORDER — IBUPROFEN 200 MG
200 TABLET ORAL EVERY 6 HOURS PRN
COMMUNITY

## 2022-08-26 RX ORDER — OXYCODONE AND ACETAMINOPHEN 7.5; 325 MG/1; MG/1
1 TABLET ORAL ONCE AS NEEDED
Status: DISCONTINUED | OUTPATIENT
Start: 2022-08-26 | End: 2022-08-26 | Stop reason: HOSPADM

## 2022-08-26 RX ORDER — LIDOCAINE HYDROCHLORIDE 10 MG/ML
0.5 INJECTION, SOLUTION EPIDURAL; INFILTRATION; INTRACAUDAL; PERINEURAL ONCE AS NEEDED
Status: DISCONTINUED | OUTPATIENT
Start: 2022-08-26 | End: 2022-08-26 | Stop reason: HOSPADM

## 2022-08-26 RX ORDER — DIPHENHYDRAMINE HYDROCHLORIDE 50 MG/ML
12.5 INJECTION INTRAMUSCULAR; INTRAVENOUS
Status: DISCONTINUED | OUTPATIENT
Start: 2022-08-26 | End: 2022-08-26 | Stop reason: HOSPADM

## 2022-08-26 RX ORDER — ONDANSETRON 4 MG/1
4 TABLET, ORALLY DISINTEGRATING ORAL EVERY 8 HOURS PRN
Qty: 12 TABLET | Refills: 0 | Status: ON HOLD | OUTPATIENT
Start: 2022-08-26 | End: 2022-12-30 | Stop reason: SDUPTHER

## 2022-08-26 RX ORDER — SODIUM CHLORIDE 0.9 % (FLUSH) 0.9 %
3 SYRINGE (ML) INJECTION EVERY 12 HOURS SCHEDULED
Status: DISCONTINUED | OUTPATIENT
Start: 2022-08-26 | End: 2022-08-26 | Stop reason: HOSPADM

## 2022-08-26 RX ORDER — DEXAMETHASONE SODIUM PHOSPHATE 4 MG/ML
INJECTION, SOLUTION INTRA-ARTICULAR; INTRALESIONAL; INTRAMUSCULAR; INTRAVENOUS; SOFT TISSUE
Status: COMPLETED | OUTPATIENT
Start: 2022-08-26 | End: 2022-08-26

## 2022-08-26 RX ORDER — ONDANSETRON 2 MG/ML
4 INJECTION INTRAMUSCULAR; INTRAVENOUS ONCE AS NEEDED
Status: DISCONTINUED | OUTPATIENT
Start: 2022-08-26 | End: 2022-08-26 | Stop reason: HOSPADM

## 2022-08-26 RX ORDER — PROMETHAZINE HYDROCHLORIDE 25 MG/1
25 TABLET ORAL ONCE AS NEEDED
Status: DISCONTINUED | OUTPATIENT
Start: 2022-08-26 | End: 2022-08-26 | Stop reason: HOSPADM

## 2022-08-26 RX ADMIN — MIDAZOLAM 2 MG: 1 INJECTION INTRAMUSCULAR; INTRAVENOUS at 12:54

## 2022-08-26 RX ADMIN — PROPOFOL 75 MCG/KG/MIN: 10 INJECTION, EMULSION INTRAVENOUS at 13:00

## 2022-08-26 RX ADMIN — KETAMINE HYDROCHLORIDE 5 MG: 10 INJECTION INTRAMUSCULAR; INTRAVENOUS at 13:05

## 2022-08-26 RX ADMIN — CEFAZOLIN SODIUM 2 G: 2 INJECTION, SOLUTION INTRAVENOUS at 12:44

## 2022-08-26 RX ADMIN — DEXAMETHASONE SODIUM PHOSPHATE 4 MG: 4 INJECTION, SOLUTION INTRAMUSCULAR; INTRAVENOUS at 11:45

## 2022-08-26 RX ADMIN — MIDAZOLAM 2 MG: 1 INJECTION INTRAMUSCULAR; INTRAVENOUS at 11:38

## 2022-08-26 RX ADMIN — KETAMINE HYDROCHLORIDE 5 MG: 10 INJECTION INTRAMUSCULAR; INTRAVENOUS at 13:10

## 2022-08-26 RX ADMIN — GLYCOPYRROLATE 0.1 MG: 1 INJECTION INTRAMUSCULAR; INTRAVENOUS at 12:54

## 2022-08-26 RX ADMIN — ONDANSETRON 4 MG: 2 INJECTION INTRAMUSCULAR; INTRAVENOUS at 13:59

## 2022-08-26 RX ADMIN — SODIUM CHLORIDE, POTASSIUM CHLORIDE, SODIUM LACTATE AND CALCIUM CHLORIDE 9 ML/HR: 600; 310; 30; 20 INJECTION, SOLUTION INTRAVENOUS at 11:31

## 2022-08-26 RX ADMIN — KETAMINE HYDROCHLORIDE 5 MG: 10 INJECTION INTRAMUSCULAR; INTRAVENOUS at 13:20

## 2022-08-26 RX ADMIN — MEPIVACAINE HYDROCHLORIDE 10 ML: 15 INJECTION, SOLUTION EPIDURAL; INFILTRATION at 11:45

## 2022-08-26 RX ADMIN — KETAMINE HYDROCHLORIDE 5 MG: 10 INJECTION INTRAMUSCULAR; INTRAVENOUS at 13:00

## 2022-08-26 RX ADMIN — LIDOCAINE HYDROCHLORIDE 60 MG: 20 INJECTION, SOLUTION INFILTRATION; PERINEURAL at 13:00

## 2022-08-26 RX ADMIN — FENTANYL CITRATE 50 MCG: 50 INJECTION INTRAMUSCULAR; INTRAVENOUS at 11:38

## 2022-08-26 RX ADMIN — ROPIVACAINE HYDROCHLORIDE 10 ML: 5 INJECTION EPIDURAL; INFILTRATION; PERINEURAL at 11:45

## 2022-08-26 RX ADMIN — KETAMINE HYDROCHLORIDE 5 MG: 10 INJECTION INTRAMUSCULAR; INTRAVENOUS at 13:15

## 2022-08-26 RX ADMIN — FAMOTIDINE 20 MG: 10 INJECTION, SOLUTION INTRAVENOUS at 11:31

## 2022-08-26 NOTE — OP NOTE
Orthopaedic & Hand Surgery  Carpal Tunnel Release Note  Dr. Damon Blackwood  [phone]    PATIENT NAME: Kristin Jones  MRN: [id number]  : 1995 AGE: 26 y.o. GENDER: female    DATE OF OPERATION: 2022    PREOPERATIVE DIAGNOSIS:   • Right persistent carpal Tunnel Syndrome    POSTOPERATIVE DIAGNOSIS:   • Right persistent Carpal Tunnel Syndrome    OPERATION PERFORMED:   • Right Extended Open Carpal Tunnel Release (CPT 65378)    SURGEON: Damon Blackwood MD, PhD    ANESTHESIA: General with regional and local    ASSISTANT: None    ESTIMATED BLOOD LOSS: <10 ml    SPECIMENS: None    TOURNIQUET TIME:   • 21 min    SPONGE AND NEEDLE COUNT: Correct    INDICATIONS: This patient is noted to have persistent pain and occasional paresthesias in the hand concerning for incomplete release after carpal tunnel syndrome surgery. The risks of surgery were discussed and included the risk of anesthesia, infection, wound healing problems, damage to neurovascular structures, incomplete relief or recurrence, loss of range of motion, the need for further procedures, medical complications, and others. No guarantees were made. The patient voiced understanding and a desire to proceed with surgery. Verbal and written consent were obtained.    DETAILS OF PROCEDURE:  The patient was met in the preoperative area. The site was marked. The consent and H&P were reviewed. The patient was then transferred to the operative suite and placed supine on the operative table. The patient submitted to anesthesia. A tourniquet was placed on the right upper arm. Surgical alcohol was used to thoroughly clean the entire operative extremity. The arm was then prepped in the normal sterile fashion, which included Chloroprep and multiple layers of sterile drapes.    A timeout was performed confirming the site and side of surgery, the antibiotic and allergy status and the presence of the necessary surgical equipment.     The limb was exsanguinated  with an Esmarch bandage and the tourniquet was inflated.    A extensile incision was designed in the palm in line with the middle/ring web, making use of the patient's previous incision.  The incision was extended proximally across the wrist in the standard fashion. 3.5 power loupe magnification and the bipolar cautery were used.     The skin was incised and retracted and hemostasis achieved.  The skin flap was elevated proximally and the antebrachial fascia exposed.  The fascia was divided in its midline from distal to proximal exposing the median nerve. Of note, this appeared to be fairly compressive on the median nerve. The transverse fibers of the flexor retinaculum were found to be divided distally but in continuity proximally at the very edge of the antebrachial fascia. These were sharply divided, carefully protecting the adjacent nerve.  Dissection was carried distally beyond the level of the superficial arch. The nerve appeared grossly adherent to the overlying radial fibers of the ligament from the level of the wrist crease to the central level of the carpal tunnel.  The nerve was dissected off of the overlying radial side of the flexor retinaculum and surrounding synovium. There were no anomalous branches of the median nerve or masses in the carpal tunnel.     At this point, we released tourniquet.  Bleeding was controlled with the bipolar cautery and direct pressure. The nerve assumed a red and inflamed appearance following release of the tourniquet.    The wound irrigated with saline and closed with vertical mattress Prolene sutures and a vessel loop pledget.     Sterile dressings and a volar splint leaving the thumb and fingers free and the wrist in slight extension were applied and secured with and ACE bandage. Anesthesia was reversed and the patient was taken to recovery in stable condition. The patient tolerated to procedure well with no immediate complications noted. The estimated blood loss was  minimal. No specimens to pathology. Final sponge and instrument count were reported to me as correct.    POST-OPERATIVE PALN:  • The patient is to keep the hand elevated, and to use the hand lightly.   • Keep hand dry until sutures removed.   • Sutures to be removed on the first post op visit if it appears that the wound is healing satisfactorily.  • Shower OK one day after suture removal.   • Hand may be immersed and scar massage started three days after suture removal.   • Hand can be used gently but progressively until four weeks post op, then normal use and return to regular duty is allowed.  • Anticipate soreness with , pinch and palmar pressure for up to six months.     Damon Blackwood MD, PhD  Orthopaedic & Hand Surgery  [facility]  [phone] - [place] [facility]  [phone] - [facility]

## 2022-08-26 NOTE — ANESTHESIA PROCEDURE NOTES
Peripheral Block      Patient reassessed immediately prior to procedure    Patient location during procedure: pre-op  Start time: 8/26/2022 11:38 AM  Stop time: 8/26/2022 11:45 AM  Reason for block: at surgeon's request and post-op pain management  Performed by  Anesthesiologist: Librado Rutledge MD  Preanesthetic Checklist  Completed: patient identified, IV checked, site marked, risks and benefits discussed, surgical consent, monitors and equipment checked, pre-op evaluation and timeout performed  Prep:  Pt Position: supine  Sterile barriers:gloves and cap  Prep: ChloraPrep  Patient monitoring: blood pressure monitoring, continuous pulse oximetry and EKG  Procedure    Sedation: yes    Guidance:ultrasound guided    ULTRASOUND INTERPRETATION.  Using ultrasound guidance a 21 G gauge needle was placed in close proximity to the brachial plexus nerve, at which point, under ultrasound guidance anesthetic was injected in the area of the nerve and spread of the anesthesia was seen on ultrasound in close proximity thereto.  There were no abnormalities seen on ultrasound; a digital image was taken; and the patient tolerated the procedure with no complications. Images:still images obtained    Laterality:right  Block Type:supraclavicular  Injection Technique:single-shot  Needle Type:echogenic  Needle Gauge:21 G  Resistance on Injection: none    Medications Used: dexamethasone (DECADRON) injection, 4 mg  Mepivacaine HCl (PF) (CARBOCAINE) 1.5 % injection, 10 mL  ropivacaine (NAROPIN) 0.5 % injection, 10 mL  Med administered at 8/26/2022 11:45 AM      Post Assessment  Injection Assessment: negative aspiration for heme, no paresthesia on injection and incremental injection  Patient Tolerance:comfortable throughout block  Complications:no  Additional Notes  Ultrasound guidance was used to view and verify needle placement and medication disbursement.

## 2022-08-26 NOTE — ANESTHESIA POSTPROCEDURE EVALUATION
Patient: Kristin Jones    Procedure Summary     Date: 08/26/22 Room / Location:  [facility]  /  [facility]    Anesthesia Start: 1249 Anesthesia Stop: 1413    Procedure: RIGHT WRIST REVISION CARPAL TUNNEL RELEASE (Right Wrist) Diagnosis:     Surgeons: Damon Blackwood MD Provider: Km Eden DO    Anesthesia Type: regional ASA Status: 2          Anesthesia Type: regional    Vitals  Vitals Value Taken Time   BP 94/64 08/26/22 1435   Temp     Pulse 61 08/26/22 1435   Resp 16 08/26/22 1435   SpO2 100 % 08/26/22 1435           Post Anesthesia Care and Evaluation    Patient location during evaluation: bedside  Patient participation: complete - patient participated  Level of consciousness: awake and alert  Pain score: 0  Pain management: adequate    Airway patency: patent  Anesthetic complications: No anesthetic complications  PONV Status: controlled  Cardiovascular status: acceptable and hemodynamically stable  Respiratory status: acceptable  Hydration status: acceptable    Comments: BP 94/64 (BP Location: Left arm, Patient Position: Sitting)   Pulse 61   Temp 36.9 °C (98.5 °F) (Oral)   Resp 16   LMP 08/05/2022   SpO2 100%     POPC supplied by [facility] with continued effect in expected distribution

## 2022-08-26 NOTE — INTERVAL H&P NOTE
H&P reviewed. The patient was examined and there are no changes to the H&P.    Damon Blackwood MD, PhD  Orthopaedic & Hand Surgery  [facility]  [phone] - [place] [facility]  [phone] - [facility]

## 2022-08-26 NOTE — ANESTHESIA PREPROCEDURE EVALUATION
Anesthesia Evaluation     Patient summary reviewed and Nursing notes reviewed   history of anesthetic complications:  NPO Solid Status: > 8 hours  NPO Liquid Status: > 2 hours           Airway   Mallampati: II  TM distance: >3 FB  Neck ROM: full  No difficulty expected  Dental - normal exam     Pulmonary    (+) a smoker (10 pack years) Current Smoked day of surgery, sleep apnea,   (-) wheezes  Cardiovascular - negative cardio ROS    ECG reviewed  Rhythm: regular    (-) angina, orthopnea, PND, JARVIS    ROS comment: NORMAL ECG -  Sinus rhythm    Neuro/Psych  (+) headaches (Migraine without aura), tremors, numbness, psychiatric history Anxiety,    GI/Hepatic/Renal/Endo - negative ROS     Musculoskeletal (-) negative ROS    Abdominal    Substance History - negative use     OB/GYN negative ob/gyn ROS         Other - negative ROS                       Anesthesia Plan    ASA 2     regional     (Supraclavicular block)    Anesthetic plan, risks, benefits, and alternatives have been provided, discussed and informed consent has been obtained with: patient.        CODE STATUS:

## 2022-12-29 PROBLEM — G56.22 CUBITAL TUNNEL SYNDROME ON LEFT: Status: ACTIVE | Noted: 2022-12-29

## 2022-12-29 PROBLEM — G56.02 CARPAL TUNNEL SYNDROME OF LEFT WRIST: Status: ACTIVE | Noted: 2022-12-29

## 2022-12-30 ENCOUNTER — HOSPITAL ENCOUNTER (OUTPATIENT)
Facility: HOSPITAL | Age: 27
Setting detail: HOSPITAL OUTPATIENT SURGERY
Discharge: HOME | End: 2022-12-30
Attending: STUDENT IN AN ORGANIZED HEALTH CARE EDUCATION/TRAINING PROGRAM | Admitting: STUDENT IN AN ORGANIZED HEALTH CARE EDUCATION/TRAINING PROGRAM
Payer: COMMERCIAL

## 2022-12-30 ENCOUNTER — ANESTHESIA (OUTPATIENT)
Dept: PERIOP | Facility: HOSPITAL | Age: 27
End: 2022-12-30
Payer: COMMERCIAL

## 2022-12-30 ENCOUNTER — ANESTHESIA EVENT (OUTPATIENT)
Dept: PERIOP | Facility: HOSPITAL | Age: 27
End: 2022-12-30
Payer: COMMERCIAL

## 2022-12-30 VITALS
HEART RATE: 61 BPM | BODY MASS INDEX: 24.91 KG/M2 | DIASTOLIC BLOOD PRESSURE: 64 MMHG | RESPIRATION RATE: 15 BRPM | WEIGHT: 158.73 LBS | OXYGEN SATURATION: 100 % | TEMPERATURE: 97.9 F | HEIGHT: 67 IN | SYSTOLIC BLOOD PRESSURE: 131 MMHG

## 2022-12-30 DIAGNOSIS — G56.02 CARPAL TUNNEL SYNDROME OF LEFT WRIST: Primary | ICD-10-CM

## 2022-12-30 DIAGNOSIS — G56.22 CUBITAL TUNNEL SYNDROME ON LEFT: ICD-10-CM

## 2022-12-30 LAB
B-HCG UR QL: NEGATIVE
EXPIRATION DATE: ABNORMAL
INTERNAL NEGATIVE CONTROL: NEGATIVE
INTERNAL POSITIVE CONTROL: POSITIVE
Lab: ABNORMAL

## 2022-12-30 PROCEDURE — 25010000002 MIDAZOLAM PER 1 MG: Performed by: STUDENT IN AN ORGANIZED HEALTH CARE EDUCATION/TRAINING PROGRAM

## 2022-12-30 PROCEDURE — 25010000002 PROPOFOL 10 MG/ML EMULSION: Performed by: NURSE ANESTHETIST, CERTIFIED REGISTERED

## 2022-12-30 PROCEDURE — 25010000002 ROPIVACAINE PER 1 MG: Performed by: STUDENT IN AN ORGANIZED HEALTH CARE EDUCATION/TRAINING PROGRAM

## 2022-12-30 PROCEDURE — 81025 URINE PREGNANCY TEST: CPT | Performed by: STUDENT IN AN ORGANIZED HEALTH CARE EDUCATION/TRAINING PROGRAM

## 2022-12-30 PROCEDURE — 25010000002 CEFAZOLIN IN DEXTROSE 2-4 GM/100ML-% SOLUTION: Performed by: STUDENT IN AN ORGANIZED HEALTH CARE EDUCATION/TRAINING PROGRAM

## 2022-12-30 PROCEDURE — 76942 ECHO GUIDE FOR BIOPSY: CPT | Performed by: STUDENT IN AN ORGANIZED HEALTH CARE EDUCATION/TRAINING PROGRAM

## 2022-12-30 PROCEDURE — 25010000002 DEXAMETHASONE PER 1 MG: Performed by: STUDENT IN AN ORGANIZED HEALTH CARE EDUCATION/TRAINING PROGRAM

## 2022-12-30 PROCEDURE — 25010000002 FENTANYL CITRATE (PF) 50 MCG/ML SOLUTION: Performed by: STUDENT IN AN ORGANIZED HEALTH CARE EDUCATION/TRAINING PROGRAM

## 2022-12-30 RX ORDER — ROPIVACAINE HYDROCHLORIDE 5 MG/ML
INJECTION, SOLUTION EPIDURAL; INFILTRATION; PERINEURAL
Status: COMPLETED | OUTPATIENT
Start: 2022-12-30 | End: 2022-12-30

## 2022-12-30 RX ORDER — ONDANSETRON 4 MG/1
4 TABLET, ORALLY DISINTEGRATING ORAL EVERY 8 HOURS PRN
Qty: 12 TABLET | Refills: 0 | Status: SHIPPED | OUTPATIENT
Start: 2022-12-30 | End: 2023-01-20

## 2022-12-30 RX ORDER — PROMETHAZINE HYDROCHLORIDE 25 MG/1
25 SUPPOSITORY RECTAL ONCE AS NEEDED
Status: DISCONTINUED | OUTPATIENT
Start: 2022-12-30 | End: 2022-12-30 | Stop reason: HOSPADM

## 2022-12-30 RX ORDER — DIPHENHYDRAMINE HYDROCHLORIDE 50 MG/ML
12.5 INJECTION INTRAMUSCULAR; INTRAVENOUS
Status: DISCONTINUED | OUTPATIENT
Start: 2022-12-30 | End: 2022-12-30 | Stop reason: HOSPADM

## 2022-12-30 RX ORDER — FLUMAZENIL 0.1 MG/ML
0.2 INJECTION INTRAVENOUS AS NEEDED
Status: DISCONTINUED | OUTPATIENT
Start: 2022-12-30 | End: 2022-12-30 | Stop reason: HOSPADM

## 2022-12-30 RX ORDER — MAGNESIUM HYDROXIDE 1200 MG/15ML
LIQUID ORAL AS NEEDED
Status: DISCONTINUED | OUTPATIENT
Start: 2022-12-30 | End: 2022-12-30 | Stop reason: HOSPADM

## 2022-12-30 RX ORDER — MIDAZOLAM HYDROCHLORIDE 1 MG/ML
1 INJECTION INTRAMUSCULAR; INTRAVENOUS
Status: COMPLETED | OUTPATIENT
Start: 2022-12-30 | End: 2022-12-30

## 2022-12-30 RX ORDER — FENTANYL CITRATE 50 UG/ML
50 INJECTION, SOLUTION INTRAMUSCULAR; INTRAVENOUS
Status: DISCONTINUED | OUTPATIENT
Start: 2022-12-30 | End: 2022-12-30 | Stop reason: HOSPADM

## 2022-12-30 RX ORDER — PROPOFOL 10 MG/ML
VIAL (ML) INTRAVENOUS AS NEEDED
Status: DISCONTINUED | OUTPATIENT
Start: 2022-12-30 | End: 2022-12-30 | Stop reason: SURG

## 2022-12-30 RX ORDER — LABETALOL HYDROCHLORIDE 5 MG/ML
5 INJECTION, SOLUTION INTRAVENOUS
Status: DISCONTINUED | OUTPATIENT
Start: 2022-12-30 | End: 2022-12-30 | Stop reason: HOSPADM

## 2022-12-30 RX ORDER — ACETAMINOPHEN 500 MG
1000 TABLET ORAL ONCE
Status: COMPLETED | OUTPATIENT
Start: 2022-12-30 | End: 2022-12-30

## 2022-12-30 RX ORDER — DEXAMETHASONE SODIUM PHOSPHATE 4 MG/ML
INJECTION, SOLUTION INTRA-ARTICULAR; INTRALESIONAL; INTRAMUSCULAR; INTRAVENOUS; SOFT TISSUE
Status: COMPLETED | OUTPATIENT
Start: 2022-12-30 | End: 2022-12-30

## 2022-12-30 RX ORDER — NALOXONE HCL 0.4 MG/ML
0.2 VIAL (ML) INJECTION AS NEEDED
Status: DISCONTINUED | OUTPATIENT
Start: 2022-12-30 | End: 2022-12-30 | Stop reason: HOSPADM

## 2022-12-30 RX ORDER — LIDOCAINE HYDROCHLORIDE 10 MG/ML
0.5 INJECTION, SOLUTION EPIDURAL; INFILTRATION; INTRACAUDAL; PERINEURAL ONCE AS NEEDED
Status: DISCONTINUED | OUTPATIENT
Start: 2022-12-30 | End: 2022-12-30 | Stop reason: HOSPADM

## 2022-12-30 RX ORDER — OXYCODONE AND ACETAMINOPHEN 7.5; 325 MG/1; MG/1
1 TABLET ORAL EVERY 4 HOURS PRN
Status: DISCONTINUED | OUTPATIENT
Start: 2022-12-30 | End: 2022-12-30 | Stop reason: HOSPADM

## 2022-12-30 RX ORDER — PROMETHAZINE HYDROCHLORIDE 25 MG/1
25 TABLET ORAL ONCE AS NEEDED
Status: DISCONTINUED | OUTPATIENT
Start: 2022-12-30 | End: 2022-12-30 | Stop reason: HOSPADM

## 2022-12-30 RX ORDER — SODIUM CHLORIDE, SODIUM LACTATE, POTASSIUM CHLORIDE, CALCIUM CHLORIDE 600; 310; 30; 20 MG/100ML; MG/100ML; MG/100ML; MG/100ML
9 INJECTION, SOLUTION INTRAVENOUS CONTINUOUS
Status: DISCONTINUED | OUTPATIENT
Start: 2022-12-30 | End: 2022-12-30 | Stop reason: HOSPADM

## 2022-12-30 RX ORDER — ONDANSETRON 2 MG/ML
4 INJECTION INTRAMUSCULAR; INTRAVENOUS ONCE AS NEEDED
Status: DISCONTINUED | OUTPATIENT
Start: 2022-12-30 | End: 2022-12-30 | Stop reason: HOSPADM

## 2022-12-30 RX ORDER — CEFAZOLIN SODIUM 2 G/100ML
2 INJECTION, SOLUTION INTRAVENOUS ONCE
Status: COMPLETED | OUTPATIENT
Start: 2022-12-30 | End: 2022-12-30

## 2022-12-30 RX ORDER — HYDROMORPHONE HYDROCHLORIDE 1 MG/ML
0.5 INJECTION, SOLUTION INTRAMUSCULAR; INTRAVENOUS; SUBCUTANEOUS
Status: DISCONTINUED | OUTPATIENT
Start: 2022-12-30 | End: 2022-12-30 | Stop reason: HOSPADM

## 2022-12-30 RX ORDER — SODIUM CHLORIDE 0.9 % (FLUSH) 0.9 %
3-10 SYRINGE (ML) INJECTION AS NEEDED
Status: DISCONTINUED | OUTPATIENT
Start: 2022-12-30 | End: 2022-12-30 | Stop reason: HOSPADM

## 2022-12-30 RX ORDER — LIDOCAINE HYDROCHLORIDE 20 MG/ML
INJECTION, SOLUTION INFILTRATION; PERINEURAL AS NEEDED
Status: DISCONTINUED | OUTPATIENT
Start: 2022-12-30 | End: 2022-12-30 | Stop reason: SURG

## 2022-12-30 RX ORDER — EPHEDRINE SULFATE 50 MG/ML
5 INJECTION, SOLUTION INTRAVENOUS ONCE AS NEEDED
Status: DISCONTINUED | OUTPATIENT
Start: 2022-12-30 | End: 2022-12-30 | Stop reason: HOSPADM

## 2022-12-30 RX ORDER — LIDOCAINE HYDROCHLORIDE AND EPINEPHRINE 10; 10 MG/ML; UG/ML
INJECTION, SOLUTION INFILTRATION; PERINEURAL AS NEEDED
Status: DISCONTINUED | OUTPATIENT
Start: 2022-12-30 | End: 2022-12-30 | Stop reason: HOSPADM

## 2022-12-30 RX ORDER — DIPHENHYDRAMINE HCL 25 MG
25 CAPSULE ORAL
Status: DISCONTINUED | OUTPATIENT
Start: 2022-12-30 | End: 2022-12-30 | Stop reason: HOSPADM

## 2022-12-30 RX ORDER — HYDROCODONE BITARTRATE AND ACETAMINOPHEN 7.5; 325 MG/1; MG/1
1 TABLET ORAL ONCE AS NEEDED
Status: DISCONTINUED | OUTPATIENT
Start: 2022-12-30 | End: 2022-12-30 | Stop reason: HOSPADM

## 2022-12-30 RX ORDER — HYDROCODONE BITARTRATE AND ACETAMINOPHEN 5; 325 MG/1; MG/1
1 TABLET ORAL EVERY 6 HOURS PRN
Qty: 10 TABLET | Refills: 0 | Status: SHIPPED | OUTPATIENT
Start: 2022-12-30 | End: 2023-01-20

## 2022-12-30 RX ORDER — SODIUM CHLORIDE 0.9 % (FLUSH) 0.9 %
3 SYRINGE (ML) INJECTION EVERY 12 HOURS SCHEDULED
Status: DISCONTINUED | OUTPATIENT
Start: 2022-12-30 | End: 2022-12-30 | Stop reason: HOSPADM

## 2022-12-30 RX ORDER — HYDRALAZINE HYDROCHLORIDE 20 MG/ML
5 INJECTION INTRAMUSCULAR; INTRAVENOUS
Status: DISCONTINUED | OUTPATIENT
Start: 2022-12-30 | End: 2022-12-30 | Stop reason: HOSPADM

## 2022-12-30 RX ADMIN — PROPOFOL 70 MG: 10 INJECTION, EMULSION INTRAVENOUS at 12:27

## 2022-12-30 RX ADMIN — DEXAMETHASONE SODIUM PHOSPHATE 4 MG: 4 INJECTION, SOLUTION INTRAMUSCULAR; INTRAVENOUS at 10:41

## 2022-12-30 RX ADMIN — FENTANYL CITRATE 50 MCG: 50 INJECTION, SOLUTION INTRAMUSCULAR; INTRAVENOUS at 10:41

## 2022-12-30 RX ADMIN — LIDOCAINE HYDROCHLORIDE 80 MG: 20 INJECTION, SOLUTION INFILTRATION; PERINEURAL at 12:27

## 2022-12-30 RX ADMIN — ACETAMINOPHEN 1000 MG: 500 TABLET ORAL at 10:13

## 2022-12-30 RX ADMIN — MIDAZOLAM 1 MG: 1 INJECTION INTRAMUSCULAR; INTRAVENOUS at 10:41

## 2022-12-30 RX ADMIN — MIDAZOLAM 1 MG: 1 INJECTION INTRAMUSCULAR; INTRAVENOUS at 10:45

## 2022-12-30 RX ADMIN — ROPIVACAINE HYDROCHLORIDE 29 ML: 5 INJECTION EPIDURAL; INFILTRATION; PERINEURAL at 10:41

## 2022-12-30 RX ADMIN — SODIUM CHLORIDE, POTASSIUM CHLORIDE, SODIUM LACTATE AND CALCIUM CHLORIDE 9 ML/HR: 600; 310; 30; 20 INJECTION, SOLUTION INTRAVENOUS at 10:00

## 2022-12-30 RX ADMIN — CEFAZOLIN SODIUM 2 G: 2 INJECTION, SOLUTION INTRAVENOUS at 12:14

## 2022-12-30 RX ADMIN — PROPOFOL 200 MCG/KG/MIN: 10 INJECTION, EMULSION INTRAVENOUS at 12:27

## 2022-12-30 RX ADMIN — SODIUM CHLORIDE, POTASSIUM CHLORIDE, SODIUM LACTATE AND CALCIUM CHLORIDE: 600; 310; 30; 20 INJECTION, SOLUTION INTRAVENOUS at 13:36

## 2022-12-30 NOTE — ANESTHESIA PREPROCEDURE EVALUATION
Anesthesia Evaluation     Patient summary reviewed and Nursing notes reviewed   no history of anesthetic complications:  NPO Solid Status: > 8 hours  NPO Liquid Status: > 2 hours           Airway   Mallampati: II  TM distance: >3 FB  Neck ROM: full  Dental      Pulmonary    (+) sleep apnea,   Cardiovascular         Neuro/Psych  (+) psychiatric history,    GI/Hepatic/Renal/Endo      Musculoskeletal     Abdominal    Substance History      OB/GYN          Other                        Anesthesia Plan    ASA 2     MAC and regional     intravenous induction     Anesthetic plan, risks, benefits, and alternatives have been provided, discussed and informed consent has been obtained with: patient.        CODE STATUS:       
No

## 2022-12-30 NOTE — ANESTHESIA PROCEDURE NOTES
Peripheral Block    Pre-sedation assessment completed: 12/30/2022 10:40 AM    Patient reassessed immediately prior to procedure    Patient location during procedure: pre-op  Start time: 12/30/2022 10:41 AM  Stop time: 12/30/2022 10:49 AM  Reason for block: at surgeon's request and post-op pain management  Performed by  Anesthesiologist: Lenny Larson MD  Preanesthetic Checklist  Completed: patient identified, IV checked, site marked, risks and benefits discussed, surgical consent, monitors and equipment checked, pre-op evaluation and timeout performed  Prep:  Pt Position: supine  Sterile barriers:cap, mask and gloves  Prep: ChloraPrep  Patient monitoring: blood pressure monitoring, continuous pulse oximetry and EKG  Procedure    Sedation: yes  Performed under: local infiltration  Guidance:ultrasound guided    ULTRASOUND INTERPRETATION.  Using ultrasound guidance a 21 G gauge needle was placed in close proximity to the nerve, at which point, under ultrasound guidance anesthetic was injected in the area of the nerve and spread of the anesthesia was seen on ultrasound in close proximity thereto.  There were no abnormalities seen on ultrasound; a digital image was taken; and the patient tolerated the procedure with no complications. Images:still images obtained, printed/placed on chart    Laterality:left  Block Type:supraclavicular  Injection Technique:single-shot  Needle Type:echogenic and Tuohy  Needle Gauge:21 G  Resistance on Injection: none    Medications Used: dexamethasone (DECADRON) injection - Injection   4 mg - 12/30/2022 10:41:00 AM  ropivacaine (NAROPIN) 0.5 % injection - Injection   29 mL - 12/30/2022 10:41:00 AM      Post Assessment  Injection Assessment: negative aspiration for heme, no paresthesia on injection and incremental injection  Patient Tolerance:comfortable throughout block  Complications:no  Additional Notes  Ultrasound guidance used to visualize nerve anatomy, guide needle placement and  verify local anesthetic disbursement.

## 2022-12-30 NOTE — DISCHARGE INSTRUCTIONS
Orthopaedic & Hand Surgery  Discharge Instructions  Dr. Damon Blackwood  [phone]    INCISION CARE  Wash your hands prior to dressing changes  The postoperative dressings should be taken off starting on postoperative day #4. New dry dressings can then be placed around the wound and held in place with an Ace wrap. Dressings should be changed daily.  No creams or ointments to the incision until 3 weeks post op.   It is okay to wash the incision with clean water (water you would drink) and soap but do not scrub. Do not soak your incision. After after showering or washing her hands, pat the wound dry gently and cover with new dry dressings.  Do not touch or pick at the incision  Check incision every day and notify surgeon immediately if any of the following signs or symptoms are seen:  Increase in redness  Increase in swelling around the incision and of the entire extremity  Increase in pain  NEW drainage or oozing from the incision  Pulling apart of the edges of the incision  Increase in overall body temperature (greater than 100.4°F)  Your surgeon will instruct you regarding suture or staple removal. In general, this happens at the 1-2-week post op appointment.    ACTIVITIES  Exercises:  Physical therapy may or may not be needed after surgery. Once some healing has occurred, it will be possible to start therapy if you wish. However, most patients get their function back fairly well after surgery without formal therapy.  Activities of Daily Living:   Showering may begin immediately if the wrist can be protected. The splint and incision cannot get wet after surgery.   No tub baths, hot tubs, or swimming pools for 4 weeks.  May shower and let water run over the incision once the sutures are removed if there is no drainage and the wound is well healing. A new dry dressing can be applied after showering.     Restrictions  Weight: Do not put weight on the wrist until instructed to do so. Your surgeon will discuss  with restrictions in terms of activities allowed. In general, anything more strenuous than holding a pen or piece of paper should be avoided, the other wrist should do the vast majority of the work until the soft tissues have healed enough that it is not painful, then it is ok to use the wrist more strenuously.   Driving: Many patients have questions about when it is safe to return to driving. The answer is that this is extremely variable. It depends on how quickly you heal. Until you can safely navigate the steering wheel, and are off of all narcotics, driving is not permitted. Your surgeon cannot “clear” you to return to driving, only you can make the decision when you feel it is safe.     Pain Control  Ice:  Ice is an excellent pain reliever. This can be used regardless of whether or not you are taking pain medication.  Apply an ice pack to the surgical area for 20 minutes at a time, removing it for at least an hour to prevent frostbite.  You should keep a towel between any dressings on the ice pack to prevent them from getting damp and from developing frostbite on the operative site.  Elevation:  Elevation is another easy way to control pain after surgery. Whenever possible, keep the operative limb elevated above the level of your heart to reduce swelling.  Acetaminophen (Tylenol):  CLASSIFICATION: A non-narcotic medication that is available without a prescription.  Acetaminophen controls pain but does not affect swelling or inflammation.  DRIVING: Acetaminophen will not impair your ability to drive. It is safe to drive while taking if your physical condition does not limit you.  POTENTIAL SIDE EFFECTS: nausea, stomach upset, liver failure if taken in large doses.  Interactions: Some narcotic medications contain acetaminophen. If you have a narcotic prescription, make sure to cut back on the acetaminophen if you are taking the narcotic. You should never take more 3000 mg of acetaminophen in one 24-hour  period.  DOSAGE:  Following surgery, you may take two regular strength (325 mg) tabs to control pain every 6 hours. This can be taken with NSAIDs (see below) or alternating the two.  After the initial surgical pain begins to resolve, you may begin to decrease the pain medication. By the end of a few weeks, you should be off of pain medications.  NSAIDS: This includes aspirin, ibuprofen, naproxen, Motrin, Aleve, Mobic, Celebrex  CLASSIFICATION: These are non-narcotic medications that are available without a prescription.  They are particularly effective at reducing swelling and inflammation  DRIVING: These medications will not impair your ability to drive. It is safe to drive while taking these medications if your physical condition does not limit you.  POTENTIAL SIDE EFFECTS: nausea, stomach upset, ulcer, gastric bleeding, kidney failure.  DOSAGE:  Following surgery, you may take ibuprofen (Motrin) 600 mg to control pain every 6 hours with food. It helps to take it scheduled (around the clock) to allow it to help reduce swelling.  After the initial surgical pain begins to resolve, you may begin to decrease the pain medication. By the end of a few weeks, you should be off of pain medications.    Medications  Anticoagulants: After upper extremity surgery most patients do not require an anticoagulant unless you have another injury that will be keeping you from mobilizing.  If you were already taking an anticoagulant (commonly Aspirin, Coumadin, or Plavix) you will likely be resuming your normal dose postoperatively and will be continuing that medication at the discretion of the prescribing physician.    FOLLOW-UP VISITS  You will need to follow up in the office with your surgeon in 1-2 weeks, or as instructed elsewhere in your discharge paperwork. Please call this number [phone] to schedule this appointment if one has not already been made.  If you have any concerns or suspected complications prior to your follow  up visit, please call the office. Do not wait until your appointment time if you suspect complications. These will need to be addressed in the office promptly.      Damon Blackwood MD, PhD  Orthopaedic Surgery  [facility]          What to expect after a Nerve Block    Nerve blocks administered to block pain affect many types of nerves, including those nerves that control movement, pain, and normal sensation. Following a nerve block, you may notice some bruising at the site where the block was given. You may experience sensations such as: numbness of the affected area or limb, tingling, heaviness (that is the limb feels heavy to you), weakness or inability to move the affected arm or leg, or a feeling as if your arm or leg has “fallen asleep.”     A nerve block can last from 2 to 36 hours depending on the medications used.  Usually the weakness wears off first followed by the tingling and heaviness. As the block wears off, you may begin to notice pain; however, this sequence of events may occur in any order. Typically, you will be able to move your limb before you will feel it. Once a nerve block begins to wear off, the effects are usually completely gone within 60 minutes.  If you experience continued side effects that you believe are block related for longer than 48 hours, please call your healthcare provider. Please see block-specific instructions below.    Instructions for any block involving the shoulder or arm  If you have had any kind of shoulder/arm block, you will go home with your arm in a sling. Wear the sling until the block has completely worn off. You may be required to wear it for a longer period of time per your surgeon’s recommendations.  If you have had a shoulder/arm block, it is a good idea to sleep on a recliner with pillows under your arm.    You may experience symptoms such as:  Shortness of breath  Hoarseness   Blurry vision  Unequal pupils  Drooping of your face on the  same side as the block was performed    These are side effects associated with this kind of block and should go away within 12 hours.    Note: If you have severe or prolonged shortness of breath, please seek medical assistance as soon as possible.     Protection of a “blocked” arm or leg (limb)  After a nerve block, you cannot feel pain, pressure, or extremes of temperature in the affected limb. And because of this, your blocked limb is at more risk for injury. For example, it is possible to burn your limb on an extremely hot surface without feeling it.     When resting, it is important to reposition your limb periodically to avoid prolonged pressure on it. This may require the use of pillows and padding.    While sleeping, you should avoid rolling onto the affected limb or putting too much pressure on it.     If you have a cast or tight dressing, check the color of your fingers or toes of the affected limb. Call your surgeon if they look discolored (that is, dusky, dark colored).    Use caution in cold weather. Cover your limb appropriately to protect it from the cold.      Pain Management:    Your surgeon will give you a prescription for pain medication. Begin taking this before the nerve block wears off. Bear in mind that sometimes the block can wear off in the middle of the night.

## 2022-12-30 NOTE — ANESTHESIA POSTPROCEDURE EVALUATION
Patient: Kristin Jones    Procedure Summary     Date: 12/30/22 Room / Location:  [facility] [address] [place] [address] [facility]    Anesthesia Start: 1221 Anesthesia Stop: 1359    Procedure: LEFT CARPAL TUNNEL RELEASE AND CUBITAL TUNNEL RELEASE (Left: Wrist) Diagnosis:     Surgeons: Damon Blackwood MD Provider: Lenny Larson MD    Anesthesia Type: MAC, regional ASA Status: 2          Anesthesia Type: MAC, regional    Vitals  Vitals Value Taken Time   /59 12/30/22 1410   Temp     Pulse 65 12/30/22 1410   Resp 16 12/30/22 1410   SpO2 100 % 12/30/22 1410           Post Anesthesia Care and Evaluation    Patient location during evaluation: bedside  Patient participation: complete - patient participated  Level of consciousness: awake and alert  Pain management: adequate    Airway patency: patent  Anesthetic complications: No anesthetic complications  PONV Status: controlled  Cardiovascular status: blood pressure returned to baseline and acceptable  Respiratory status: acceptable  Hydration status: acceptable

## 2022-12-30 NOTE — OP NOTE
Orthopaedic & Hand Surgery  Carpal Tunnel & Cubital Tunnel Release Note  Dr. Damon Blackwood  [phone]    PATIENT NAME: Kristin Jones  MRN: [id number]  : 1995 AGE: 27 y.o. GENDER: female  DATE OF OPERATION: 2022  PREOPERATIVE DIAGNOSIS:   • Left Cubital Tunnel Syndrome  • Left Carpal Tunnel Syndrome  POSTOPERATIVE DIAGNOSIS:   • Left Cubital Tunnel Syndrome  • Left Carpal Tunnel Syndrome  OPERATION PERFORMED:   • Left in situ Cubital Tunnel Release (CPT 27131)  • Left Carpal Tunnel Release (CPT 18458)  SURGEON: Damon Blackwood MD, PhD  ANESTHESIA: Local and General  ASSISTANT: None  ESTIMATED BLOOD LOSS: <5 ml  SPONGE AND NEEDLE COUNT: Correct    INDICATIONS: This patient is noted to have cubital tunnel and carpal tunnel syndrome that failed conservative treatment consisting of bracing. The risks of surgery were discussed and included Pain, Bleeding, Infection, Complications of anesthesia, Damage to blood vessels, nerves and other surrounding structures, Stiffness, Scar, Incomplete resolution or recurrence of the condition, Further procedures and Unforeseen risks of surgery including stroke, heart stoppage or death. No guarantees were made. Following a thorough discussion, questions were solicited and answered to her satisfaction. Verbal and written consent were obtained prior to proceeding with surgery.    SPECIMENS:   • None    PERTINENT FINDINGS:   • No evidence of subluxation of the ulnar nerve on flexion and extension of the elbow after release.    TOURNIQUET TIME:   • 38 Minutes    DETAILS OF PROCEDURE:  The patient was met in the preoperative area. The sites were marked. The consent and H&P were reviewed. The patient was then transferred to the operative suite.    The patient was positioned supine with the left arm extended on an arm board. General anesthesia was induced. The hand and arm were prepped and draped in normal sterile fashion which included alcohol, chlorhexidine and  multiple layers of sterile draping.     A timeout was performed confirming the sites and side of surgery, the antibiotic and allergy status and the presence of the necessary surgical equipment.     A sterile tourniquet was placed high on the arm, the extremity was then exsanguinated and the tourniquet was inflated. Local anesthetic containing epinephrine was then infiltrated subcutaneously surrounding the medial elbow incision for hemostasis purposes.    Attention was then turned to the carpal tunnel release. A one inch incision was designed in the palm in line with the middle/ring web, making use of an existing crease.      The skin was incised and retracted and hemostasis was achieved. The palmar fascia was divided and the superficial palmar arch protected. The transverse fibers of the flexor retinaculum were then divided under direct vision from distal to proximal. The contents of the carpal tunnel were retracted radially and the proximal ligament and antebrachial fascia were divided, under direct vision to one and one half inches proximal to the wrist crease. This was confirmed visually and palpably with the edge of a freer elevator. Dissection was carried distally to a point visibly and palpably distal to the superficial palmar arch, completely opening the ligament and decompressing the median nerve. The nerve appeared compressed. It was dissected off of the overlying radial side of the flexor retinaculum, to which it was adherent. There were no anomalous branches of the median nerve or masses in the carpal tunnel. Subcutaneous tissues were then infiltrated with local anesthetic containing epinephrine for hemostasis purposes.    Attention was then turned to the cubital tunnel release. The skin was incised with a number 15 blade. The subcutaneous tissue was divided carefully.     The fascial layer, triceps tendon as well as the posterior aspect of the flexor pronator mass were identified and a medial  full-thickness fasciocutaneous flap was raised uncovering the medial epicondyle and the ulnar nerve behind the medial intermuscular septum. The nerve was then traced distally into the area of Chua's ligament. In addition to an increased thickness of Chua's ligament, there was an anconeus epitrochlear areas muscle that was constricted over the nerve. Also through a very prominent medial head of the triceps.     The Chua's ligament was then released and there appeared to be some narrowing of the nerve with some post-stenotic prominence of the nerve. The overlying muscles anconeus epitrochlear's was excised. The nerve was then traced out into the flexor pronator mass between the two heads of the FCU tendon about a distance of 7 to 9 cm. Fascial bands were divided carefully. After this, the nerve was traced proximally behind the medial intermuscular septum approximately 10 cm.      The nerve was then taken through range of motion, and appeared to have no adherences. There appeared to be no osteophytes or synovitic tissue from the elbow joint and no tendency of the nerve to subluxate with range of motion.    The tourniquet was then deflated at 38 minutes. Hemostasis was obtained using a combination of direct pressure and bipolar electrocautery. The wounds were copiously irrigated then the elbow wound was closed in a layered fashion. Carpal tunnel incision was closed utilizing 4-0 Prolene suture in a vertical mattress configuration over a vessel loop pledget. Sterile dressings were applied and secured with an Ace bandage. Anesthesia was reversed with uneventful emergence, and the patient was taken to recovery in stable condition. The patient tolerated the procedure well with no immediate complications noted. The estimated blood loss was minimal. No specimens to pathology. Final sponge and instrument count were reported to me as correct.    Damon Blackwood MD, PhD  Orthopaedic & Hand Surgery  [facility]  [phone] - [place] Office  [phone] - [facility]

## 2022-12-30 NOTE — INTERVAL H&P NOTE
H&P reviewed. The patient was examined and there are no changes to the H&P.    Damon Blackwood MD, PhD  Orthopaedic & Hand Surgery  [facility]  [phone] - [place] Office  [phone] - [facility]

## 2023-01-20 ENCOUNTER — OFFICE VISIT (OUTPATIENT)
Dept: OBSTETRICS AND GYNECOLOGY | Facility: CLINIC | Age: 28
End: 2023-01-20
Payer: COMMERCIAL

## 2023-01-20 VITALS
BODY MASS INDEX: 24.74 KG/M2 | HEIGHT: 67 IN | HEART RATE: 81 BPM | WEIGHT: 157.6 LBS | DIASTOLIC BLOOD PRESSURE: 68 MMHG | SYSTOLIC BLOOD PRESSURE: 104 MMHG

## 2023-01-20 DIAGNOSIS — Z01.419 WELL WOMAN EXAM WITH ROUTINE GYNECOLOGICAL EXAM: Primary | ICD-10-CM

## 2023-01-20 PROCEDURE — 3008F BODY MASS INDEX DOCD: CPT | Performed by: OBSTETRICS & GYNECOLOGY

## 2023-01-20 PROCEDURE — 2014F MENTAL STATUS ASSESS: CPT | Performed by: OBSTETRICS & GYNECOLOGY

## 2023-01-20 PROCEDURE — 99395 PREV VISIT EST AGE 18-39: CPT | Performed by: OBSTETRICS & GYNECOLOGY

## 2023-01-20 RX ORDER — LACTIC ACID, L-, CITRIC ACID MONOHYDRATE, AND POTASSIUM BITARTRATE 90; 50; 20 MG/5G; MG/5G; MG/5G
5 GEL VAGINAL ONCE AS NEEDED
Qty: 6 APPLICATOR | Refills: 0 | COMMUNITY
Start: 2023-01-20

## 2023-01-20 NOTE — PROGRESS NOTES
Chief Complaint   Patient presents with   • Annual Exam     Pt presents today for annual exam. Last annual-2022, last pap smear- 2020        Kristin Jones is a 27 y.o.  who presents for an annual examination     Pap history:  Last pap: 2020 NIL  Prior abnormal paps: no  STDs  Sexually active: yes  History of STDs: yes, chlamydia  Has had HPV vaccine: yes  Contraception:  Condoms  Periods are regular.       Screening for BRCA-   Is patient's family history significant for BRCA risk factors? yes, reports through PCP and was negative  Mom had breast cancer at young age    Past Medical History:   Diagnosis Date   • Anxiety    • Carpal tunnel syndrome     bilateral   • Chlamydia    • Decreased ROM of wrist     RIGHT   • Depression    • Low blood sugar    • Presence of surgical incision     SLIGHTLY OPEN RIGHT WRIST PT STATES FROM PHYSICAL THERAPY   • RLS (restless legs syndrome)    • Sleep apnea     MILD NO MACHINE   • Spinal headache 20: spinal tap, : from epidural   • Tremor     RIGHT HAND     Past Surgical History:   Procedure Laterality Date   • CARPAL TUNNEL RELEASE Right 2022    Procedure: RIGHT CARPAL TUNNEL RELEASE;  Surgeon: Damon Blackwood MD;  Location: [facility];  Service: Orthopedics;  Laterality: Right;   • CARPAL TUNNEL RELEASE Right 2022    Procedure: RIGHT WRIST REVISION CARPAL TUNNEL RELEASE;  Surgeon: Damon Blackwood MD;  Location: [facility];  Service: Orthopedics;  Laterality: Right;   • CARPAL TUNNEL RELEASE WITH CUBITAL TUNNEL RELEASE Left 2022    Procedure: LEFT CARPAL TUNNEL RELEASE AND CUBITAL TUNNEL RELEASE;  Surgeon: Damon Blackwood MD;  Location: [facility];  Service: Orthopedics;  Laterality: Left;   • WISDOM TOOTH EXTRACTION  2015     OB History    Para Term  AB Living   1 1 1     1   SAB IAB Ectopic Molar Multiple Live Births             1      # Outcome Date GA Lbr   Steph from [employer] calling to request order for Elastography. She states this needs to be a separate order, which they can link to US liver that was recently placed.    Weight Sex Delivery Anes PTL Lv   1 Term 11/30/20 39w6d 06:20 / 00:48 3711 g (8 lb 2.9 oz) M Vag-Spont Spinal N CORBIN      Birth Comments: Infant Scale 2      Social History     Tobacco Use   • Smoking status: Heavy Smoker     Packs/day: 1.00     Years: 10.00     Pack years: 10.00     Types: Cigarettes   • Smokeless tobacco: Never   Vaping Use   • Vaping Use: Former   Substance Use Topics   • Alcohol use: Yes     Comment: rarely   • Drug use: Yes     Types: Marijuana     Comment: USES OCCASIONALLY/LAST TIME4 DAYS AGO     Family History   Problem Relation Age of Onset   • Breast cancer Mother         survivor   • Hypertension Father    • Liver cancer Father    • Lung cancer Father    • Bone cancer Father    • Cervical cancer Sister         40's   • Diabetes Maternal Aunt    • Melanoma Maternal Uncle    • Prostate cancer Paternal Uncle    • Cancer Maternal Grandmother         mouth   • Lung cancer Maternal Grandmother         double   • Colon cancer Paternal Grandmother         80's   • Prostate cancer Paternal Uncle    • Prostate cancer Paternal Uncle    • Ovarian cancer Neg Hx    • Uterine cancer Neg Hx    • Pulmonary embolism Neg Hx    • Deep vein thrombosis Neg Hx    • Malig Hyperthermia Neg Hx      Current Outpatient Medications on File Prior to Visit   Medication Sig Dispense Refill   • cholecalciferol (VITAMIN D3) 25 MCG (1000 UT) tablet Take 1,000 Units by mouth Daily.     • ibuprofen (ADVIL,MOTRIN) 200 MG tablet Take 200 mg by mouth Every 6 (Six) Hours As Needed for Mild Pain .     • [DISCONTINUED] gabapentin (NEURONTIN) 300 MG capsule Take 300 mg by mouth every night at bedtime.     • [DISCONTINUED] HYDROcodone-acetaminophen (NORCO) 5-325 MG per tablet Take 1 tablet by mouth Every 6 (Six) Hours As Needed for Severe Pain. 10 tablet 0   • [DISCONTINUED] ondansetron ODT (Zofran ODT) 4 MG disintegrating tablet Place 1 tablet on the tongue Every 8 (Eight) Hours As Needed for Nausea or Vomiting. 12 tablet 0     No  "current facility-administered medications on file prior to visit.     Allergies   Allergen Reactions   • Doxycycline GI Intolerance   • Kiwi Extract Other (See Comments)     Oral swelling   • Sulfamethoxazole-Trimethoprim GI Intolerance   • Nickel Dermatitis, Itching and Rash        Review of Systems  See HPI    OBJECTIVE:   Vitals:    01/20/23 1016   BP: 104/68   Pulse: 81   Weight: 71.5 kg (157 lb 9.6 oz)   Height: 170.2 cm (67.01\")      Physical Exam  Exam conducted with a chaperone present.   Constitutional:       General: She is not in acute distress.     Appearance: She is well-developed. She is not diaphoretic.   HENT:      Head: Normocephalic and atraumatic.   Neck:      Thyroid: No thyromegaly.      Trachea: No tracheal deviation.   Cardiovascular:      Rate and Rhythm: Normal rate.      Heart sounds: Normal heart sounds. No murmur heard.    No friction rub. No gallop.   Pulmonary:      Effort: Pulmonary effort is normal. No respiratory distress.      Breath sounds: Normal breath sounds.   Chest:      Chest wall: No tenderness.   Breasts:     Right: No inverted nipple, mass, nipple discharge, skin change or tenderness.      Left: No inverted nipple, mass, nipple discharge, skin change or tenderness.   Abdominal:      General: There is no distension.      Palpations: Abdomen is soft. There is no mass.      Tenderness: There is no abdominal tenderness.   Genitourinary:     General: Normal vulva.      Labia:         Right: No rash, lesion or injury.         Left: No rash, lesion or injury.       Vagina: No vaginal discharge, tenderness or bleeding.      Cervix: No cervical motion tenderness, discharge or friability.      Uterus: Not deviated, not enlarged, not fixed and not tender.       Adnexa:         Right: No mass, tenderness or fullness.          Left: No mass, tenderness or fullness.     Musculoskeletal:         General: No deformity. Normal range of motion.   Lymphadenopathy:      Cervical: No cervical " adenopathy.   Skin:     General: Skin is warm and dry.      Findings: No rash.      Comments: Well healed scars on both wrists   Neurological:      Mental Status: She is alert and oriented to person, place, and time.   Psychiatric:         Behavior: Behavior normal.         Thought Content: Thought content normal.         Judgment: Judgment normal.         ASSESSMENT/PLAN:     Annual well woman exam:  Cervical cancer screening:    Denies cervical dysplasia in past   HPV vaccination completed   The patient is due for a pap today.    Screening guidelines discussed with patient  Breast cancer screening:    Clinical breast exam recommended for age 20-39 years every 1-3 years   Mammogram recommended starting age 40. Last US in Aug 2020 normal, negative. Clinical exam benign   Breast self awareness encouraged  STD Screening   Testing declined.    Contraception :   Condoms     Counseled on options - she reports depression with the Mirena and is hesitant to try other homones. She was given Phexxi samples and will call if she desires Rx  Family history    Reports negative testing with PCP  Healthy lifestyle counseling:   return for routine annual checkups      BMI Counseling  Body mass index is 24.68 kg/m².       Return in about 1 year (around 1/20/2024) for Annual physical.

## 2023-01-25 LAB
CONV .: NORMAL
CYTOLOGIST CVX/VAG CYTO: NORMAL
CYTOLOGY CVX/VAG DOC CYTO: NORMAL
CYTOLOGY CVX/VAG DOC THIN PREP: NORMAL
DX ICD CODE: NORMAL
HIV 1 & 2 AB SER-IMP: NORMAL
OTHER STN SPEC: NORMAL
STAT OF ADQ CVX/VAG CYTO-IMP: NORMAL

## 2023-05-23 ENCOUNTER — HOSPITAL ENCOUNTER (OUTPATIENT)
Dept: GENERAL RADIOLOGY | Facility: HOSPITAL | Age: 28
Discharge: HOME | End: 2023-05-23
Payer: COMMERCIAL

## 2023-05-23 ENCOUNTER — PRE-ADMISSION TESTING (OUTPATIENT)
Dept: PREADMISSION TESTING | Facility: HOSPITAL | Age: 28
End: 2023-05-23
Payer: COMMERCIAL

## 2023-05-23 VITALS
OXYGEN SATURATION: 99 % | RESPIRATION RATE: 16 BRPM | HEART RATE: 83 BPM | HEIGHT: 67 IN | DIASTOLIC BLOOD PRESSURE: 65 MMHG | SYSTOLIC BLOOD PRESSURE: 102 MMHG | TEMPERATURE: 97.8 F | BODY MASS INDEX: 23.73 KG/M2 | WEIGHT: 151.2 LBS

## 2023-05-23 LAB
ALBUMIN SERPL-MCNC: 4.7 G/DL (ref 3.5–5.2)
ALBUMIN/GLOB SERPL: 1.8 G/DL
ALP SERPL-CCNC: 55 U/L (ref 39–117)
ALT SERPL W P-5'-P-CCNC: 11 U/L (ref 1–33)
ANION GAP SERPL CALCULATED.3IONS-SCNC: 8.8 MMOL/L (ref 5–15)
AST SERPL-CCNC: <5 U/L (ref 1–32)
BACTERIA UR QL AUTO: NORMAL /HPF
BASOPHILS # BLD AUTO: 0.05 10*3/MM3 (ref 0–0.2)
BASOPHILS NFR BLD AUTO: 0.7 % (ref 0–1.5)
BILIRUB SERPL-MCNC: 0.7 MG/DL (ref 0–1.2)
BILIRUB UR QL STRIP: NEGATIVE
BUN SERPL-MCNC: 14 MG/DL (ref 6–20)
BUN/CREAT SERPL: 16.3 (ref 7–25)
CALCIUM SPEC-SCNC: 9.6 MG/DL (ref 8.6–10.5)
CHLORIDE SERPL-SCNC: 104 MMOL/L (ref 98–107)
CLARITY UR: CLEAR
CO2 SERPL-SCNC: 27.2 MMOL/L (ref 22–29)
COLOR UR: ABNORMAL
CREAT SERPL-MCNC: 0.86 MG/DL (ref 0.57–1)
DEPRECATED RDW RBC AUTO: 41.2 FL (ref 37–54)
EGFRCR SERPLBLD CKD-EPI 2021: 95.1 ML/MIN/1.73
EOSINOPHIL # BLD AUTO: 0.05 10*3/MM3 (ref 0–0.4)
EOSINOPHIL NFR BLD AUTO: 0.7 % (ref 0.3–6.2)
ERYTHROCYTE [DISTWIDTH] IN BLOOD BY AUTOMATED COUNT: 13.2 % (ref 12.3–15.4)
GLOBULIN UR ELPH-MCNC: 2.6 GM/DL
GLUCOSE SERPL-MCNC: 78 MG/DL (ref 65–99)
GLUCOSE UR STRIP-MCNC: NEGATIVE MG/DL
HCG SERPL QL: NEGATIVE
HCT VFR BLD AUTO: 42.4 % (ref 34–46.6)
HGB BLD-MCNC: 14.2 G/DL (ref 12–15.9)
HGB UR QL STRIP.AUTO: NEGATIVE
HYALINE CASTS UR QL AUTO: NORMAL /LPF
IMM GRANULOCYTES # BLD AUTO: 0.01 10*3/MM3 (ref 0–0.05)
IMM GRANULOCYTES NFR BLD AUTO: 0.1 % (ref 0–0.5)
KETONES UR QL STRIP: NEGATIVE
LEUKOCYTE ESTERASE UR QL STRIP.AUTO: ABNORMAL
LYMPHOCYTES # BLD AUTO: 1.52 10*3/MM3 (ref 0.7–3.1)
LYMPHOCYTES NFR BLD AUTO: 20.5 % (ref 19.6–45.3)
MCH RBC QN AUTO: 28.7 PG (ref 26.6–33)
MCHC RBC AUTO-ENTMCNC: 33.5 G/DL (ref 31.5–35.7)
MCV RBC AUTO: 85.8 FL (ref 79–97)
MONOCYTES # BLD AUTO: 0.47 10*3/MM3 (ref 0.1–0.9)
MONOCYTES NFR BLD AUTO: 6.3 % (ref 5–12)
NEUTROPHILS NFR BLD AUTO: 5.31 10*3/MM3 (ref 1.7–7)
NEUTROPHILS NFR BLD AUTO: 71.7 % (ref 42.7–76)
NITRITE UR QL STRIP: NEGATIVE
NRBC BLD AUTO-RTO: 0 /100 WBC (ref 0–0.2)
PH UR STRIP.AUTO: 6.5 [PH] (ref 5–8)
PLATELET # BLD AUTO: 228 10*3/MM3 (ref 140–450)
PMV BLD AUTO: 9.7 FL (ref 6–12)
POTASSIUM SERPL-SCNC: 4 MMOL/L (ref 3.5–5.2)
PROT SERPL-MCNC: 7.3 G/DL (ref 6–8.5)
PROT UR QL STRIP: NEGATIVE
QT INTERVAL: 357 MS
RBC # BLD AUTO: 4.94 10*6/MM3 (ref 3.77–5.28)
RBC # UR STRIP: NORMAL /HPF
REF LAB TEST METHOD: NORMAL
SODIUM SERPL-SCNC: 140 MMOL/L (ref 136–145)
SP GR UR STRIP: 1.03 (ref 1–1.03)
SQUAMOUS #/AREA URNS HPF: NORMAL /HPF
UROBILINOGEN UR QL STRIP: ABNORMAL
WBC # UR STRIP: NORMAL /HPF
WBC NRBC COR # BLD: 7.41 10*3/MM3 (ref 3.4–10.8)

## 2023-05-23 PROCEDURE — 81001 URINALYSIS AUTO W/SCOPE: CPT

## 2023-05-23 PROCEDURE — 93005 ELECTROCARDIOGRAM TRACING: CPT

## 2023-05-23 PROCEDURE — 84703 CHORIONIC GONADOTROPIN ASSAY: CPT

## 2023-05-23 PROCEDURE — 36415 COLL VENOUS BLD VENIPUNCTURE: CPT

## 2023-05-23 PROCEDURE — 80053 COMPREHEN METABOLIC PANEL: CPT

## 2023-05-23 PROCEDURE — 71046 X-RAY EXAM CHEST 2 VIEWS: CPT

## 2023-05-23 PROCEDURE — 85025 COMPLETE CBC W/AUTO DIFF WBC: CPT

## 2023-05-23 NOTE — DISCHARGE INSTRUCTIONS
Take the following medications the morning of surgery:  NONE      THE HOSPITAL WILL CALL YOU THE DAY BEFORE  WITH YOUR ARRIVAL TIME FOR SURGERY.    If you are on prescription narcotic pain medication to control your pain you may also take that medication the morning of surgery.    General Instructions:  Do not eat solid food after midnight the night before surgery.  You may drink clear liquids day of surgery but must stop at least one hour before your hospital arrival time.  It is beneficial for you to have a clear drink that contains carbohydrates the day of surgery.  We suggest a 12 to 20 ounce bottle of Gatorade or Powerade for non-diabetic patients or a 12 to 20 ounce bottle of G2 or Powerade Zero for diabetic patients. (Pediatric patients, are not advised to drink a 12 to 20 ounce carbohydrate drink)    Clear liquids are liquids you can see through.  Nothing red in color.     Plain water                               Sports drinks  Sodas                                   Gelatin (Jell-O)  Fruit juices without pulp such as white grape juice and apple juice  Popsicles that contain no fruit or yogurt  Tea or coffee (no cream or milk added)  Gatorade / Powerade  G2 / Powerade Zero    Infants may have breast milk up to four hours before surgery.  Infants drinking formula may drink formula up to six hours before surgery.   Patients who avoid smoking, chewing tobacco and alcohol for 4 weeks prior to surgery have a reduced risk of post-operative complications.  Quit smoking as many days before surgery as you can.  Do not smoke, use chewing tobacco or drink alcohol the day of surgery.   If applicable bring your C-PAP/ BI-PAP machine in with you to preop day of surgery.  Bring any papers given to you in the doctor’s office.  Wear clean comfortable clothes.  Do not wear contact lenses, false eyelashes or make-up.  Bring a case for your glasses.   Bring crutches or walker if applicable.  Remove all piercings.  Leave  jewelry and any other valuables at home.  Hair extensions with metal clips must be removed prior to surgery.  The Pre-Admission Testing nurse will instruct you to bring medications if unable to obtain an accurate list in Pre-Admission Testing.        If you were given a blood bank ID arm band remember to bring it with you the day of surgery.    Preventing a Surgical Site Infection:  For 2 to 3 days before surgery, avoid shaving with a razor because the razor can irritate skin and make it easier to develop an infection.    Any areas of open skin can increase the risk of a post-operative wound infection by allowing bacteria to enter and travel throughout the body.  Notify your surgeon if you have any skin wounds / rashes even if it is not near the expected surgical site.  The area will need assessed to determine if surgery should be delayed until it is healed.  The night prior to surgery shower using a fresh bar of anti-bacterial soap (such as Dial) and clean washcloth.  Sleep in a clean bed with clean clothing.  Do not allow pets to sleep with you.  Shower on the morning of surgery using a fresh bar of anti-bacterial soap (such as Dial) and clean washcloth.  Dry with a clean towel and dress in clean clothing.  Ask your surgeon if you will be receiving antibiotics prior to surgery.  Make sure you, your family, and all healthcare providers clean their hands with soap and water or an alcohol based hand  before caring for you or your wound.    Day of surgery:  Your arrival time is approximately two hours before your scheduled surgery time.  Upon arrival, a Pre-op nurse and Anesthesiologist will review your health history, obtain vital signs, and answer questions you may have.  The only belongings needed at this time will be a list of your home medications and if applicable your C-PAP/BI-PAP machine.  A Pre-op nurse will start an IV and you may receive medication in preparation for surgery, including something to  help you relax.     Please be aware that surgery does come with discomfort.  We want to make every effort to control your discomfort so please discuss any uncontrolled symptoms with your nurse.   Your doctor will most likely have prescribed pain medications.      If you are going home after surgery you will receive individualized written care instructions before being discharged.  A responsible adult must drive you to and from the hospital on the day of your surgery and stay with you for 24 hours.  Discharge prescriptions can be filled by the hospital pharmacy during regular pharmacy hours.  If you are having surgery late in the day/evening your prescription may be e-prescribed to your pharmacy.  Please verify your pharmacy hours or chose a 24 hour pharmacy to avoid not having access to your prescription because your pharmacy has closed for the day.    If you are staying overnight following surgery, you will be transported to your hospital room following the recovery period.  [facility] has all private rooms.    If you have any questions please call Pre-Admission Testing at [phone].  Deductibles and co-payments are collected on the day of service. Please be prepared to pay the required co-pay, deductible or deposit on the day of service as defined by your plan.    Call your surgeon immediately if you experience any of the following symptoms:  Sore Throat  Shortness of Breath or difficulty breathing  Cough  Chills  Body soreness or muscle pain  Headache  Fever  New loss of taste or smell  Do not arrive for your surgery ill.  Your procedure will need to be rescheduled to another time.  You will need to call your physician before the day of surgery to avoid any unnecessary exposure to hospital staff as well as other patients.

## 2023-05-25 PROBLEM — G56.21 CUBITAL TUNNEL SYNDROME ON RIGHT: Status: ACTIVE | Noted: 2023-05-25

## 2023-05-25 NOTE — DISCHARGE INSTRUCTIONS
Orthopaedic & Hand Surgery  Discharge Instructions  Dr. Damon Blackwood  [phone]    INCISION CARE  Wash your hands prior to dressing changes  The postoperative dressings should be taken off starting on postoperative day #4. New dry dressings can then be placed around the wound and held in place with an Ace wrap. Dressings should be changed daily.  No creams or ointments to the incision until 3 weeks post op.   It is okay to wash the incision with clean water (water you would drink) and soap but do not scrub. Do not soak your incision. After after showering or washing her hands, pat the wound dry gently and cover with new dry dressings.  Do not touch or pick at the incision  Check incision every day and notify surgeon immediately if any of the following signs or symptoms are seen:  Increase in redness  Increase in swelling around the incision and of the entire extremity  Increase in pain  NEW drainage or oozing from the incision  Pulling apart of the edges of the incision  Increase in overall body temperature (greater than 100.4°F)  Your surgeon will instruct you regarding suture or staple removal. In general, this happens at the 1-2-week post op appointment.    ACTIVITIES  Exercises:  Physical therapy may or may not be needed after surgery. Once some healing has occurred, it will be possible to start therapy if you wish. However, most patients get their function back fairly well after surgery without formal therapy.  Activities of Daily Living:   Showering may begin immediately if the wrist can be protected. The splint and incision cannot get wet after surgery.   No tub baths, hot tubs, or swimming pools for 4 weeks.  May shower and let water run over the incision once the sutures are removed if there is no drainage and the wound is well healing. A new dry dressing can be applied after showering.     Restrictions  Weight: Do not put weight on the wrist until instructed to do so. Your surgeon will discuss  with restrictions in terms of activities allowed. In general, anything more strenuous than holding a pen or piece of paper should be avoided, the other wrist should do the vast majority of the work until the soft tissues have healed enough that it is not painful, then it is ok to use the wrist more strenuously.   Driving: Many patients have questions about when it is safe to return to driving. The answer is that this is extremely variable. It depends on how quickly you heal. Until you can safely navigate the steering wheel, and are off of all narcotics, driving is not permitted. Your surgeon cannot “clear” you to return to driving, only you can make the decision when you feel it is safe.     Pain Control  Ice:  Ice is an excellent pain reliever. This can be used regardless of whether or not you are taking pain medication.  Apply an ice pack to the surgical area for 20 minutes at a time, removing it for at least an hour to prevent frostbite.  You should keep a towel between any dressings on the ice pack to prevent them from getting damp and from developing frostbite on the operative site.  Elevation:  Elevation is another easy way to control pain after surgery. Whenever possible, keep the operative limb elevated above the level of your heart to reduce swelling.  Acetaminophen (Tylenol):  CLASSIFICATION: A non-narcotic medication that is available without a prescription.  Acetaminophen controls pain but does not affect swelling or inflammation.  DRIVING: Acetaminophen will not impair your ability to drive. It is safe to drive while taking if your physical condition does not limit you.  POTENTIAL SIDE EFFECTS: nausea, stomach upset, liver failure if taken in large doses.  Interactions: Some narcotic medications contain acetaminophen. If you have a narcotic prescription, make sure to cut back on the acetaminophen if you are taking the narcotic. You should never take more 3000 mg of acetaminophen in one 24-hour  period.  DOSAGE:  Following surgery, you may take two regular strength (325 mg) tabs to control pain every 6 hours. This can be taken with NSAIDs (see below) or alternating the two.  After the initial surgical pain begins to resolve, you may begin to decrease the pain medication. By the end of a few weeks, you should be off of pain medications.  NSAIDS: This includes aspirin, ibuprofen, naproxen, Motrin, Aleve, Mobic, Celebrex  CLASSIFICATION: These are non-narcotic medications that are available without a prescription.  They are particularly effective at reducing swelling and inflammation  DRIVING: These medications will not impair your ability to drive. It is safe to drive while taking these medications if your physical condition does not limit you.  POTENTIAL SIDE EFFECTS: nausea, stomach upset, ulcer, gastric bleeding, kidney failure.  DOSAGE:  Following surgery, you may take ibuprofen (Motrin) 600 mg to control pain every 6 hours with food. It helps to take it scheduled (around the clock) to allow it to help reduce swelling.  After the initial surgical pain begins to resolve, you may begin to decrease the pain medication. By the end of a few weeks, you should be off of pain medications.    Medications  Anticoagulants: After upper extremity surgery most patients do not require an anticoagulant unless you have another injury that will be keeping you from mobilizing.  If you were already taking an anticoagulant (commonly Aspirin, Coumadin, or Plavix) you will likely be resuming your normal dose postoperatively and will be continuing that medication at the discretion of the prescribing physician.    FOLLOW-UP VISITS  You will need to follow up in the office with your surgeon in 1-2 weeks, or as instructed elsewhere in your discharge paperwork. Please call this number [phone] to schedule this appointment if one has not already been made.  If you have any concerns or suspected complications prior to your follow  up visit, please call the office. Do not wait until your appointment time if you suspect complications. These will need to be addressed in the office promptly.      Damon Blackwood MD, PhD  Orthopaedic Surgery  [facility]        What to expect after a Nerve Block    Nerve blocks administered to block pain affect many types of nerves, including those nerves that control movement, pain, and normal sensation. Following a nerve block, you may notice some bruising at the site where the block was given. You may experience sensations such as: numbness of the affected area or limb, tingling, heaviness (that is the limb feels heavy to you), weakness or inability to move the affected arm or leg, or a feeling as if your arm or leg has “fallen asleep.”     A nerve block can last from 2 to 36 hours depending on the medications used.  Usually the weakness wears off first followed by the tingling and heaviness. As the block wears off, you may begin to notice pain; however, this sequence of events may occur in any order. Typically, you will be able to move your limb before you will feel it. Once a nerve block begins to wear off, the effects are usually completely gone within 60 minutes.  If you experience continued side effects that you believe are block related for longer than 48 hours, please call your healthcare provider. Please see block-specific instructions below.    Instructions for any block involving the shoulder or arm  If you have had any kind of shoulder/arm block, you will go home with your arm in a sling. Wear the sling until the block has completely worn off. You may be required to wear it for a longer period of time per your surgeon’s recommendations.  If you have had a shoulder/arm block, it is a good idea to sleep on a recliner with pillows under your arm.    You may experience symptoms such as:  Shortness of breath  Hoarseness   Blurry vision  Unequal pupils  Drooping of your face on the same  side as the block was performed    These are side effects associated with this kind of block and should go away within 12 hours.    Note: If you have severe or prolonged shortness of breath, please seek medical assistance as soon as possible.     Protection of a “blocked” arm or leg (limb)  After a nerve block, you cannot feel pain, pressure, or extremes of temperature in the affected limb. And because of this, your blocked limb is at more risk for injury. For example, it is possible to burn your limb on an extremely hot surface without feeling it.     When resting, it is important to reposition your limb periodically to avoid prolonged pressure on it. This may require the use of pillows and padding.    While sleeping, you should avoid rolling onto the affected limb or putting too much pressure on it.     If you have a cast or tight dressing, check the color of your fingers or toes of the affected limb. Call your surgeon if they look discolored (that is, dusky, dark colored).    Use caution in cold weather. Cover your limb appropriately to protect it from the cold.      Pain Management:    Your surgeon will give you a prescription for pain medication. Begin taking this before the nerve block wears off. Bear in mind that sometimes the block can wear off in the middle of the night.

## 2023-05-26 ENCOUNTER — HOSPITAL ENCOUNTER (OUTPATIENT)
Facility: HOSPITAL | Age: 28
Setting detail: HOSPITAL OUTPATIENT SURGERY
Discharge: HOME | End: 2023-05-26
Attending: STUDENT IN AN ORGANIZED HEALTH CARE EDUCATION/TRAINING PROGRAM | Admitting: STUDENT IN AN ORGANIZED HEALTH CARE EDUCATION/TRAINING PROGRAM
Payer: COMMERCIAL

## 2023-05-26 ENCOUNTER — ANESTHESIA (OUTPATIENT)
Dept: PERIOP | Facility: HOSPITAL | Age: 28
End: 2023-05-26
Payer: COMMERCIAL

## 2023-05-26 ENCOUNTER — ANESTHESIA EVENT (OUTPATIENT)
Dept: PERIOP | Facility: HOSPITAL | Age: 28
End: 2023-05-26
Payer: COMMERCIAL

## 2023-05-26 VITALS
RESPIRATION RATE: 16 BRPM | SYSTOLIC BLOOD PRESSURE: 100 MMHG | OXYGEN SATURATION: 97 % | HEART RATE: 66 BPM | DIASTOLIC BLOOD PRESSURE: 68 MMHG

## 2023-05-26 DIAGNOSIS — G56.21 CUBITAL TUNNEL SYNDROME ON RIGHT: Primary | ICD-10-CM

## 2023-05-26 PROCEDURE — 25010000002 FENTANYL CITRATE (PF) 50 MCG/ML SOLUTION: Performed by: ANESTHESIOLOGY

## 2023-05-26 PROCEDURE — 25010000002 MIDAZOLAM PER 1 MG: Performed by: ANESTHESIOLOGY

## 2023-05-26 PROCEDURE — 25010000002 CEFAZOLIN IN DEXTROSE 2-4 GM/100ML-% SOLUTION: Performed by: STUDENT IN AN ORGANIZED HEALTH CARE EDUCATION/TRAINING PROGRAM

## 2023-05-26 PROCEDURE — 25010000002 PROPOFOL 10 MG/ML EMULSION: Performed by: NURSE ANESTHETIST, CERTIFIED REGISTERED

## 2023-05-26 PROCEDURE — 25010000002 ROPIVACAINE PER 1 MG: Performed by: ANESTHESIOLOGY

## 2023-05-26 PROCEDURE — 25010000002 DEXAMETHASONE PER 1 MG: Performed by: ANESTHESIOLOGY

## 2023-05-26 RX ORDER — DEXMEDETOMIDINE HYDROCHLORIDE 100 UG/ML
INJECTION, SOLUTION INTRAVENOUS AS NEEDED
Status: DISCONTINUED | OUTPATIENT
Start: 2023-05-26 | End: 2023-05-26 | Stop reason: SURG

## 2023-05-26 RX ORDER — FAMOTIDINE 10 MG/ML
20 INJECTION, SOLUTION INTRAVENOUS ONCE
Status: COMPLETED | OUTPATIENT
Start: 2023-05-26 | End: 2023-05-26

## 2023-05-26 RX ORDER — LIDOCAINE HYDROCHLORIDE 20 MG/ML
INJECTION, SOLUTION EPIDURAL; INFILTRATION; INTRACAUDAL; PERINEURAL AS NEEDED
Status: DISCONTINUED | OUTPATIENT
Start: 2023-05-26 | End: 2023-05-26 | Stop reason: SURG

## 2023-05-26 RX ORDER — HYDROCODONE BITARTRATE AND ACETAMINOPHEN 5; 325 MG/1; MG/1
1 TABLET ORAL EVERY 6 HOURS PRN
Qty: 12 TABLET | Refills: 0 | Status: SHIPPED | OUTPATIENT
Start: 2023-05-26

## 2023-05-26 RX ORDER — FENTANYL CITRATE 50 UG/ML
50 INJECTION, SOLUTION INTRAMUSCULAR; INTRAVENOUS
Status: DISCONTINUED | OUTPATIENT
Start: 2023-05-26 | End: 2023-05-26 | Stop reason: HOSPADM

## 2023-05-26 RX ORDER — CEFAZOLIN SODIUM 2 G/100ML
2 INJECTION, SOLUTION INTRAVENOUS ONCE
Status: COMPLETED | OUTPATIENT
Start: 2023-05-26 | End: 2023-05-26

## 2023-05-26 RX ORDER — DEXAMETHASONE SODIUM PHOSPHATE 4 MG/ML
INJECTION, SOLUTION INTRA-ARTICULAR; INTRALESIONAL; INTRAMUSCULAR; INTRAVENOUS; SOFT TISSUE
Status: COMPLETED | OUTPATIENT
Start: 2023-05-26 | End: 2023-05-26

## 2023-05-26 RX ORDER — SODIUM CHLORIDE, SODIUM LACTATE, POTASSIUM CHLORIDE, CALCIUM CHLORIDE 600; 310; 30; 20 MG/100ML; MG/100ML; MG/100ML; MG/100ML
9 INJECTION, SOLUTION INTRAVENOUS CONTINUOUS
Status: DISCONTINUED | OUTPATIENT
Start: 2023-05-26 | End: 2023-05-26 | Stop reason: HOSPADM

## 2023-05-26 RX ORDER — MAGNESIUM HYDROXIDE 1200 MG/15ML
LIQUID ORAL AS NEEDED
Status: DISCONTINUED | OUTPATIENT
Start: 2023-05-26 | End: 2023-05-26 | Stop reason: HOSPADM

## 2023-05-26 RX ORDER — OXYCODONE AND ACETAMINOPHEN 7.5; 325 MG/1; MG/1
1 TABLET ORAL EVERY 4 HOURS PRN
Status: DISCONTINUED | OUTPATIENT
Start: 2023-05-26 | End: 2023-05-26 | Stop reason: HOSPADM

## 2023-05-26 RX ORDER — DROPERIDOL 2.5 MG/ML
0.62 INJECTION, SOLUTION INTRAMUSCULAR; INTRAVENOUS
Status: DISCONTINUED | OUTPATIENT
Start: 2023-05-26 | End: 2023-05-26 | Stop reason: HOSPADM

## 2023-05-26 RX ORDER — PROPOFOL 10 MG/ML
VIAL (ML) INTRAVENOUS AS NEEDED
Status: DISCONTINUED | OUTPATIENT
Start: 2023-05-26 | End: 2023-05-26 | Stop reason: SURG

## 2023-05-26 RX ORDER — MIDAZOLAM HYDROCHLORIDE 1 MG/ML
1 INJECTION INTRAMUSCULAR; INTRAVENOUS
Status: COMPLETED | OUTPATIENT
Start: 2023-05-26 | End: 2023-05-26

## 2023-05-26 RX ORDER — LIDOCAINE HYDROCHLORIDE AND EPINEPHRINE 10; 10 MG/ML; UG/ML
INJECTION, SOLUTION INFILTRATION; PERINEURAL AS NEEDED
Status: DISCONTINUED | OUTPATIENT
Start: 2023-05-26 | End: 2023-05-26 | Stop reason: HOSPADM

## 2023-05-26 RX ORDER — EPHEDRINE SULFATE 50 MG/ML
5 INJECTION, SOLUTION INTRAVENOUS ONCE AS NEEDED
Status: DISCONTINUED | OUTPATIENT
Start: 2023-05-26 | End: 2023-05-26 | Stop reason: HOSPADM

## 2023-05-26 RX ORDER — SODIUM CHLORIDE 0.9 % (FLUSH) 0.9 %
3 SYRINGE (ML) INJECTION EVERY 12 HOURS SCHEDULED
Status: DISCONTINUED | OUTPATIENT
Start: 2023-05-26 | End: 2023-05-26 | Stop reason: HOSPADM

## 2023-05-26 RX ORDER — LABETALOL HYDROCHLORIDE 5 MG/ML
5 INJECTION, SOLUTION INTRAVENOUS
Status: DISCONTINUED | OUTPATIENT
Start: 2023-05-26 | End: 2023-05-26 | Stop reason: HOSPADM

## 2023-05-26 RX ORDER — HYDROMORPHONE HYDROCHLORIDE 1 MG/ML
0.5 INJECTION, SOLUTION INTRAMUSCULAR; INTRAVENOUS; SUBCUTANEOUS
Status: DISCONTINUED | OUTPATIENT
Start: 2023-05-26 | End: 2023-05-26 | Stop reason: HOSPADM

## 2023-05-26 RX ORDER — HYDRALAZINE HYDROCHLORIDE 20 MG/ML
5 INJECTION INTRAMUSCULAR; INTRAVENOUS
Status: DISCONTINUED | OUTPATIENT
Start: 2023-05-26 | End: 2023-05-26 | Stop reason: HOSPADM

## 2023-05-26 RX ORDER — MIDAZOLAM HYDROCHLORIDE 1 MG/ML
1 INJECTION INTRAMUSCULAR; INTRAVENOUS
Status: DISCONTINUED | OUTPATIENT
Start: 2023-05-26 | End: 2023-05-26 | Stop reason: HOSPADM

## 2023-05-26 RX ORDER — SODIUM CHLORIDE 0.9 % (FLUSH) 0.9 %
3-10 SYRINGE (ML) INJECTION AS NEEDED
Status: DISCONTINUED | OUTPATIENT
Start: 2023-05-26 | End: 2023-05-26 | Stop reason: HOSPADM

## 2023-05-26 RX ORDER — FLUMAZENIL 0.1 MG/ML
0.2 INJECTION INTRAVENOUS AS NEEDED
Status: DISCONTINUED | OUTPATIENT
Start: 2023-05-26 | End: 2023-05-26 | Stop reason: HOSPADM

## 2023-05-26 RX ORDER — HYDROCODONE BITARTRATE AND ACETAMINOPHEN 7.5; 325 MG/1; MG/1
1 TABLET ORAL ONCE AS NEEDED
Status: DISCONTINUED | OUTPATIENT
Start: 2023-05-26 | End: 2023-05-26 | Stop reason: HOSPADM

## 2023-05-26 RX ORDER — DIPHENHYDRAMINE HYDROCHLORIDE 50 MG/ML
12.5 INJECTION INTRAMUSCULAR; INTRAVENOUS
Status: DISCONTINUED | OUTPATIENT
Start: 2023-05-26 | End: 2023-05-26 | Stop reason: HOSPADM

## 2023-05-26 RX ORDER — ONDANSETRON 2 MG/ML
4 INJECTION INTRAMUSCULAR; INTRAVENOUS ONCE AS NEEDED
Status: DISCONTINUED | OUTPATIENT
Start: 2023-05-26 | End: 2023-05-26 | Stop reason: HOSPADM

## 2023-05-26 RX ORDER — GLYCOPYRROLATE 0.2 MG/ML
INJECTION INTRAMUSCULAR; INTRAVENOUS AS NEEDED
Status: DISCONTINUED | OUTPATIENT
Start: 2023-05-26 | End: 2023-05-26 | Stop reason: SURG

## 2023-05-26 RX ORDER — IPRATROPIUM BROMIDE AND ALBUTEROL SULFATE 2.5; .5 MG/3ML; MG/3ML
3 SOLUTION RESPIRATORY (INHALATION) ONCE AS NEEDED
Status: DISCONTINUED | OUTPATIENT
Start: 2023-05-26 | End: 2023-05-26 | Stop reason: HOSPADM

## 2023-05-26 RX ORDER — LIDOCAINE HYDROCHLORIDE 10 MG/ML
0.5 INJECTION, SOLUTION EPIDURAL; INFILTRATION; INTRACAUDAL; PERINEURAL ONCE AS NEEDED
Status: DISCONTINUED | OUTPATIENT
Start: 2023-05-26 | End: 2023-05-26 | Stop reason: HOSPADM

## 2023-05-26 RX ORDER — PROMETHAZINE HYDROCHLORIDE 25 MG/1
25 SUPPOSITORY RECTAL ONCE AS NEEDED
Status: DISCONTINUED | OUTPATIENT
Start: 2023-05-26 | End: 2023-05-26 | Stop reason: HOSPADM

## 2023-05-26 RX ORDER — PROMETHAZINE HYDROCHLORIDE 25 MG/1
25 TABLET ORAL ONCE AS NEEDED
Status: DISCONTINUED | OUTPATIENT
Start: 2023-05-26 | End: 2023-05-26 | Stop reason: HOSPADM

## 2023-05-26 RX ORDER — NALOXONE HCL 0.4 MG/ML
0.2 VIAL (ML) INJECTION AS NEEDED
Status: DISCONTINUED | OUTPATIENT
Start: 2023-05-26 | End: 2023-05-26 | Stop reason: HOSPADM

## 2023-05-26 RX ORDER — ROPIVACAINE HYDROCHLORIDE 5 MG/ML
INJECTION, SOLUTION EPIDURAL; INFILTRATION; PERINEURAL
Status: COMPLETED | OUTPATIENT
Start: 2023-05-26 | End: 2023-05-26

## 2023-05-26 RX ADMIN — PROPOFOL 50 MG: 10 INJECTION, EMULSION INTRAVENOUS at 06:59

## 2023-05-26 RX ADMIN — MIDAZOLAM 1 MG: 1 INJECTION INTRAMUSCULAR; INTRAVENOUS at 06:12

## 2023-05-26 RX ADMIN — LIDOCAINE HYDROCHLORIDE 60 MG: 20 INJECTION, SOLUTION EPIDURAL; INFILTRATION; INTRACAUDAL; PERINEURAL at 06:59

## 2023-05-26 RX ADMIN — ROPIVACAINE HYDROCHLORIDE 30 ML: 5 INJECTION EPIDURAL; INFILTRATION; PERINEURAL at 06:28

## 2023-05-26 RX ADMIN — CEFAZOLIN SODIUM 2 G: 2 INJECTION, SOLUTION INTRAVENOUS at 06:49

## 2023-05-26 RX ADMIN — FAMOTIDINE 20 MG: 10 INJECTION INTRAVENOUS at 06:13

## 2023-05-26 RX ADMIN — DEXAMETHASONE SODIUM PHOSPHATE 4 MG: 4 INJECTION, SOLUTION INTRAMUSCULAR; INTRAVENOUS at 06:28

## 2023-05-26 RX ADMIN — SODIUM CHLORIDE, POTASSIUM CHLORIDE, SODIUM LACTATE AND CALCIUM CHLORIDE 9 ML/HR: 600; 310; 30; 20 INJECTION, SOLUTION INTRAVENOUS at 06:15

## 2023-05-26 RX ADMIN — PROPOFOL 100 MCG/KG/MIN: 10 INJECTION, EMULSION INTRAVENOUS at 07:00

## 2023-05-26 RX ADMIN — DEXMEDETOMIDINE 8 MCG: 100 INJECTION, SOLUTION, CONCENTRATE INTRAVENOUS at 06:57

## 2023-05-26 RX ADMIN — DEXMEDETOMIDINE 8 MCG: 100 INJECTION, SOLUTION, CONCENTRATE INTRAVENOUS at 07:19

## 2023-05-26 RX ADMIN — FENTANYL CITRATE 50 MCG: 50 INJECTION, SOLUTION INTRAMUSCULAR; INTRAVENOUS at 06:31

## 2023-05-26 RX ADMIN — MIDAZOLAM 1 MG: 1 INJECTION INTRAMUSCULAR; INTRAVENOUS at 06:23

## 2023-05-26 RX ADMIN — GLYCOPYRROLATE 0.2 MG: 0.2 INJECTION INTRAMUSCULAR; INTRAVENOUS at 07:02

## 2023-05-26 NOTE — ANESTHESIA POSTPROCEDURE EVALUATION
Patient: Kristin Jones    Procedure Summary     Date: 05/26/23 Room / Location:  [facility] [address] [place] [address] [facility]    Anesthesia Start: 0654 Anesthesia Stop: 0814    Procedure: RIGHT CUBITAL TUNNEL RELEASE (Right) Diagnosis:     Surgeons: Damon Blackwood MD Provider: Andry Dumont MD    Anesthesia Type: regional, MAC ASA Status: 2          Anesthesia Type: regional, MAC    Vitals  Vitals Value Taken Time   BP 97/58 05/26/23 0841   Temp     Pulse 67 05/26/23 0841   Resp 16 05/26/23 0841   SpO2 96 % 05/26/23 0834           Post Anesthesia Care and Evaluation    Patient location during evaluation: bedside  Patient participation: complete - patient participated  Level of consciousness: awake and alert  Pain management: adequate    Airway patency: patent  Anesthetic complications: No anesthetic complications  PONV Status: controlled  Cardiovascular status: blood pressure returned to baseline and acceptable  Respiratory status: acceptable  Hydration status: acceptable

## 2023-05-26 NOTE — INTERVAL H&P NOTE
H&P reviewed. The patient was examined and there are no changes to the H&P.    Damon Blackwood MD, PhD  Orthopaedic & Hand Surgery  [facility]  [phone] - [place] [facility]  [phone] - [facility]

## 2023-05-26 NOTE — OP NOTE
Orthopaedic & Hand Surgery  Cubital Tunnel Release Note  Dr. Damon Blackwood  [phone]    PATIENT NAME: Kristin Jnoes  MRN: [id number]  : 1995 AGE: 27 y.o. GENDER: female  DATE OF OPERATION: 2023  PREOPERATIVE DIAGNOSIS: Right Cubital Tunnel Syndrome  POSTOPERATIVE DIAGNOSIS: Right Cubital Tunnel Syndrome  OPERATION PERFORMED: Right in situ Cubital Tunnel Release (CPT 58051)  SURGEON: Damno Blackwood MD, PhD  ANESTHESIA: Local and General  ASSISTANT: None  ESTIMATED BLOOD LOSS: <5 ml  SPONGE AND NEEDLE COUNT: Correct    SPECIMENS:   • None    PERTINENT FINDINGS:   • Anconeus epitrochlearis muscle present. The portion overlying the ulnar nerve was surgically excised  • No evidence of subluxation of the ulnar nerve on flexion and extension of the elbow after release.    TOURNIQUET TIME:   • 26Minutes    INDICATIONS: This patient is noted to have cubital tunnel tunnel syndrome in the affected arm and hand that failed nonoperative treatment. The risks of surgery were discussed and included the risk of anesthesia, infection, wound healing problems, damage to neurovascular structures, incomplete relief or recurrence, loss of range of motion, the need for further procedures, medical complications, and others. No guarantees were made. The patient voiced understanding and a desire to proceed with surgery. Verbal and written consent were obtained.    DETAILS OF PROCEDURE:  The patient was met in the preoperative area. The site was marked. The consent and H&P were reviewed. The patient was then transferred to the operative suite.    The patient was positioned supine with the right arm extended on an arm board. General anesthesia was induced. The hand and arm were prepped and draped in normal sterile fashion which included alcohol, chlorhexidine and multiple layers of sterile draping.     A timeout was performed confirming the site and side of surgery, the antibiotic and allergy status and the  presence of the necessary surgical equipment.     A sterile tourniquet was placed high on the arm, the extremity was then exsanguinated and the tourniquet was inflated.The skin was incised with a number 15 blade. The subcutaneous tissue was divided carefully.     The fascial layer, triceps tendon as well as the posterior aspect of the flexor pronator mass were identified and a medial full-thickness fasciocutaneous flap was raised uncovering the medial epicondyle and the ulnar nerve behind the medial intermuscular septum. The nerve was then traced distally into the area of Chua's ligament. The ligament appeared to be very stout and nerve appeared to be quite compressed in that area. In addition, there was an anconeus epitrochlearis muscle overlying the ulnar nerve. The muscle belly directly over the ulnar nerve was surgically excised and vascular channels through it were coagulated with the bipolar, taking care not to injure branches of the ulnar nerve.    The Chua's ligament was now released and there appeared to be some narrowing of the nerve with some post-stenotic prominence of the nerve. The nerve was then traced out into the flexor pronator mass between the two heads of the FCU tendon about a distance of 7 to 9 cm. Fascial bands were divided carefully. After this, the nerve was traced proximally behind the medial intermuscular septum approximately 10 cm.      The nerve was then taken through range of motion, and appeared to have no adherences. There appeared to be no osteophytes or synovitic tissue from the elbow joint and no tendency of the nerve to subluxate with range of motion.  Local anesthetic containing epinephrine was instilled within the subcutaneous tissues for hemostasis purposes.    The tourniquet was then deflated at 26 minutes. Hemostasis was obtained using a combination of direct pressure and bipolar electrocautery. The wound was copiously irrigated then closed in a layered fashion. Sterile  dressing was applied and secured with an Ace bandage. Anesthesia was reversed with uneventful emergence, and the patient was taken to recovery in stable condition. The patient tolerated the procedure well with no immediate complications noted. The estimated blood loss was minimal. No specimens to pathology. Final sponge and instrument count were reported to me as correct.    POSTOPERATIVE PLAN:  • The patient is to keep the hand elevated and use the hand lightly.  • Remove dressings on postoperative day 4 and cover the wound with dry dressings  • Okay to wait and cleanse the wound after 4 days with clean water and soap  • Sutures to be removed on the first postoperative visit  • Hand may be immersed in scar massage initiated 3 days after suture removal  • Hand may be used gently and progressively for the next 4 weeks postoperative and then normal use and return to regular activities allowed. Anticipate some soreness in the medial elbow with pressure and extremes of range of motion for up to 4 months    Damon Blackwood MD, PhD  Orthopaedic & Hand Surgery  [facility]  [phone] - [place] [facility]  [phone] - [facility]

## 2023-05-26 NOTE — ANESTHESIA PROCEDURE NOTES
Peripheral Block      Patient reassessed immediately prior to procedure    Patient location during procedure: pre-op  Start time: 5/26/2023 6:24 AM  Stop time: 5/26/2023 6:28 AM  Reason for block: at surgeon's request and post-op pain management  Performed by  Anesthesiologist: Andry Dumont MD  Preanesthetic Checklist  Completed: patient identified, IV checked, site marked, risks and benefits discussed, surgical consent, monitors and equipment checked, pre-op evaluation and timeout performed  Prep:  Sterile barriers:cap, gloves, mask, alcohol skin prep and washed/disinfected hands  Prep: ChloraPrep  Patient monitoring: blood pressure monitoring, EKG and continuous pulse oximetry  Procedure    Sedation: yes    Guidance:ultrasound guided    ULTRASOUND INTERPRETATION.  Using ultrasound guidance a 21 G gauge needle was placed in close proximity to the brachial plexus nerve, at which point, under ultrasound guidance anesthetic was injected in the area of the nerve and spread of the anesthesia was seen on ultrasound in close proximity thereto.  There were no abnormalities seen on ultrasound; a digital image was taken; and the patient tolerated the procedure with no complications. Images:still images obtained, printed/placed on chart    Laterality:right  Block Type:supraclavicular  Injection Technique:single-shot  Needle Type:short-bevel  Needle Gauge:21 G  Loss of resistance: normal.    Medications Used: ropivacaine (NAROPIN) 0.5 % injection - Injection   30 mL - 5/26/2023 6:28:00 AM  dexamethasone (DECADRON) injection - Injection   4 mg - 5/26/2023 6:28:00 AM      Medications  Comment:Ultrasound Interpretation:  Ultrasound guidance utilized for visualization of needle approach to brachial plexus at supraclavicular level and verification of local anesthetic disbursement to surrounding tissues. Photo printed and placed on chart for reference.    Post Assessment  Injection Assessment: negative aspiration for  heme, no paresthesia on injection and incremental injection  Patient Tolerance:comfortable throughout block  Complications:no

## 2023-05-26 NOTE — ANESTHESIA PREPROCEDURE EVALUATION
Anesthesia Evaluation     history of anesthetic complications (PDPH):  NPO Solid Status: > 8 hours  NPO Liquid Status: > 2 hours           Airway   Mallampati: I  Neck ROM: full  no difficulty expected  Dental - normal exam     Pulmonary - normal exam   (+) a smoker Current, sleep apnea,   (-) COPD, asthma    PE comment: nonlabored  Cardiovascular - normal exam    Rhythm: regular  Rate: normal    (+) valvular problems/murmurs (leaky heart valve),   (-) hypertension, past MI, CAD, dysrhythmias, angina      Neuro/Psych  (+) headaches, tremors (R hand), numbness, psychiatric history Anxiety and Depression,    (-) seizures, TIA, CVA  GI/Hepatic/Renal/Endo - negative ROS   (-) GERD, liver disease, no renal disease, diabetes, no thyroid disorder    Musculoskeletal     (+) arthralgias, back pain,   Abdominal    Substance History      OB/GYN          Other   arthritis,                      Anesthesia Plan    ASA 2     MAC and regional     (Discussed R/B/A including possibility of conversion to GA if needed.)  intravenous induction     Anesthetic plan, risks, benefits, and alternatives have been provided, discussed and informed consent has been obtained with: patient.        CODE STATUS:

## 2023-08-18 ENCOUNTER — OFFICE VISIT (OUTPATIENT)
Dept: OBSTETRICS AND GYNECOLOGY | Facility: CLINIC | Age: 28
End: 2023-08-18
Payer: COMMERCIAL

## 2023-08-18 VITALS
HEIGHT: 67 IN | DIASTOLIC BLOOD PRESSURE: 79 MMHG | BODY MASS INDEX: 24.55 KG/M2 | WEIGHT: 156.4 LBS | SYSTOLIC BLOOD PRESSURE: 121 MMHG

## 2023-08-18 DIAGNOSIS — N93.9 ABNORMAL UTERINE BLEEDING (AUB): Primary | ICD-10-CM

## 2023-08-18 RX ORDER — AMITRIPTYLINE HYDROCHLORIDE 10 MG/1
TABLET, FILM COATED ORAL
COMMUNITY
Start: 2023-08-04

## 2023-08-18 RX ORDER — MEDROXYPROGESTERONE ACETATE 10 MG/1
10 TABLET ORAL DAILY
Qty: 10 TABLET | Refills: 0 | Status: SHIPPED | OUTPATIENT
Start: 2023-08-18 | End: 2023-08-28

## 2023-08-18 NOTE — PROGRESS NOTES
SUBJECTIVE:   Chief Complaint   Patient presents with    Menstrual Problem     Patient having abnormal bleeding, LMP 23 then started bleeding again on . Heavy and light bleeding.         Kristin Jones is a 27 y.o.  who presents for 8 days of AUB. Sometimes bleeding through a panty liner in 15 min.  She had an 8 day period (usually it is 5 days), then 8 days of no bleeding, then started this 8 day bleeding cycle. No pain or fever did notice an ammonia smell. Using condoms for contraception.    Past Medical History:   Diagnosis Date    Anxiety     Arthritis     RIGHT ELBOW    Astigmatism     Carpal tunnel syndrome     bilateral    Chlamydia     Cubital tunnel syndrome, right     Decreased ROM of wrist     RIGHT    Depression     Eczema     Hypoglycemia     Leaky heart valve     ASYMPTOMATIC, FOUND ON ECHO    Migraine     Presence of surgical incision     SLIGHTLY OPEN RIGHT WRIST PT STATES FROM PHYSICAL THERAPY    RLS (restless legs syndrome)     Sleep apnea     MILD NO MACHINE    Spinal headache 20    2012: spinal tap, : from epidural    Stress incontinence in female     Tremor     RIGHT HAND     Past Surgical History:   Procedure Laterality Date    CARPAL TUNNEL RELEASE Right 2022    Procedure: RIGHT CARPAL TUNNEL RELEASE;  Surgeon: Damon Blackwood MD;  [place] [facility] [place] [facility] [place]: [place]opedics;  Laterality: Right;    CARPAL TUNNEL RELEASE Right 2022    Procedure: RIGHT WRIST REVISION CARPAL TUNNEL RELEASE;  Surgeon: Damon Blackwood MD;  [place] [facility] [place] [facility] [place]  Service: [place]opedics;  Laterality: Right;    CARPAL TUNNEL RELEASE WITH CUBITAL TUNNEL RELEASE Left 2022    Procedure: LEFT CARPAL TUNNEL RELEASE AND CUBITAL TUNNEL RELEASE;  Surgeon: Damon Blackwood MD;  Location: [facility];  Service: Orthopedics;  Laterality: Left;    CUBITAL TUNNEL RELEASE Right 2023    Procedure: RIGHT CUBITAL TUNNEL RELEASE;  Surgeon: Damon Blackwood  MD ERIN;  [place]  [facility] [place];  Service: Orthopedics;  Laterality: Right;    WISDOM TOOTH EXTRACTION  2015     OB History    Para Term  AB Living   1 1 1     1   SAB IAB Ectopic Molar Multiple Live Births             1      # Outcome Date GA Lbr Ananda/2nd Weight Sex Delivery Anes PTL Lv   1 Term 20 39w6d 06:20  00:48 3711 g (8 lb 2.9 oz) M Vag-Spont Spinal N CORBIN      Birth Comments: Infant Scale 2      Social History     Tobacco Use    Smoking status: Every Day     Packs/day: 1.00     Years: 10.00     Pack years: 10.00     Types: Cigarettes    Smokeless tobacco: Never   Vaping Use    Vaping Use: Former   Substance Use Topics    Alcohol use: Yes     Comment: rarely    Drug use: Yes     Types: Marijuana     Comment: USES OCCASIONALLY     Family History   Problem Relation Age of Onset    Breast cancer Mother         survivor    Hypertension Father     Liver cancer Father     Lung cancer Father     Bone cancer Father     Cervical cancer Sister         40's    Diabetes Maternal Aunt     Melanoma Maternal Uncle     Prostate cancer Paternal Uncle     Cancer Maternal Grandmother         mouth    Lung cancer Maternal Grandmother         double    Colon cancer Paternal Grandmother         80's    Prostate cancer Paternal Uncle     Prostate cancer Paternal Uncle     Ovarian cancer Neg Hx     Uterine cancer Neg Hx     Pulmonary embolism Neg Hx     Deep vein thrombosis Neg Hx     Malig Hyperthermia Neg Hx      Current Outpatient Medications on File Prior to Visit   Medication Sig Dispense Refill    amitriptyline (ELAVIL) 10 MG tablet       cholecalciferol (VITAMIN D3) 25 MCG (1000 UT) tablet Take 1 tablet by mouth Daily.      ibuprofen (ADVIL,MOTRIN) 200 MG tablet Take 1 tablet by mouth Every 6 (Six) Hours As Needed for Mild Pain.      [DISCONTINUED] HYDROcodone-acetaminophen (NORCO) 5-325 MG per tablet Take 1 tablet by mouth Every 6 (Six) Hours As Needed for Severe Pain. 12 tablet 0     No current  "facility-administered medications on file prior to visit.     Allergies   Allergen Reactions    Doxycycline GI Intolerance    Kiwi Extract Swelling     Oral swelling    Sulfamethoxazole-Trimethoprim GI Intolerance    Nickel Dermatitis, Itching and Rash        Review of Systems      OBJECTIVE:   Vitals:    08/18/23 1402   BP: 121/79   Weight: 70.9 kg (156 lb 6.4 oz)   Height: 170.2 cm (67.01\")      Physical Exam  Exam conducted with a chaperone present.   Constitutional:       Appearance: She is well-developed.   HENT:      Head: Normocephalic and atraumatic.   Eyes:      General: No scleral icterus.  Cardiovascular:      Rate and Rhythm: Normal rate.   Pulmonary:      Effort: Pulmonary effort is normal. No respiratory distress.   Abdominal:      General: There is no distension.      Palpations: Abdomen is soft.      Tenderness: There is no abdominal tenderness. There is no guarding.   Genitourinary:     Labia:         Right: No rash, tenderness or lesion.         Left: No rash, tenderness or lesion.       Vagina: Bleeding (scant) present. No vaginal discharge or lesions.      Cervix: No cervical motion tenderness or friability.      Uterus: Normal. Not enlarged and not tender.       Adnexa: Right adnexa normal.        Right: No mass or tenderness.          Left: No mass or tenderness.     Musculoskeletal:      Cervical back: Normal range of motion.   Skin:     General: Skin is warm and dry.   Neurological:      Mental Status: She is alert and oriented to person, place, and time.   Psychiatric:         Behavior: Behavior normal.       ASSESSMENT/PLAN:     ICD-10-CM ICD-9-CM   1. Abnormal uterine bleeding (AUB)  N93.9 626.9       Reviewed anatomic and hormonal causes of AUB.  She would like to get labs today and will return if not improved for ultrasound.    Rx sent for provera for now, declines contraception at this time    Orders Placed This Encounter   Procedures    POC Pregnancy, Urine     Order Specific Question: "   Release to patient     Answer:   Routine Release [[id number]]       No follow-ups on file.

## 2023-08-19 LAB
ERYTHROCYTE [DISTWIDTH] IN BLOOD BY AUTOMATED COUNT: 13.1 % (ref 12.3–15.4)
HCT VFR BLD AUTO: 42.7 % (ref 34–46.6)
HGB BLD-MCNC: 14.2 G/DL (ref 12–15.9)
MCH RBC QN AUTO: 29.1 PG (ref 26.6–33)
MCHC RBC AUTO-ENTMCNC: 33.3 G/DL (ref 31.5–35.7)
MCV RBC AUTO: 87.5 FL (ref 79–97)
PLATELET # BLD AUTO: 276 10*3/MM3 (ref 140–450)
RBC # BLD AUTO: 4.88 10*6/MM3 (ref 3.77–5.28)
TSH SERPL DL<=0.005 MIU/L-ACNC: 0.68 UIU/ML (ref 0.27–4.2)
WBC # BLD AUTO: 9.4 10*3/MM3 (ref 3.4–10.8)

## 2023-12-08 ENCOUNTER — TELEPHONE (OUTPATIENT)
Dept: OBSTETRICS AND GYNECOLOGY | Facility: CLINIC | Age: 28
End: 2023-12-08

## 2023-12-08 ENCOUNTER — OFFICE VISIT (OUTPATIENT)
Dept: OBSTETRICS AND GYNECOLOGY | Facility: CLINIC | Age: 28
End: 2023-12-08
Payer: COMMERCIAL

## 2023-12-08 VITALS
HEIGHT: 67 IN | WEIGHT: 160.6 LBS | SYSTOLIC BLOOD PRESSURE: 98 MMHG | BODY MASS INDEX: 25.21 KG/M2 | DIASTOLIC BLOOD PRESSURE: 64 MMHG

## 2023-12-08 DIAGNOSIS — N93.9 ABNORMAL UTERINE BLEEDING (AUB): Primary | ICD-10-CM

## 2023-12-08 LAB
B-HCG UR QL: NEGATIVE
EXPIRATION DATE: NORMAL
INTERNAL NEGATIVE CONTROL: NORMAL
INTERNAL POSITIVE CONTROL: NORMAL
Lab: NORMAL

## 2023-12-08 NOTE — TELEPHONE ENCOUNTER
Caller: Kristin Jones    Relationship: Self    Best call back number: [phone]      Who are you requesting to speak with (clinical staff, DR. RIVERA      What was the call regarding: PATIENT ADVISED THAT SHE IS BLEEDING HEAVIER THAN NORMAL

## 2023-12-08 NOTE — TELEPHONE ENCOUNTER
Patient having abnormal uterine bleeding during ovulation time. Happened in August and is happening again today, started bleeding 2 days ago. Would like to be seen for AUB today. She is on at your 1:15 opening for today.

## 2023-12-08 NOTE — PROGRESS NOTES
Chief Complaint   Patient presents with    Follow-up     Patient is here today for a follow up appointment following an US for AUB.      SUBJECTIVE:     Kritsin Jones is a 28 y.o.  who presents with c/o abnormal vaginal bleeding. LMP was 23, she is having vaginal bleeding X3 days, this started as spotting, but is now heavy enough to require a pad, but she is not saturating a pad an hour. Bleeding is accompanied by cramping. This is a new problem. This also occurred in 2023 at that time she had normal TSH, CBC, and negative NuSwab. She was treated with provera with improvement in bleeding. She typically has menses once a month. Using condoms for contraception. Has declined contraceptives in the past to manage. She is sexually active, denies new partners. TVUS completed prior to appointment.    She is a patient of Dr. Fuller.   Past Medical History:   Diagnosis Date    Anxiety     Arthritis     RIGHT ELBOW    Astigmatism     Carpal tunnel syndrome     bilateral    Chlamydia     Complex regional pain syndrome affecting both upper arms     Cubital tunnel syndrome, right     Decreased ROM of wrist     RIGHT    Depression     Eczema     Hypoglycemia     Leaky heart valve     ASYMPTOMATIC, FOUND ON ECHO    Migraine     Presence of surgical incision     SLIGHTLY OPEN RIGHT WRIST PT STATES FROM PHYSICAL THERAPY    RLS (restless legs syndrome)     Sleep apnea     MILD NO MACHINE    Spinal headache , 20: spinal tap, : from epidural    Stress incontinence in female     Tremor     RIGHT HAND      Past Surgical History:   Procedure Laterality Date    CARPAL TUNNEL RELEASE Right 2022    Procedure: RIGHT CARPAL TUNNEL RELEASE;  Surgeon: Damon Blackwood MD;  [facility]: [facility] [place] [facility] [place];  Service: Orthopedics;  Laterality: Right;    CARPAL TUNNEL RELEASE Right 2022    Procedure: RIGHT WRIST REVISION CARPAL TUNNEL RELEASE;  Surgeon: Damon Blackwood MD;  Location:   "[facility];  Service: Orthopedics;  Laterality: Right;    CARPAL TUNNEL RELEASE WITH CUBITAL TUNNEL RELEASE Left 12/30/2022    Procedure: LEFT CARPAL TUNNEL RELEASE AND CUBITAL TUNNEL RELEASE;  Surgeon: Damon Blackwood MD;  Location:  [facility];  Service: Orthopedics;  Laterality: Left;    CUBITAL TUNNEL RELEASE Right 5/26/2023    Procedure: RIGHT CUBITAL TUNNEL RELEASE;  Surgeon: Damon Blackwood MD;  Location:  [facility];  Service: Orthopedics;  Laterality: Right;    WISDOM TOOTH EXTRACTION  12/2015        Review of Systems   Constitutional:  Negative for chills, fatigue and fever.   Gastrointestinal:  Negative for abdominal distention and abdominal pain.   Genitourinary:  Positive for menstrual problem, pelvic pain and vaginal bleeding. Negative for dyspareunia, dysuria, vaginal discharge and vaginal pain.       OBJECTIVE:   Vitals:    12/08/23 1339   BP: 98/64   Weight: 72.8 kg (160 lb 9.6 oz)   Height: 170.2 cm (67.01\")        Physical Exam  Constitutional:       General: She is not in acute distress.     Appearance: Normal appearance. She is not ill-appearing, toxic-appearing or diaphoretic.   Genitourinary:      Bladder and urethral meatus normal.      No lesions in the vagina.      Right Labia: No rash, tenderness, lesions, skin changes or Bartholin's cyst.     Left Labia: No tenderness, lesions, skin changes, Bartholin's cyst or rash.     No labial fusion noted.      No inguinal adenopathy present in the right or left side.     Vaginal bleeding (small) present.      No vaginal discharge, erythema, tenderness, ulceration or granulation tissue.      No vaginal prolapse present.     No vaginal atrophy present.       Right Adnexa: not tender, not full, not palpable, no mass present and not absent.     Left Adnexa: not tender, not full, not palpable, no mass present and not absent.     No cervical motion tenderness, discharge, friability, lesion, polyp, nabothian cyst or eversion.      Uterus is not " enlarged, fixed, tender, irregular or prolapsed.      No uterine mass detected.  Abdominal:      General: There is no distension.      Palpations: Abdomen is soft. There is no mass.      Tenderness: There is no abdominal tenderness. There is no guarding.      Hernia: No hernia is present. There is no hernia in the left inguinal area or right inguinal area.   Lymphadenopathy:      Lower Body: No right inguinal adenopathy. No left inguinal adenopathy.   Neurological:      Mental Status: She is alert.   Vitals and nursing note reviewed.       Brief Urine Lab Results  (Last result in the past 365 days)        Color   Clarity   Blood   Leuk Est   Nitrite   Protein   CREAT   Urine HCG        12/08/23 1348               Negative             Assessment/Plan    Diagnoses and all orders for this visit:    1. Abnormal uterine bleeding (AUB) (Primary)  -     POC Pregnancy, Urine  -     NuSwab VG+ - Swab, Vagina    Discussed possible causes of AUB  TVUS: Uterus measures 5.52 x 3.81 x 2.80 cm, ET 0.29 cm, Normal uterus with no obvious endometrial mass noted, Left simple cyst measuring 4.43 x 2.38 cm, The right ovary is normal in appearance, No free fluid noted  Vaginal cultures obtained  Recent normal TSH and CBC  Bleeding is light at this time  Discussed EMB to further evaluate. She declines at this time  Discussed contraceptives to manage, she is considering some form of contraception, possibly an IUD.   She will continue to monitor at this time  Advised to go to ED if saturating a pad an hour for 2 consecutive hours.     Return if symptoms worsen or fail to improve.    I spent 22 minutes caring for Kristin on this date of service. This time includes time spent by me in the following activities: preparing for the visit, reviewing tests, obtaining and/or reviewing a separately obtained history, performing a medically appropriate examination and/or evaluation, counseling and educating the patient/family/caregiver, ordering  medications, tests, or procedures, referring and communicating with other health care professionals, documenting information in the medical record, and independently interpreting results and communicating that information with the patient/family/caregiver    MICHAEL Johnson  12/8/2023  14:01 EST

## 2023-12-12 ENCOUNTER — TELEPHONE (OUTPATIENT)
Dept: OBSTETRICS AND GYNECOLOGY | Facility: CLINIC | Age: 28
End: 2023-12-12
Payer: COMMERCIAL

## 2023-12-12 DIAGNOSIS — N83.202 LEFT OVARIAN CYST: Primary | ICD-10-CM

## 2024-01-24 ENCOUNTER — OFFICE VISIT (OUTPATIENT)
Dept: OBSTETRICS AND GYNECOLOGY | Facility: CLINIC | Age: 29
End: 2024-01-24
Payer: COMMERCIAL

## 2024-01-24 VITALS
HEIGHT: 67 IN | WEIGHT: 158.2 LBS | SYSTOLIC BLOOD PRESSURE: 115 MMHG | HEART RATE: 73 BPM | DIASTOLIC BLOOD PRESSURE: 77 MMHG | BODY MASS INDEX: 24.83 KG/M2

## 2024-01-24 DIAGNOSIS — Z01.419 WELL WOMAN EXAM WITH ROUTINE GYNECOLOGICAL EXAM: Primary | ICD-10-CM

## 2024-01-24 NOTE — PROGRESS NOTES
Chief Complaint   Patient presents with    Annual Exam     Last ae and pap: 2023 NIL HPV-          Kristin Jones is a 28 y.o.  who presents for an annual examination     Pap history:  Last pap: 2023 NIL; 2020 NIL  Prior abnormal paps: no  STDs  Sexually active: yes  History of STDs: yes, chlamydia  Has had HPV vaccine: yes  Contraception:  Condoms  Periods are regular.       Screening for BRCA-   Is patient's family history significant for BRCA risk factors? yes, reports through PCP and was negative  Mom had breast cancer at young age    Past Medical History:   Diagnosis Date    Anxiety     Arthritis     RIGHT ELBOW    Astigmatism     Carpal tunnel syndrome     bilateral    Chlamydia     Complex regional pain syndrome affecting both upper arms     Cubital tunnel syndrome, right     Decreased ROM of wrist     RIGHT    Depression     Eczema     Hypoglycemia     Leaky heart valve     ASYMPTOMATIC, FOUND ON ECHO    Migraine     Presence of surgical incision     SLIGHTLY OPEN RIGHT WRIST PT STATES FROM PHYSICAL THERAPY    RLS (restless legs syndrome)     Sleep apnea     MILD NO MACHINE    Spinal headache , 20    2012: spinal tap, : from epidural    Stress incontinence in female     Tremor     RIGHT HAND     Past Surgical History:   Procedure Laterality Date    CARPAL TUNNEL RELEASE Right 2022    Procedure: RIGHT CARPAL TUNNEL RELEASE;  Surgeon: Damon Blackwood MD;  Location: [facility];  Service: Orthopedics;  Laterality: Right;    CARPAL TUNNEL RELEASE Right 2022    Procedure: RIGHT WRIST REVISION CARPAL TUNNEL RELEASE;  Surgeon: Damon Blackwood MD;  Location: [facility];  Service: Orthopedics;  Laterality: Right;    CARPAL TUNNEL RELEASE WITH CUBITAL TUNNEL RELEASE Left 2022    Procedure: LEFT CARPAL TUNNEL RELEASE AND CUBITAL TUNNEL RELEASE;  Surgeon: Damon Blackwood MD;  Location: [facility];  Service: Orthopedics;  Laterality: Left;     CUBITAL TUNNEL RELEASE Right 2023    Procedure: RIGHT CUBITAL TUNNEL RELEASE;  Surgeon: Damon Blackwood MD;  Location: [facility];  Service: Orthopedics;  Laterality: Right;    WISDOM TOOTH EXTRACTION  2015     OB History    Para Term  AB Living   1 1 1     1   SAB IAB Ectopic Molar Multiple Live Births             1      # Outcome Date GA Lbr Ananda/2nd Weight Sex Delivery Anes PTL Lv   1 Term 20 39w6d 06:20  00:48 3711 g (8 lb 2.9 oz) M Vag-Spont Spinal N CORBIN      Birth Comments: Infant Scale 2      Social History     Tobacco Use    Smoking status: Every Day     Packs/day: 1.00     Years: 10.00     Additional pack years: 0.00     Total pack years: 10.00     Types: Cigarettes    Smokeless tobacco: Never   Vaping Use    Vaping Use: Former   Substance Use Topics    Alcohol use: Yes     Comment: rarely    Drug use: Yes     Types: Marijuana     Comment: USES OCCASIONALLY     Family History   Problem Relation Age of Onset    Breast cancer Mother 40        survivor    Hypertension Father     Liver cancer Father     Lung cancer Father     Bone cancer Father     Cervical cancer Sister         40's    Diabetes Maternal Aunt     Melanoma Maternal Uncle     Prostate cancer Paternal Uncle     Cancer Maternal Grandmother         mouth    Lung cancer Maternal Grandmother         double    Colon cancer Paternal Grandmother         80's    Prostate cancer Paternal Uncle     Prostate cancer Paternal Uncle     Ovarian cancer Neg Hx     Uterine cancer Neg Hx     Pulmonary embolism Neg Hx     Deep vein thrombosis Neg Hx     Malig Hyperthermia Neg Hx      Current Outpatient Medications on File Prior to Visit   Medication Sig Dispense Refill    cholecalciferol (VITAMIN D3) 25 MCG (1000 UT) tablet Take 1 tablet by mouth Daily.      ibuprofen (ADVIL,MOTRIN) 200 MG tablet Take 1 tablet by mouth Every 6 (Six) Hours As Needed for Mild Pain.      [DISCONTINUED] amitriptyline (ELAVIL) 10 MG tablet        No  "current facility-administered medications on file prior to visit.     Allergies   Allergen Reactions    Doxycycline GI Intolerance    Kiwi Extract Swelling     Oral swelling    Sulfamethoxazole-Trimethoprim GI Intolerance    Nickel Dermatitis, Itching and Rash        Review of Systems  See HPI    OBJECTIVE:   Vitals:    01/24/24 1010   BP: 115/77   Pulse: 73   Weight: 71.8 kg (158 lb 3.2 oz)   Height: 170.2 cm (67.01\")      Physical Exam  Exam conducted with a chaperone present.   Constitutional:       General: She is not in acute distress.     Appearance: She is well-developed. She is not diaphoretic.   HENT:      Head: Normocephalic and atraumatic.   Neck:      Thyroid: No thyromegaly.      Trachea: No tracheal deviation.   Cardiovascular:      Rate and Rhythm: Normal rate.      Heart sounds: Normal heart sounds. No murmur heard.     No friction rub. No gallop.   Pulmonary:      Effort: Pulmonary effort is normal. No respiratory distress.      Breath sounds: Normal breath sounds.   Chest:      Chest wall: No tenderness.   Breasts:     Right: No inverted nipple, mass, nipple discharge, skin change or tenderness.      Left: No inverted nipple, mass, nipple discharge, skin change or tenderness.   Abdominal:      General: There is no distension.      Palpations: Abdomen is soft. There is no mass.      Tenderness: There is no abdominal tenderness.   Genitourinary:     General: Normal vulva.      Labia:         Right: No rash, lesion or injury.         Left: No rash, lesion or injury.       Vagina: No vaginal discharge, tenderness or bleeding.      Cervix: No cervical motion tenderness, discharge or friability.      Uterus: Not deviated, not enlarged, not fixed and not tender.       Adnexa:         Right: No mass, tenderness or fullness.          Left: No mass, tenderness or fullness.     Musculoskeletal:         General: No deformity. Normal range of motion.      Comments: CRPS in right, right radial atrophy "   Lymphadenopathy:      Cervical: No cervical adenopathy.   Skin:     General: Skin is warm and dry.      Findings: No rash.      Comments: Well healed scars on both wrists   Neurological:      Mental Status: She is alert and oriented to person, place, and time.   Psychiatric:         Behavior: Behavior normal.         Thought Content: Thought content normal.         Judgment: Judgment normal.         ASSESSMENT/PLAN:     Annual well woman exam:  Cervical cancer screening:    Denies cervical dysplasia in past   HPV vaccination completed   The patient is due for a pap in 2026   Screening guidelines discussed with patient  Breast cancer screening:    Clinical breast exam recommended for age 20-39 years every 1-3 years   Mammogram recommended starting age 40. Last US in Aug 2020 normal, negative. Clinical exam benign   Breast self awareness encouraged  STD Screening   Testing declined.    Contraception :   Condoms    Family history    Reports negative testing with PCP  Healthy lifestyle counseling:   return for routine annual checkups  - reviewed US. 4cm left ovarian cyst has likely resolved and now with new 2cm follicle.  She reports her period in January had normal menses (1/month).        BMI Counseling  Body mass index is 24.77 kg/m².       Return if symptoms worsen or fail to improve, for Annual physical.

## 2024-06-05 ENCOUNTER — PRE-ADMISSION TESTING (OUTPATIENT)
Dept: PREADMISSION TESTING | Facility: HOSPITAL | Age: 29
End: 2024-06-05
Payer: COMMERCIAL

## 2024-06-05 ENCOUNTER — HOSPITAL ENCOUNTER (OUTPATIENT)
Dept: GENERAL RADIOLOGY | Facility: HOSPITAL | Age: 29
Discharge: HOME | End: 2024-06-05
Payer: COMMERCIAL

## 2024-06-05 VITALS
WEIGHT: 162.5 LBS | HEIGHT: 68 IN | SYSTOLIC BLOOD PRESSURE: 101 MMHG | RESPIRATION RATE: 16 BRPM | BODY MASS INDEX: 24.63 KG/M2 | OXYGEN SATURATION: 100 % | DIASTOLIC BLOOD PRESSURE: 58 MMHG | HEART RATE: 70 BPM | TEMPERATURE: 98 F

## 2024-06-05 LAB
ALBUMIN SERPL-MCNC: 4.5 G/DL (ref 3.5–5.2)
ALBUMIN/GLOB SERPL: 1.8 G/DL
ALP SERPL-CCNC: 59 U/L (ref 39–117)
ALT SERPL W P-5'-P-CCNC: 12 U/L (ref 1–33)
ANION GAP SERPL CALCULATED.3IONS-SCNC: 9 MMOL/L (ref 5–15)
AST SERPL-CCNC: 9 U/L (ref 1–32)
BACTERIA UR QL AUTO: ABNORMAL /HPF
BASOPHILS # BLD AUTO: 0.05 10*3/MM3 (ref 0–0.2)
BASOPHILS NFR BLD AUTO: 0.8 % (ref 0–1.5)
BILIRUB SERPL-MCNC: 0.6 MG/DL (ref 0–1.2)
BILIRUB UR QL STRIP: NEGATIVE
BUN SERPL-MCNC: 11 MG/DL (ref 6–20)
BUN/CREAT SERPL: 13.3 (ref 7–25)
CALCIUM SPEC-SCNC: 8.9 MG/DL (ref 8.6–10.5)
CHLORIDE SERPL-SCNC: 102 MMOL/L (ref 98–107)
CLARITY UR: CLEAR
CO2 SERPL-SCNC: 27 MMOL/L (ref 22–29)
COLOR UR: ABNORMAL
CREAT SERPL-MCNC: 0.83 MG/DL (ref 0.57–1)
DEPRECATED RDW RBC AUTO: 38.1 FL (ref 37–54)
EGFRCR SERPLBLD CKD-EPI 2021: 98.6 ML/MIN/1.73
EOSINOPHIL # BLD AUTO: 0.21 10*3/MM3 (ref 0–0.4)
EOSINOPHIL NFR BLD AUTO: 3.3 % (ref 0.3–6.2)
ERYTHROCYTE [DISTWIDTH] IN BLOOD BY AUTOMATED COUNT: 12.6 % (ref 12.3–15.4)
GLOBULIN UR ELPH-MCNC: 2.5 GM/DL
GLUCOSE SERPL-MCNC: 89 MG/DL (ref 65–99)
GLUCOSE UR STRIP-MCNC: NEGATIVE MG/DL
HCT VFR BLD AUTO: 41.6 % (ref 34–46.6)
HGB BLD-MCNC: 13.9 G/DL (ref 12–15.9)
HGB UR QL STRIP.AUTO: NEGATIVE
HYALINE CASTS UR QL AUTO: ABNORMAL /LPF
IMM GRANULOCYTES # BLD AUTO: 0.02 10*3/MM3 (ref 0–0.05)
IMM GRANULOCYTES NFR BLD AUTO: 0.3 % (ref 0–0.5)
KETONES UR QL STRIP: ABNORMAL
LEUKOCYTE ESTERASE UR QL STRIP.AUTO: ABNORMAL
LYMPHOCYTES # BLD AUTO: 1.38 10*3/MM3 (ref 0.7–3.1)
LYMPHOCYTES NFR BLD AUTO: 21.6 % (ref 19.6–45.3)
MCH RBC QN AUTO: 27.9 PG (ref 26.6–33)
MCHC RBC AUTO-ENTMCNC: 33.4 G/DL (ref 31.5–35.7)
MCV RBC AUTO: 83.5 FL (ref 79–97)
MONOCYTES # BLD AUTO: 0.39 10*3/MM3 (ref 0.1–0.9)
MONOCYTES NFR BLD AUTO: 6.1 % (ref 5–12)
NEUTROPHILS NFR BLD AUTO: 4.35 10*3/MM3 (ref 1.7–7)
NEUTROPHILS NFR BLD AUTO: 67.9 % (ref 42.7–76)
NITRITE UR QL STRIP: NEGATIVE
NRBC BLD AUTO-RTO: 0 /100 WBC (ref 0–0.2)
PH UR STRIP.AUTO: 5.5 [PH] (ref 5–8)
PLATELET # BLD AUTO: 219 10*3/MM3 (ref 140–450)
PMV BLD AUTO: 10 FL (ref 6–12)
POTASSIUM SERPL-SCNC: 3.3 MMOL/L (ref 3.5–5.2)
PROT SERPL-MCNC: 7 G/DL (ref 6–8.5)
PROT UR QL STRIP: NEGATIVE
QT INTERVAL: 375 MS
QTC INTERVAL: 405 MS
RBC # BLD AUTO: 4.98 10*6/MM3 (ref 3.77–5.28)
RBC # UR STRIP: ABNORMAL /HPF
REF LAB TEST METHOD: ABNORMAL
SODIUM SERPL-SCNC: 138 MMOL/L (ref 136–145)
SP GR UR STRIP: 1.02 (ref 1–1.03)
SQUAMOUS #/AREA URNS HPF: ABNORMAL /HPF
UROBILINOGEN UR QL STRIP: ABNORMAL
WBC # UR STRIP: ABNORMAL /HPF
WBC NRBC COR # BLD AUTO: 6.4 10*3/MM3 (ref 3.4–10.8)

## 2024-06-05 PROCEDURE — 85025 COMPLETE CBC W/AUTO DIFF WBC: CPT

## 2024-06-05 PROCEDURE — 80053 COMPREHEN METABOLIC PANEL: CPT

## 2024-06-05 PROCEDURE — 93005 ELECTROCARDIOGRAM TRACING: CPT

## 2024-06-05 PROCEDURE — 36415 COLL VENOUS BLD VENIPUNCTURE: CPT

## 2024-06-05 PROCEDURE — 71046 X-RAY EXAM CHEST 2 VIEWS: CPT

## 2024-06-05 PROCEDURE — 93010 ELECTROCARDIOGRAM REPORT: CPT | Performed by: INTERNAL MEDICINE

## 2024-06-05 PROCEDURE — 81001 URINALYSIS AUTO W/SCOPE: CPT

## 2024-06-05 RX ORDER — GABAPENTIN 100 MG/1
1 CAPSULE ORAL 3 TIMES DAILY
COMMUNITY
Start: 2024-05-17

## 2024-06-05 NOTE — DISCHARGE INSTRUCTIONS
THE HOSPITAL WILL CALL W/ARRIVAL TIME 1-2 DAYS PRIOR TO SURGERY      Take the following medications the morning of surgery:  GABAPENTIN      If you are on prescription narcotic pain medication to control your pain you may also take that medication the morning of surgery.    General Instructions:  Do not eat solid food after midnight the night before surgery.  You may drink clear liquids day of surgery but must stop at least one hour before your hospital arrival time.  It is beneficial for you to have a clear drink that contains carbohydrates the day of surgery.  We suggest a 12 to 20 ounce bottle of Gatorade or Powerade for non-diabetic patients or a 12 to 20 ounce bottle of G2 or Powerade Zero for diabetic patients. (Pediatric patients, are not advised to drink a 12 to 20 ounce carbohydrate drink)    Clear liquids are liquids you can see through.  Nothing red in color.     Plain water                               Sports drinks  Sodas                                   Gelatin (Jell-O)  Fruit juices without pulp such as white grape juice and apple juice  Popsicles that contain no fruit or yogurt  Tea or coffee (no cream or milk added)  Gatorade / Powerade  G2 / Powerade Zero    Infants may have breast milk up to four hours before surgery.  Infants drinking formula may drink formula up to six hours before surgery.   Patients who avoid smoking, chewing tobacco and alcohol for 4 weeks prior to surgery have a reduced risk of post-operative complications.  Quit smoking as many days before surgery as you can.  Do not smoke, use chewing tobacco or drink alcohol the day of surgery.   If applicable bring your C-PAP/ BI-PAP machine in with you to preop day of surgery.  Bring any papers given to you in the doctor’s office.  Wear clean comfortable clothes.  Do not wear contact lenses, false eyelashes or make-up.  Bring a case for your glasses.   Bring crutches or walker if applicable.  Remove all piercings.  Leave jewelry and  any other valuables at home.  Hair extensions with metal clips must be removed prior to surgery.  The Pre-Admission Testing nurse will instruct you to bring medications if unable to obtain an accurate list in Pre-Admission Testing.        If you were given a blood bank ID arm band remember to bring it with you the day of surgery.    Preventing a Surgical Site Infection:  For 2 to 3 days before surgery, avoid shaving with a razor because the razor can irritate skin and make it easier to develop an infection.    Any areas of open skin can increase the risk of a post-operative wound infection by allowing bacteria to enter and travel throughout the body.  Notify your surgeon if you have any skin wounds / rashes even if it is not near the expected surgical site.  The area will need assessed to determine if surgery should be delayed until it is healed.  The night prior to surgery shower using a fresh bar of anti-bacterial soap (such as Dial) and clean washcloth.  Sleep in a clean bed with clean clothing.  Do not allow pets to sleep with you.  Shower on the morning of surgery using a fresh bar of anti-bacterial soap (such as Dial) and clean washcloth.  Dry with a clean towel and dress in clean clothing.  Ask your surgeon if you will be receiving antibiotics prior to surgery.  Make sure you, your family, and all healthcare providers clean their hands with soap and water or an alcohol based hand  before caring for you or your wound.    Day of surgery:  Your arrival time is approximately two hours before your scheduled surgery time.  Upon arrival, a Pre-op nurse and Anesthesiologist will review your health history, obtain vital signs, and answer questions you may have.  The only belongings needed at this time will be a list of your home medications and if applicable your C-PAP/BI-PAP machine.  A Pre-op nurse will start an IV and you may receive medication in preparation for surgery, including something to help you  relax.     Please be aware that surgery does come with discomfort.  We want to make every effort to control your discomfort so please discuss any uncontrolled symptoms with your nurse.   Your doctor will most likely have prescribed pain medications.      If you are going home after surgery you will receive individualized written care instructions before being discharged.  A responsible adult must drive you to and from the hospital on the day of your surgery and ideally stay with you through the night.   .  Discharge prescriptions can be filled by the hospital pharmacy during regular pharmacy hours.  If you are having surgery late in the day/evening your prescription may be e-prescribed to your pharmacy.  Please verify your pharmacy hours or chose a 24 hour pharmacy to avoid not having access to your prescription because your pharmacy has closed for the day.    If you are staying overnight following surgery, you will be transported to your hospital room following the recovery period.  [facility] has all private rooms.    If you have any questions please call Pre-Admission Testing at [phone].  Deductibles and co-payments are collected on the day of service. Please be prepared to pay the required co-pay, deductible or deposit on the day of service as defined by your plan.    Call your surgeon immediately if you experience any of the following symptoms:  Sore Throat  Shortness of Breath or difficulty breathing  Cough  Chills  Body soreness or muscle pain  Headache  Fever  New loss of taste or smell  Do not arrive for your surgery ill.  Your procedure will need to be rescheduled to another time.  You will need to call your physician before the day of surgery to avoid any unnecessary exposure to hospital staff as well as other patients.

## 2024-06-10 PROBLEM — M65.4 DE QUERVAIN'S TENOSYNOVITIS: Status: ACTIVE | Noted: 2024-06-10

## 2024-06-10 NOTE — H&P
Orthopaedic & Hand Surgery  H&P Update  Dr. Damon Blackwood  [phone]    Name: Kristin Jones   : 1995     Date: 24   Time: 07:16 EDT    ------  This document is the preoperative History and Physical and is an extension of the last clinic note that is copied below.  ------    I have reviewed the patient's prior notes, interviewed and examined the patient on the day of surgery in the holding room.  The patient's condition has not changed, there are no new treatments available that obviate the need for surgery. The need for surgery still exists.  I have reviewed the procedure with the patient, answered any last-minute questions and have personally marked the operative site.    Physical exam this morning is unchanged from prior with the addition of:   CV: Regular rate and rhythm.    Respiratory: Non-labored breathing.     Medications:  No current facility-administered medications on file prior to encounter.     Current Outpatient Medications on File Prior to Encounter   Medication Sig Dispense Refill    cholecalciferol (VITAMIN D3) 25 MCG (1000 UT) tablet Take 1 tablet by mouth Daily As Needed.      ibuprofen (ADVIL,MOTRIN) 200 MG tablet Take 1 tablet by mouth Every 6 (Six) Hours As Needed for Mild Pain. HOLDING FOR DOS         Allergies:  Allergies   Allergen Reactions    Doxycycline GI Intolerance    Kiwi Extract Swelling     Oral swelling    Sulfamethoxazole-Trimethoprim GI Intolerance    Nickel Dermatitis, Itching and Rash       Past Medical History:  Patient Active Problem List   Diagnosis    Term pregnancy    Breast lump    Generalized anxiety disorder    Lordosis    Low back pain    Migraine without aura, not refractory    Obesity    Smoker    Dysmenorrhea    Carpal tunnel syndrome of right wrist    Carpal tunnel syndrome of left wrist    Cubital tunnel syndrome on left    Cubital tunnel syndrome on right    De Quervain's tenosynovitis       Family History:  Family History   Problem  Relation Age of Onset    Breast cancer Mother 40        survivor    Hypertension Father     Liver cancer Father     Lung cancer Father     Bone cancer Father     Cervical cancer Sister         40's    Diabetes Maternal Aunt     Melanoma Maternal Uncle     Prostate cancer Paternal Uncle     Cancer Maternal Grandmother         mouth    Lung cancer Maternal Grandmother         double    Colon cancer Paternal Grandmother         80's    Prostate cancer Paternal Uncle     Prostate cancer Paternal Uncle     Ovarian cancer Neg Hx     Uterine cancer Neg Hx     Pulmonary embolism Neg Hx     Deep vein thrombosis Neg Hx     Malig Hyperthermia Neg Hx          Review of Systems - Negative except as stated in the HPI    The most recent clinic note (with her HPI and indications for surgery today) is pasted below:    Name ZURI DENG (29yo, F) ID# [id number] Appt. Date/Time 2024 08:30AM   1995 Service Dept. LOC [facility]  Provider SARA COOL MD  Insurance   Med Primary: PASSPORT BY [employer] (MEDICAID REPLACEMENT - HMO)  Insurance # : [id number]  Policy/Group # : [id number]  Med : *SELF PAY*  Prescription: MEDIMPACT - Member is eligible. details  Chief Complaint  Right follow-up  Patient's Care Team  Primary Care Provider: RANULFO GHOSH APRN: [id number] [place] [address], Ph [phone], Fax [phone] NPI: [id number]  Patient's Pharmacies  [facility] [id number] [facility] [employer] ([facility]): [address] [place] [address], Ph [phone], Fax [phone]  Vitals  Ht: 5 ft 7 in 2024 08:42 am  Wt: 165 lbs 2024 08:43 am  BMI: 25.8 2024 08:43 am  Body Surface Area: 1.88 m² 2024 08:43 am  Allergies  Reviewed Allergies  KIWI: - Critical  Category: Food   SULFA (SULFONAMIDE ANTIBIOTICS): - Critical  Category: Drug   Uncoded Allergies  METAL (Critical) 2022 (Active)  Medications  Reviewed Medications  amantadine  mg  tablet  01/11/24   entered WINSTON ULLOA NP  cholecalciferol (vitamin D3) 25 mcg (1,000 unit) tablet  TAKE 1 TABLET BY MOUTH EVERY DAY  06/26/23   filled surescripts  ibuprofen 600 mg tablet  TAKE 1 TABLET 3 TIMES A DAY BY ORAL ROUTE.  05/30/23   filled surescripts    TOPICAL COMPOUND CREAM PER PAIN MGT  Vaccines  Reviewed Vaccines  no flu 2022  Problems  Reviewed Problems  Postoperative pain - Onset: 06/23/2023  Multiple sclerosis - Onset: 03/11/2024  Carpal tunnel syndrome - Onset: 06/28/2022, Bilateral  Closed injury of sensory branch of radial nerve - Onset: 04/25/2024  Long-term current use of opiate analgesic drug - Onset: 06/23/2023  Postoperative care - Onset: 08/15/2022  Bilateral carpal tunnel syndrome - Onset: 07/14/2022  Carpal tunnel syndrome of right wrist - Onset: 07/14/2022  Pain in right hand - Onset: 08/12/2022  Ulnar nerve entrapment at elbow - Onset: 12/06/2022, Left  Carpal tunnel syndrome of left wrist - Onset: 12/06/2022  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 - Onset: 03/28/2023  Tenosynovitis of right radial styloid - Onset: 03/28/2023  Pain of right wrist - Onset: 05/01/2023  Complex regional pain syndrome type I of right upper limb - Onset: 06/23/2023  Family History  Family History not reviewed (last reviewed 01/11/2024)  Father - Disorder of lung    - Family history of cancer    - Family history of Hypertension  Mother - Family history of cancer    - Family history of stroke  Maternal Grandmother - Rheumatoid arthritis    - Family history of cancer  Sister - Family history of cancer  Paternal Grandmother - Family history of cancer  Social History  Reviewed Social History  Public Health and Travel  Have you recently traveled abroad?: No  Have you been to an area known to be high risk for COVID-19?: No  In the 14 days before symptom onset, have you had close contact with a laboratory-confirmed COVID-19 while that case was ill?: No  In the 14 days before symptom onset, have you had  close contact with a person who is under investigation for COVID-19 while that person was ill?: No  Substance Use  Do you or have you ever smoked tobacco?: Current every day smoker  How many years have you smoked tobacco?: 13  At what age did you start smoking tobacco?: 14  How much tobacco do you smoke?: 1 pack per day  Do you or have you ever used any other forms of tobacco or nicotine?: No  Do you or have you ever used e-cigarettes or vape?: Never used electronic cigarettes  Do you or have you ever used smokeless tobacco?: Never used smokeless tobacco  What was the date of your most recent tobacco screening?: 04/25/2024  Has tobacco cessation counseling been provided?: No  What is your level of alcohol consumption?: Occasional  How many times per week do you consume alcohol?: Less than 1 time per week  Do you use any illicit or recreational drugs?: No  Advance Directive  Do you have an advance directive?: No  Do you have a medical power of ?: No  Surgical History  Surgical History not reviewed (last reviewed 01/11/2024)  Elbow Surgery - 05/26/2023  Extraction of wisdom tooth - 12/01/2015  GYN History  Was the recent bone density a DEXA or DXA?: N.  Past Medical History  Past Medical History not reviewed (last reviewed 01/11/2024)  Anxiety Disorder: Y  Depression: Y  Headaches/Migraines: Y  Neuropathy: Y  Sleep Apnea: Y - MIld obstructive  Notes: hypoglycemia  Screening  Name Score Notes  Steadi Fall Risk - 3 item Not scored   HPI  27y/o right-hand-dominant female with Bilateral wrist carpal tunnel syndrome and left cubital tunnel syndrome status post right carpal tunnel release 7/29/2022 with revision carpal tunnel release on 8/26/2022, left carpal tunnel and cubital tunnel release on 12/30/2022. Now status post right cubital tunnel release on 5/26/2023.    Returns for repeat evaluation. She notes elbow pain is improved with stellate ganglion blocks which helps resolve the stinging pain in the  "elbow.    She has additionally undergone MRI of both the cervical spine as well as the brain to evaluate for multiple sclerosis. The results of these tests were already reviewed by Dr. Hoskins, the neurologist who performed her electrodiagnostic studies. In short, he noted that she had \"one of the healthiest brains he is seen\" and that he does not have concerns for multiple sclerosis based on the imaging.    With regard to her wrist, she notes that the temporary effects of anesthetizing the wrist did cause other areas of her arm to become more noticeably painful. She describes this to no longer having a distracting injury. That being said, she still notes that the pain on the radial aspect of the wrist as well as radiating neuropathic pain is severe enough that she would like to consider proceeding with de Quervain's release in the hopes that it would provide some relief.  ROS  Patient reports arthralgias/joint pain; right hand.  Physical Exam  General  The patient's general appearance was well-nourished, well-hydrated, no acute distress.  Orientation was alert and conversant  Musculoskeletal:    Right hand and wrist:  Inspection: Surgical wound at the elbow is well-healed without erythema, fluctuance or drainage. Depigmentation over the first dorsal compartment has improved. Swelling over the first dorsal compartment of the wrist but no swelling volarly or changes to the incision of the carpal tunnel. Skin is intact, warm and well perfused. No trophic changes noted in the elbow, forearm, wrist or hand at this time.  Palpation: Tender over the first dorsal compartment of the wrist. Tender also with light touch immediately distal to this in the radial sensory nerve distribution. Not tender at the level of the elbow on palpation.  Sensation: Sensation is minimally diminished in the ulnar nerve distribution compared with median. As before, although sensitivity is intact in median nerve distribution, it is reduced in the " radial nerve distribution past the first dorsal compartment. Naylor Igor monofilament testing reveals 0.07 g force sensitivity in all digits, unchanged from prior.  Motor: Able to flex, extend and abduct the fingers. Able to abduct the thumb orthogonal to the plane of the palm.  Vascular: Brisk cap refill to all digits and 2+ radial pulse  Joint stability and range of motion: Able to make a composite fist and fully extend the fingers. Able to oppose the thumb to the distal palmar crease at the base of the small finger. Wrist extension 65°. Wrist flexion 85°. Elbow range of motion remains full.  Other: Finkelstein's test is positive.  Assessment / Plan  29y/o right-hand-dominant female with Bilateral wrist carpal tunnel syndrome and left cubital tunnel syndrome status post right carpal tunnel release 7/29/2022 with revision carpal tunnel release on 8/26/2022, left carpal tunnel and cubital tunnel release on 12/30/2022. Now status post:    1. Right cubital tunnel release on 5/26/2023. Perspective of cubital tunnel syndrome, she has had resolution of symptoms with maintained intrinsic strength and normal sensitivity on exam in the ulnar nerve distribution    Unfortunately, following surgery, she had recurrence of pain in the forearm with paresthetic symptoms in the median nerve distribution. Findings are consistent with CRPS and she has been under the care of occupational therapy and pain management team with a waxing and waning records of recovery. We had previously reviewed that management of CRPS is complex in nature and I think the majority of the symptoms she is experiencing reflect this condition with the exception of de Quervain's tenosynovitis as noted below. Fortunately, it appears that multiple sclerosis is not an etiology for her pain. I will continue with management of CRPS with continued stellate ganglion blocks as needed and therapeutic exercises and desensitization.    2. Right wrist radial  "tenosynovitis, improved following corticosteroid injection on 6/20/2023 and 12/6/2022 but with recurrence. Temporarily improved following anesthetic injection on her last encounter. As we have previously discussed, as she is currently still dealing with pain following her previous surgery, I am concerned that any attempts at surgical intervention could worsen her pain. Currently, she is slowly improving from the worst effects of CRPS and we reviewed that this could again \"flareup\" with further surgery. That being said, she has had several injections already with thinning of the skin and depigmentation after her last injection and I do not think that this is a reasonable course to take at this point. She is also failed extensive therapy and thumb spica bracing. In addition, she appears to have a component of radial sensory neuritis as part of her pain. For this reason, we ultimately agreed that the best course of action would be to proceed with a right wrist first dorsal compartment release with neurolysis of the radial sensory nerve. A thorough discussion of the risks, benefits and alternatives of this procedure as well as the anticipated time course of recovery ensued. Following our discussion, questions were solicited and answered to her satisfaction. She voiced an understanding of the nature of the condition, the indicated procedure, the risks and the alternatives. She requested surgery and verbal consent was obtained. In keeping with facility practice, written consent will be obtained on the day of procedure.    1. 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  G56.21: Lesion of ulnar nerve, right upper limb    2. Postoperative care  Z48.89: Encounter for other specified surgical aftercare    3. Carpal tunnel syndrome of right wrist  G56.01: Carpal tunnel syndrome, right upper limb    4. Tenosynovitis of right radial styloid  M65.4: Radial styloid tenosynovitis [de Quervain]    5. Closed injury of sensory branch of " radial nerve  S54.21XD: Injury of radial nerve at forearm level, right arm, subsequent encounter    MRI, BRAIN, W/WO CONTRAST    MRI of the brain and cervical spine was obtained and interpreted on 3/27/2024. Imaging was not available for my review. Below is the radiologist impression.  Impression:  Brain:  1. Possible very early findings of multiple sclerosis. Please note that these findings are nonspecific. No restricted diffusion or enhancement to suggest active demyelination.    Cervical spine:  1. No abnormal cord signal to suggest multiple sclerosis affecting the cervical spine.  2. Mild degenerative changes.  3. Decreased signal in the dens possibly representative of degenerative disease. Correlate clinically.    Patient Instructions  1. Plan for surgery as noted below:  - Diagnosis: Right wrist De Quervain's tenosynovitis with right wrist radial sensory nerve neuritis  - Proposed surgery: Right wrist first dorsal compartment release (CPT 35854) with neurolysis of the radial sensory nerve (CPT 85585)  - Special considerations/equipment: To be performed under MAC regional anesthetic. Patient supine with nonsterile tourniquet control.  - Risks of surgery: Pain, bleeding, infection, damage to nerves, blood vessels or other surrounding structures including traction neuritis of the radial sensory nerve, incomplete release and/or recurrence of the condition, worsening of CRPS, tendon subluxation, stiffness, scar, further procedures, unforeseen risks of surgery including stroke and death.  -Alternatives to surgery: No surgery, bracing, injections    2. She will need postoperative evaluation at 10 to 14 days for wound inspection and suture removal.    Return to Office  Patient will return to the office as needed.  Encounter Sign-Off  Encounter signed-off by Damon Blackwood MD, 04/25/2024.  Encounter performed and documented by Damon Blackwood MD  Encounter reviewed & signed by Damon Blackwood MD on 04/25/2024 at  9:45pm    -----    I have evaluated the patient and determined that she is stable and cleared for surgery in the ambulatory setting.      Damon Blackwood MD, PhD  Orthopaedic & Hand Surgery  [facility]  [phone] - [place] [facility]  [phone] - [facility]

## 2024-06-10 NOTE — DISCHARGE INSTRUCTIONS
Orthopaedic & Hand Surgery  Discharge Instructions  Dr. Damon Blackwood  [phone]    INCISION CARE  Wash your hands prior to dressing changes  The postoperative dressings should be taken off starting on postoperative day #4. New dry dressings can then be placed around the wound and held in place with an Ace wrap. Dressings should be changed daily.  No creams or ointments to the incision until 3 weeks post op.   It is okay to wash the incision with clean water (water you would drink) and soap but do not scrub. Do not soak your incision. After after showering or washing her hands, pat the wound dry gently and cover with new dry dressings.  Do not touch or pick at the incision  Check incision every day and notify surgeon immediately if any of the following signs or symptoms are seen:  Increase in redness  Increase in swelling around the incision and of the entire extremity  Increase in pain  NEW drainage or oozing from the incision  Pulling apart of the edges of the incision  Increase in overall body temperature (greater than 100.4°F)  Your surgeon will instruct you regarding suture or staple removal. In general, this happens at the 1-2-week post op appointment.    ACTIVITIES  Exercises:  Physical therapy may or may not be needed after surgery. Once some healing has occurred, it will be possible to start therapy if you wish. However, most patients get their function back fairly well after surgery without formal therapy.  Activities of Daily Living:   Showering may begin immediately if the wrist can be protected. The splint and incision cannot get wet after surgery.   No tub baths, hot tubs, or swimming pools for 4 weeks.  May shower and let water run over the incision once the sutures are removed if there is no drainage and the wound is well healing. A new dry dressing can be applied after showering.     Restrictions  Weight: Do not put weight on the wrist until instructed to do so. Your surgeon will discuss  with restrictions in terms of activities allowed. In general, anything more strenuous than holding a pen or piece of paper should be avoided, the other wrist should do the vast majority of the work until the soft tissues have healed enough that it is not painful, then it is ok to use the wrist more strenuously.   Driving: Many patients have questions about when it is safe to return to driving. The answer is that this is extremely variable. It depends on how quickly you heal. Until you can safely navigate the steering wheel, and are off of all narcotics, driving is not permitted. Your surgeon cannot “clear” you to return to driving, only you can make the decision when you feel it is safe.     Pain Control  Ice:  Ice is an excellent pain reliever. This can be used regardless of whether or not you are taking pain medication.  Apply an ice pack to the surgical area for 20 minutes at a time, removing it for at least an hour to prevent frostbite.  You should keep a towel between any dressings on the ice pack to prevent them from getting damp and from developing frostbite on the operative site.  Elevation:  Elevation is another easy way to control pain after surgery. Whenever possible, keep the operative limb elevated above the level of your heart to reduce swelling.  Acetaminophen (Tylenol):  CLASSIFICATION: A non-narcotic medication that is available without a prescription.  Acetaminophen controls pain but does not affect swelling or inflammation.  DRIVING: Acetaminophen will not impair your ability to drive. It is safe to drive while taking if your physical condition does not limit you.  POTENTIAL SIDE EFFECTS: nausea, stomach upset, liver failure if taken in large doses.  Interactions: Some narcotic medications contain acetaminophen. If you have a narcotic prescription, make sure to cut back on the acetaminophen if you are taking the narcotic. You should never take more 3000 mg of acetaminophen in one 24-hour  period.  DOSAGE:  Following surgery, you may take two regular strength (325 mg) tabs to control pain every 6 hours. This can be taken with NSAIDs (see below) or alternating the two.  After the initial surgical pain begins to resolve, you may begin to decrease the pain medication. By the end of a few weeks, you should be off of pain medications.  NSAIDS: This includes aspirin, ibuprofen, naproxen, Motrin, Aleve, Mobic, Celebrex  CLASSIFICATION: These are non-narcotic medications that are available without a prescription.  They are particularly effective at reducing swelling and inflammation  DRIVING: These medications will not impair your ability to drive. It is safe to drive while taking these medications if your physical condition does not limit you.  POTENTIAL SIDE EFFECTS: nausea, stomach upset, ulcer, gastric bleeding, kidney failure.  DOSAGE:  Following surgery, you may take ibuprofen (Motrin) 600 mg to control pain every 6 hours with food. It helps to take it scheduled (around the clock) to allow it to help reduce swelling.  After the initial surgical pain begins to resolve, you may begin to decrease the pain medication. By the end of a few weeks, you should be off of pain medications.    Medications  Anticoagulants: After upper extremity surgery most patients do not require an anticoagulant unless you have another injury that will be keeping you from mobilizing.  If you were already taking an anticoagulant (commonly Aspirin, Coumadin, or Plavix) you will likely be resuming your normal dose postoperatively and will be continuing that medication at the discretion of the prescribing physician.    FOLLOW-UP VISITS  You will need to follow up in the office with your surgeon in 1-2 weeks, or as instructed elsewhere in your discharge paperwork. Please call this number [phone] to schedule this appointment if one has not already been made.  If you have any concerns or suspected complications prior to your follow  up visit, please call the office. Do not wait until your appointment time if you suspect complications. These will need to be addressed in the office promptly.        What to expect after a Nerve Block    Nerve blocks administered to block pain affect many types of nerves, including those nerves that control movement, pain, and normal sensation. Following a nerve block, you may notice some bruising at the site where the block was given. You may experience sensations such as: numbness of the affected area or limb, tingling, heaviness (that is the limb feels heavy to you), weakness or inability to move the affected arm or leg, or a feeling as if your arm or leg has “fallen asleep.”     A nerve block can last from 2 to 36 hours depending on the medications used.  Usually the weakness wears off first followed by the tingling and heaviness. As the block wears off, you may begin to notice pain; however, this sequence of events may occur in any order. Typically, you will be able to move your limb before you will feel it. Once a nerve block begins to wear off, the effects are usually completely gone within 60 minutes.  If you experience continued side effects that you believe are block related for longer than 48 hours, please call your healthcare provider. Please see block-specific instructions below.    Instructions for any block involving the shoulder or arm  If you have had any kind of shoulder/arm block, you will go home with your arm in a sling. Wear the sling until the block has completely worn off. You may be required to wear it for a longer period of time per your surgeon’s recommendations.  If you have had a shoulder/arm block, it is a good idea to sleep on a recliner with pillows under your arm.    You may experience symptoms such as:  Shortness of breath  Hoarseness   Blurry vision  Unequal pupils  Drooping of your face on the same side as the block was performed    These are side effects associated with this  kind of block and should go away within 12 hours.    Note: If you have severe or prolonged shortness of breath, please seek medical assistance as soon as possible.     Protection of a “blocked” arm or leg (limb)  After a nerve block, you cannot feel pain, pressure, or extremes of temperature in the affected limb. And because of this, your blocked limb is at more risk for injury. For example, it is possible to burn your limb on an extremely hot surface without feeling it.     When resting, it is important to reposition your limb periodically to avoid prolonged pressure on it. This may require the use of pillows and padding.    While sleeping, you should avoid rolling onto the affected limb or putting too much pressure on it.     If you have a cast or tight dressing, check the color of your fingers or toes of the affected limb. Call your surgeon if they look discolored (that is, dusky, dark colored).    Use caution in cold weather. Cover your limb appropriately to protect it from the cold.      Pain Management:    Your surgeon will give you a prescription for pain medication. Begin taking this before the nerve block wears off. Bear in mind that sometimes the block can wear off in the middle of the night.      Damon Blackwood MD, PhD  Orthopaedic Surgery  [facility]

## 2024-06-14 ENCOUNTER — ANESTHESIA (OUTPATIENT)
Dept: PERIOP | Facility: HOSPITAL | Age: 29
End: 2024-06-14
Payer: COMMERCIAL

## 2024-06-14 ENCOUNTER — HOSPITAL ENCOUNTER (OUTPATIENT)
Facility: HOSPITAL | Age: 29
Setting detail: HOSPITAL OUTPATIENT SURGERY
Discharge: HOME | End: 2024-06-14
Attending: STUDENT IN AN ORGANIZED HEALTH CARE EDUCATION/TRAINING PROGRAM | Admitting: STUDENT IN AN ORGANIZED HEALTH CARE EDUCATION/TRAINING PROGRAM
Payer: COMMERCIAL

## 2024-06-14 ENCOUNTER — ANESTHESIA EVENT (OUTPATIENT)
Dept: PERIOP | Facility: HOSPITAL | Age: 29
End: 2024-06-14
Payer: COMMERCIAL

## 2024-06-14 VITALS
HEART RATE: 55 BPM | TEMPERATURE: 98 F | OXYGEN SATURATION: 100 % | RESPIRATION RATE: 16 BRPM | SYSTOLIC BLOOD PRESSURE: 124 MMHG | DIASTOLIC BLOOD PRESSURE: 95 MMHG

## 2024-06-14 DIAGNOSIS — M65.4 DE QUERVAIN'S TENOSYNOVITIS: Primary | ICD-10-CM

## 2024-06-14 PROCEDURE — 25010000002 FENTANYL CITRATE (PF) 50 MCG/ML SOLUTION: Performed by: ANESTHESIOLOGY

## 2024-06-14 PROCEDURE — 25010000002 PROPOFOL 200 MG/20ML EMULSION: Performed by: NURSE ANESTHETIST, CERTIFIED REGISTERED

## 2024-06-14 PROCEDURE — 25810000003 LACTATED RINGERS PER 1000 ML: Performed by: ANESTHESIOLOGY

## 2024-06-14 PROCEDURE — 25010000002 PROPOFOL 10 MG/ML EMULSION: Performed by: NURSE ANESTHETIST, CERTIFIED REGISTERED

## 2024-06-14 PROCEDURE — 25010000002 DEXAMETHASONE PER 1 MG: Performed by: ANESTHESIOLOGY

## 2024-06-14 PROCEDURE — 25010000002 MIDAZOLAM PER 1 MG: Performed by: ANESTHESIOLOGY

## 2024-06-14 PROCEDURE — 81025 URINE PREGNANCY TEST: CPT | Performed by: STUDENT IN AN ORGANIZED HEALTH CARE EDUCATION/TRAINING PROGRAM

## 2024-06-14 PROCEDURE — 25010000002 ROPIVACAINE PER 1 MG: Performed by: ANESTHESIOLOGY

## 2024-06-14 PROCEDURE — 25010000002 CEFAZOLIN PER 500 MG: Performed by: STUDENT IN AN ORGANIZED HEALTH CARE EDUCATION/TRAINING PROGRAM

## 2024-06-14 RX ORDER — FAMOTIDINE 10 MG/ML
20 INJECTION, SOLUTION INTRAVENOUS ONCE
Status: COMPLETED | OUTPATIENT
Start: 2024-06-14 | End: 2024-06-14

## 2024-06-14 RX ORDER — SODIUM CHLORIDE, SODIUM LACTATE, POTASSIUM CHLORIDE, CALCIUM CHLORIDE 600; 310; 30; 20 MG/100ML; MG/100ML; MG/100ML; MG/100ML
9 INJECTION, SOLUTION INTRAVENOUS CONTINUOUS
Status: DISCONTINUED | OUTPATIENT
Start: 2024-06-14 | End: 2024-06-14 | Stop reason: HOSPADM

## 2024-06-14 RX ORDER — FENTANYL CITRATE 50 UG/ML
50 INJECTION, SOLUTION INTRAMUSCULAR; INTRAVENOUS
Status: DISCONTINUED | OUTPATIENT
Start: 2024-06-14 | End: 2024-06-14 | Stop reason: HOSPADM

## 2024-06-14 RX ORDER — SODIUM CHLORIDE 0.9 % (FLUSH) 0.9 %
3 SYRINGE (ML) INJECTION EVERY 12 HOURS SCHEDULED
Status: DISCONTINUED | OUTPATIENT
Start: 2024-06-14 | End: 2024-06-14

## 2024-06-14 RX ORDER — MIDAZOLAM HYDROCHLORIDE 1 MG/ML
2 INJECTION INTRAMUSCULAR; INTRAVENOUS
Status: COMPLETED | OUTPATIENT
Start: 2024-06-14 | End: 2024-06-14

## 2024-06-14 RX ORDER — IPRATROPIUM BROMIDE AND ALBUTEROL SULFATE 2.5; .5 MG/3ML; MG/3ML
3 SOLUTION RESPIRATORY (INHALATION) ONCE AS NEEDED
Status: DISCONTINUED | OUTPATIENT
Start: 2024-06-14 | End: 2024-06-14 | Stop reason: HOSPADM

## 2024-06-14 RX ORDER — DROPERIDOL 2.5 MG/ML
0.62 INJECTION, SOLUTION INTRAMUSCULAR; INTRAVENOUS
Status: DISCONTINUED | OUTPATIENT
Start: 2024-06-14 | End: 2024-06-14 | Stop reason: HOSPADM

## 2024-06-14 RX ORDER — PROMETHAZINE HYDROCHLORIDE 25 MG/1
25 TABLET ORAL ONCE AS NEEDED
Status: DISCONTINUED | OUTPATIENT
Start: 2024-06-14 | End: 2024-06-14 | Stop reason: HOSPADM

## 2024-06-14 RX ORDER — MIDAZOLAM HYDROCHLORIDE 1 MG/ML
1 INJECTION INTRAMUSCULAR; INTRAVENOUS
Status: DISCONTINUED | OUTPATIENT
Start: 2024-06-14 | End: 2024-06-14 | Stop reason: HOSPADM

## 2024-06-14 RX ORDER — ACETAMINOPHEN 500 MG
1000 TABLET ORAL ONCE
Status: COMPLETED | OUTPATIENT
Start: 2024-06-14 | End: 2024-06-14

## 2024-06-14 RX ORDER — DEXAMETHASONE SODIUM PHOSPHATE 4 MG/ML
INJECTION, SOLUTION INTRA-ARTICULAR; INTRALESIONAL; INTRAMUSCULAR; INTRAVENOUS; SOFT TISSUE
Status: COMPLETED | OUTPATIENT
Start: 2024-06-14 | End: 2024-06-14

## 2024-06-14 RX ORDER — LABETALOL HYDROCHLORIDE 5 MG/ML
5 INJECTION, SOLUTION INTRAVENOUS
Status: DISCONTINUED | OUTPATIENT
Start: 2024-06-14 | End: 2024-06-14 | Stop reason: HOSPADM

## 2024-06-14 RX ORDER — ROPIVACAINE HYDROCHLORIDE 5 MG/ML
INJECTION, SOLUTION EPIDURAL; INFILTRATION; PERINEURAL
Status: COMPLETED | OUTPATIENT
Start: 2024-06-14 | End: 2024-06-14

## 2024-06-14 RX ORDER — NALOXONE HCL 0.4 MG/ML
0.2 VIAL (ML) INJECTION AS NEEDED
Status: DISCONTINUED | OUTPATIENT
Start: 2024-06-14 | End: 2024-06-14 | Stop reason: HOSPADM

## 2024-06-14 RX ORDER — HYDROCODONE BITARTRATE AND ACETAMINOPHEN 5; 325 MG/1; MG/1
1 TABLET ORAL ONCE AS NEEDED
Status: DISCONTINUED | OUTPATIENT
Start: 2024-06-14 | End: 2024-06-14 | Stop reason: HOSPADM

## 2024-06-14 RX ORDER — DIPHENHYDRAMINE HYDROCHLORIDE 50 MG/ML
12.5 INJECTION INTRAMUSCULAR; INTRAVENOUS
Status: DISCONTINUED | OUTPATIENT
Start: 2024-06-14 | End: 2024-06-14 | Stop reason: HOSPADM

## 2024-06-14 RX ORDER — FLUMAZENIL 0.1 MG/ML
0.2 INJECTION INTRAVENOUS AS NEEDED
Status: DISCONTINUED | OUTPATIENT
Start: 2024-06-14 | End: 2024-06-14 | Stop reason: HOSPADM

## 2024-06-14 RX ORDER — HYDRALAZINE HYDROCHLORIDE 20 MG/ML
5 INJECTION INTRAMUSCULAR; INTRAVENOUS
Status: DISCONTINUED | OUTPATIENT
Start: 2024-06-14 | End: 2024-06-14 | Stop reason: HOSPADM

## 2024-06-14 RX ORDER — ONDANSETRON 2 MG/ML
4 INJECTION INTRAMUSCULAR; INTRAVENOUS ONCE AS NEEDED
Status: DISCONTINUED | OUTPATIENT
Start: 2024-06-14 | End: 2024-06-14 | Stop reason: HOSPADM

## 2024-06-14 RX ORDER — EPHEDRINE SULFATE 50 MG/ML
5 INJECTION, SOLUTION INTRAVENOUS ONCE AS NEEDED
Status: DISCONTINUED | OUTPATIENT
Start: 2024-06-14 | End: 2024-06-14 | Stop reason: HOSPADM

## 2024-06-14 RX ORDER — LIDOCAINE HYDROCHLORIDE AND EPINEPHRINE 10; 10 MG/ML; UG/ML
INJECTION, SOLUTION INFILTRATION; PERINEURAL AS NEEDED
Status: DISCONTINUED | OUTPATIENT
Start: 2024-06-14 | End: 2024-06-14 | Stop reason: HOSPADM

## 2024-06-14 RX ORDER — LIDOCAINE HYDROCHLORIDE 20 MG/ML
INJECTION, SOLUTION EPIDURAL; INFILTRATION; INTRACAUDAL; PERINEURAL AS NEEDED
Status: DISCONTINUED | OUTPATIENT
Start: 2024-06-14 | End: 2024-06-14 | Stop reason: SURG

## 2024-06-14 RX ORDER — OXYCODONE AND ACETAMINOPHEN 7.5; 325 MG/1; MG/1
1 TABLET ORAL EVERY 4 HOURS PRN
Status: DISCONTINUED | OUTPATIENT
Start: 2024-06-14 | End: 2024-06-14 | Stop reason: HOSPADM

## 2024-06-14 RX ORDER — SODIUM CHLORIDE 0.9 % (FLUSH) 0.9 %
3-10 SYRINGE (ML) INJECTION AS NEEDED
Status: DISCONTINUED | OUTPATIENT
Start: 2024-06-14 | End: 2024-06-14

## 2024-06-14 RX ORDER — PROPOFOL 10 MG/ML
INJECTION, EMULSION INTRAVENOUS AS NEEDED
Status: DISCONTINUED | OUTPATIENT
Start: 2024-06-14 | End: 2024-06-14 | Stop reason: SURG

## 2024-06-14 RX ORDER — MAGNESIUM HYDROXIDE 1200 MG/15ML
LIQUID ORAL AS NEEDED
Status: DISCONTINUED | OUTPATIENT
Start: 2024-06-14 | End: 2024-06-14 | Stop reason: HOSPADM

## 2024-06-14 RX ORDER — HYDROCODONE BITARTRATE AND ACETAMINOPHEN 5; 325 MG/1; MG/1
1 TABLET ORAL EVERY 6 HOURS PRN
Qty: 15 TABLET | Refills: 0 | Status: SHIPPED | OUTPATIENT
Start: 2024-06-14

## 2024-06-14 RX ORDER — PROMETHAZINE HYDROCHLORIDE 25 MG/1
25 SUPPOSITORY RECTAL ONCE AS NEEDED
Status: DISCONTINUED | OUTPATIENT
Start: 2024-06-14 | End: 2024-06-14 | Stop reason: HOSPADM

## 2024-06-14 RX ORDER — HYDROMORPHONE HYDROCHLORIDE 1 MG/ML
0.5 INJECTION, SOLUTION INTRAMUSCULAR; INTRAVENOUS; SUBCUTANEOUS
Status: DISCONTINUED | OUTPATIENT
Start: 2024-06-14 | End: 2024-06-14 | Stop reason: HOSPADM

## 2024-06-14 RX ADMIN — MIDAZOLAM 2 MG: 1 INJECTION INTRAMUSCULAR; INTRAVENOUS at 08:01

## 2024-06-14 RX ADMIN — CEFAZOLIN 2000 MG: 2 INJECTION, POWDER, FOR SOLUTION INTRAVENOUS at 09:14

## 2024-06-14 RX ADMIN — FENTANYL CITRATE 50 MCG: 50 INJECTION, SOLUTION INTRAMUSCULAR; INTRAVENOUS at 08:01

## 2024-06-14 RX ADMIN — DEXAMETHASONE SODIUM PHOSPHATE 4 MG: 4 INJECTION, SOLUTION INTRA-ARTICULAR; INTRALESIONAL; INTRAMUSCULAR; INTRAVENOUS; SOFT TISSUE at 08:07

## 2024-06-14 RX ADMIN — PROPOFOL 130 MCG/KG/MIN: 10 INJECTION, EMULSION INTRAVENOUS at 09:23

## 2024-06-14 RX ADMIN — PROPOFOL 80 MG: 10 INJECTION, EMULSION INTRAVENOUS at 09:23

## 2024-06-14 RX ADMIN — SODIUM CHLORIDE, POTASSIUM CHLORIDE, SODIUM LACTATE AND CALCIUM CHLORIDE: 600; 310; 30; 20 INJECTION, SOLUTION INTRAVENOUS at 10:04

## 2024-06-14 RX ADMIN — LIDOCAINE HYDROCHLORIDE 50 MG: 20 INJECTION, SOLUTION EPIDURAL; INFILTRATION; INTRACAUDAL; PERINEURAL at 09:23

## 2024-06-14 RX ADMIN — FAMOTIDINE 20 MG: 10 INJECTION INTRAVENOUS at 07:51

## 2024-06-14 RX ADMIN — ACETAMINOPHEN 1000 MG: 500 TABLET ORAL at 07:44

## 2024-06-14 RX ADMIN — MIDAZOLAM 2 MG: 1 INJECTION INTRAMUSCULAR; INTRAVENOUS at 08:10

## 2024-06-14 RX ADMIN — ROPIVACAINE HYDROCHLORIDE 30 ML: 5 INJECTION EPIDURAL; INFILTRATION; PERINEURAL at 08:07

## 2024-06-14 NOTE — ANESTHESIA PROCEDURE NOTES
Peripheral Block      Patient reassessed immediately prior to procedure    Patient location during procedure: pre-op  Start time: 6/14/2024 8:02 AM  Stop time: 6/14/2024 8:07 AM  Reason for block: at surgeon's request and primary anesthetic  Performed by  Anesthesiologist: Andry Dumont MD  Preanesthetic Checklist  Completed: patient identified, IV checked, site marked, risks and benefits discussed, surgical consent, monitors and equipment checked, pre-op evaluation and timeout performed  Prep:  Sterile barriers:cap, gloves, mask, alcohol skin prep and washed/disinfected hands  Prep: ChloraPrep  Patient monitoring: blood pressure monitoring, EKG and continuous pulse oximetry  Procedure    Sedation: yes    Guidance:ultrasound guided    ULTRASOUND INTERPRETATION.  Using ultrasound guidance a 21 G gauge needle was placed in close proximity to the brachial plexus nerve, at which point, under ultrasound guidance anesthetic was injected in the area of the nerve and spread of the anesthesia was seen on ultrasound in close proximity thereto.  There were no abnormalities seen on ultrasound; a digital image was taken; and the patient tolerated the procedure with no complications. Images:still images obtained, printed/placed on chart    Laterality:right  Block Type:supraclavicular  Injection Technique:single-shot  Needle Type:short-bevel  Needle Gauge:21 G  Loss of resistance: normal.    Medications Used: ropivacaine (NAROPIN) 0.5 % injection - Injection   30 mL - 6/14/2024 8:07:00 AM  dexamethasone (DECADRON) injection - Injection   4 mg - 6/14/2024 8:07:00 AM      Medications  Comment:Ultrasound Interpretation:  Ultrasound guidance utilized for visualization of needle approach to brachial plexus at supraclavicular level and verification of local anesthetic disbursement to surrounding tissues. Photo printed and placed on chart for reference.    Post Assessment  Injection Assessment: negative aspiration for heme, no  paresthesia on injection and incremental injection  Patient Tolerance:comfortable throughout block  Complications:no  Performed by: Andry Dumont MD

## 2024-06-14 NOTE — OP NOTE
Orthopaedic & Hand Surgery  DeQuervain's Release Operative Report  Dr. Damon Blackwood  [phone]      PATIENT NAME: Kristin Jones  MRN: [id number]  : 1995 AGE: 28 y.o. GENDER: female  DATE OF OPERATION: 2024  PREOPERATIVE DIAGNOSIS:   Right deQuervain's Tenosynovitis with radial nerve neuritis  POSTOPERATIVE DIAGNOSIS:   Right deQuervain's Tenosynovitis  OPERATION PERFORMED:   Right first dorsal compartment release (CPT 88111)  SURGEON: Damon Blackwood MD, PhD  ANESTHESIA: MAC regional anesthetic with local anesthetic  ASSISTANT: none  ESTIMATED BLOOD LOSS: Less than 5 ml  SPECIMENS: None  SPONGE AND NEEDLE COUNT: Correct  INDICATIONS: This patient is noted to have pain in the first dorsal compartment that was not amenable to conservative treatment. The risks of surgery were discussed and included Pain, Bleeding, Infection, Complications of anesthesia, Damage to blood vessels, nerves and other surrounding structures, Stiffness, Scar, Incomplete resolution or recurrence of the condition, Further procedures, and Unforeseen risks of surgery including stroke, heart stoppage or death. No guarantees were made. Following a thorough discussion, questions were solicited and answered to their satisfaction. Verbal and written consent were obtained prior to proceeding with surgery.    PERTINENT FINDINGS:    Significant scarring surrounding the radial sensory nerves  Thickening of the first dorsal compartment retinaculum with minimal synovitis of the APL and EPB tendons    DETAILS OF PROCEDURE:  The patient was met in the preoperative area. The site was marked. The consent and H&P were reviewed. The patient was then transferred to the operative suite.    After induction of a satisfactory anesthetic, the patient was positioned supine with the right arm extended on an arm board. The hand and arm was prepped and draped in normal sterile fashion which included alcohol, chlorhexidine and multiple layers  of sterile draping.     A surgical timeout was taken to verify the patient's identity, the surgical side/site, planned procedure and the administration of preoperative antibiotics. Bipolar cautery and 3.5-power loupe magnification were used.     A 3 cm, longitudinal incision was planned over the first dorsal compartment. The skin was incised and hemostasis achieved. The branches of the radial sensory nerve were identified and protected. Of note, there was no significant scarring surrounding radial sensory nerve branches. For this reason, an neurolysis was not performed. The first compartment was opened along its dorsal margin making sure to release all septae within the compartment and free up the APL and EPB tendons.     The wound was then thoroughly irrigated and closed with 4-0 Prolene and a vessel loop pledget. A sterile dressing and splint were applied with the wrist in neutral position and the fingers free.     The patient was transferred to the [facility] and taken to the recovery room in stable condition. Sponge and needle count were reported to me as correct. There were no complications. The patient tolerated the procedure well.    Post Operative Plan:   The patient is to keep the hand elevated, and to use the hand lightly  The dressings are to be left undisturbed  Keep hand dry until sutures removed on the first post op visit  Shower OK one day after suture removal  Hand may be immersed and scar massage started three days after suture removal  Hand can be used gently but progressively until four weeks post op, then normal use and return to regular duty is allowed  Anticipate soreness with , pinch and wrist pressure for up to six months    Damon Blackwood MD, PhD  Orthopaedic & Hand Surgery  [facility]  [phone] - [place] Office  [phone] - [facility]

## 2024-06-14 NOTE — ANESTHESIA POSTPROCEDURE EVALUATION
Patient: Kristin Jones    Procedure Summary       Date: 06/14/24 Room / Location:  [facility] [address] [place] [address] [facility]    Anesthesia Start: 0917 Anesthesia Stop: 1024    Procedure: RIGHT WRIST FIRST DORSAL COMPARTMENT RELEASE (Right: Hand) Diagnosis:     Surgeons: Damon Blackwood MD Provider: Andry Dumont MD    Anesthesia Type: regional ASA Status: 2            Anesthesia Type: regional    Vitals  Vitals Value Taken Time   /54 06/14/24 1035   Temp 36.7 °C (98 °F) 06/14/24 1023   Pulse 45 06/14/24 1043   Resp 17 06/14/24 1035   SpO2 100 % 06/14/24 1043   Vitals shown include unfiled device data.        Post Anesthesia Care and Evaluation    Patient location during evaluation: bedside  Patient participation: complete - patient participated  Level of consciousness: sleepy but conscious  Pain management: adequate    Airway patency: patent  Anesthetic complications: No anesthetic complications    Cardiovascular status: acceptable  Respiratory status: acceptable  Hydration status: acceptable    Comments: */54 (BP Location: Left arm, Patient Position: Lying)   Pulse (!) 45   Temp 36.7 °C (98 °F) (Oral)   Resp 17   SpO2 100%

## 2024-06-14 NOTE — ANESTHESIA PREPROCEDURE EVALUATION
Anesthesia Evaluation     history of anesthetic complications (PDPH):   NPO Solid Status: > 8 hours  NPO Liquid Status: > 2 hours           Airway   Mallampati: I  Neck ROM: full  no difficulty expected  Dental - normal exam     Pulmonary - normal exam   (+) a smoker Current,sleep apnea  (-) COPD, asthma    PE comment: nonlabored  Cardiovascular - normal exam  Exercise tolerance: good (4-7 METS)    Rhythm: regular  Rate: normal    (+) valvular problems/murmurs (asymptomatic valvular regurgitation)  (-) hypertension, past MI, CAD, dysrhythmias, angina      Neuro/Psych  (+) headaches, tremors, numbness (carpal tunnel syndrome), psychiatric history Anxiety and Depression  (-) seizures, TIA, CVA  GI/Hepatic/Renal/Endo - negative ROS   (-) GERD, liver disease, no renal disease, diabetes, no thyroid disorder    Musculoskeletal     (+) arthralgias, back pain, chronic pain (complex regional pain syndrome) type I--R UE)  Abdominal    Substance History      OB/GYN          Other   arthritis,                 Anesthesia Plan    ASA 2     regional     (Discussed MAC as secondary anesthetic and GA as backup. Pt agreeable to proceed as needed.)    Anesthetic plan, risks, benefits, and alternatives have been provided, discussed and informed consent has been obtained with: patient.    CODE STATUS:

## 2025-01-29 ENCOUNTER — OFFICE VISIT (OUTPATIENT)
Dept: OBSTETRICS AND GYNECOLOGY | Facility: CLINIC | Age: 30
End: 2025-01-29
Payer: COMMERCIAL

## 2025-01-29 VITALS
HEIGHT: 68 IN | HEART RATE: 90 BPM | BODY MASS INDEX: 25.16 KG/M2 | WEIGHT: 166 LBS | DIASTOLIC BLOOD PRESSURE: 75 MMHG | SYSTOLIC BLOOD PRESSURE: 113 MMHG

## 2025-01-29 DIAGNOSIS — Z91.89 AT HIGH RISK FOR BREAST CANCER: ICD-10-CM

## 2025-01-29 DIAGNOSIS — Z01.419 WELL WOMAN EXAM WITH ROUTINE GYNECOLOGICAL EXAM: Primary | ICD-10-CM

## 2025-01-29 PROBLEM — M79.641 PAIN IN RIGHT HAND: Status: ACTIVE | Noted: 2022-08-12

## 2025-01-29 PROBLEM — G90.511: Status: ACTIVE | Noted: 2023-06-23

## 2025-01-29 PROBLEM — M65.4 RADIAL STYLOID TENOSYNOVITIS OF RIGHT HAND: Status: ACTIVE | Noted: 2023-03-28

## 2025-01-29 PROBLEM — F43.12 POST-TRAUMATIC STRESS DISORDER, CHRONIC: Status: ACTIVE | Noted: 2022-09-26

## 2025-01-29 PROBLEM — G56.23 CUBITAL TUNNEL SYNDROME, BILATERAL: Status: ACTIVE | Noted: 2022-07-14

## 2025-01-29 NOTE — PROGRESS NOTES
Patient or patient representative verbalized consent for the use of Ambient Listening during the visit with  Jessica Jacome MD for chart documentation. 2025  14:25 EST    History of Present Illness  The patient is a 29-year-old K3P6-0-8-6, here for an annual exam.    She has no history of abnormal Pap smears, and her last Pap smear was in 2023 and was normal.     She had BRCA testing through her primary care and reports that it was negative.     She reports no changes in her gynecologic health, with regular menstrual cycles.   She has had stellate ganglion blocks for CRPS and read that they could affect the menstrual cycle. She had a stellate ganglion block approximately 1 to 2 months ago, which she believes may have caused a slight delay in her period. Her menstrual flow is typically heavy initially, requiring 6 to 7 tampon or pad changes on the heaviest day, but it tapers off to a normal level. She is not currently using any form of hormonal birth control and is sexually active, uses condoms.     She declines STD testing at this time. Has remote h/o chlamydia. She reports no vaginal discharge or other symptoms.     She has not consulted a high-risk breast clinic but has undergone genetic testing through her primary care physician, which returned negative results for all forms of cancer in her family. She has no known Ashkenazi Confucianist heritage. She has never had a breast biopsy but discovered cysts in her breast at age 10. She has had breast ultrasounds, the most recent of which was during her pregnancy in . She consulted a breast surgeon about reduction surgery during her pregnancy due to dimpling on her right breast, which resolved.     She has been evaluated by a neurologist for multiple sclerosis, who ruled it out despite the presence of a few brain lesions.    Supplemental Information  Gabapentin had affected her mental health negatively causing suicidal ideation and hostility. Stopped this  medication.      SOCIAL HISTORY  She works at the [employer]. She admits to smoking 1 pack per day and is not interested in quitting at this time. Planning to quit when she is done with school in approx 2026    FAMILY HISTORY  Her mother had breast cancer at age 40 in 1999, which was aggressive and metastasized to her lymph nodes. She underwent bilateral mastectomy, chemotherapy, and radiation twice. Her maternal aunt passed away from stomach cancer. Her paternal grandmother was diagnosed with colon cancer in her 80s. She has 2 sisters, one of whom had cervical cancer. No family history of ovarian or uterine cancer.    MEDICATIONS  Ibuprofen as needed.      Vitals:    01/29/25 1403   BP: 113/75   Pulse: 90       Physical Exam  Exam conducted with a chaperone present.   Constitutional:       General: She is not in acute distress.     Appearance: She is well-developed. She is not diaphoretic.   HENT:      Head: Normocephalic and atraumatic.   Neck:      Thyroid: No thyromegaly.      Trachea: No tracheal deviation.   Cardiovascular:      Rate and Rhythm: Normal rate.      Heart sounds: Normal heart sounds. No murmur heard.     No friction rub. No gallop.   Pulmonary:      Effort: Pulmonary effort is normal. No respiratory distress.      Breath sounds: Normal breath sounds.   Chest:      Chest wall: No tenderness.   Breasts:     Right: No inverted nipple, mass, nipple discharge, skin change or tenderness.      Left: No inverted nipple, mass, nipple discharge, skin change or tenderness.   Abdominal:      General: There is no distension.      Palpations: Abdomen is soft. There is no mass.      Tenderness: There is no abdominal tenderness.   Genitourinary:     General: Normal vulva.      Labia:         Right: No rash, lesion or injury.         Left: No rash, lesion or injury.       Vagina: No vaginal discharge, tenderness or bleeding.      Cervix: No cervical motion tenderness, discharge or friability.       Uterus: Not deviated, not enlarged, not fixed and not tender.       Adnexa:         Right: No mass, tenderness or fullness.          Left: No mass, tenderness or fullness.     Musculoskeletal:         General: No deformity. Normal range of motion.   Lymphadenopathy:      Cervical: No cervical adenopathy.   Skin:     General: Skin is warm and dry.      Findings: No rash.   Neurological:      Mental Status: She is alert and oriented to person, place, and time.   Psychiatric:         Behavior: Behavior normal.         Thought Content: Thought content normal.         Judgment: Judgment normal.           Results  Laboratory Studies  BRCA testing was negative.      Assessment & Plan  1. Annual examination.  Her last Pap smear was conducted in January 2023 and yielded normal results. The previous Pap smear in 2020 was also normal. Given these findings, it is deemed appropriate to defer the next Pap smear until the following year.  Colon cancer screening to start at 46yo. Declines STI testing    2. Complex Regional Pain Syndrome (CRPS).  She had a stellate ganglion block about a month or two ago, which caused her period to be a little late. She is currently seeing NP at [facility] for pain control. She is no longer taking gabapentin due to its negative impact on her mental health, including causing suicidal ideation and hostility. She is currently taking ibuprofen as needed for pain.    3. Smoking.  She continues to smoke 1 pack per day. She is not currently interested in quitting but plans to do so after graduation.    4. High risk for breast cancer:   Her Tyrer-Cuzick score is 21.3, indicating a lifetime risk of breast cancer exceeding 20 percent. A referral to a high-risk breast clinic at [facility] [place] will be initiated for further evaluation and guidance on the commencement and methodology of breast imaging.       Jessica Jacome MD  01/29/25 14:25 EST     Orders Placed This Encounter   Procedures    Ambulatory Referral to [facility]  Risk Breast ([facility])     Referral Priority:   Routine     Referral Type:   Consultation     Requested Specialty:   High Risk Breast     Number of Visits Requested:   1

## 2025-02-04 ENCOUNTER — TELEPHONE (OUTPATIENT)
Dept: SURGERY | Facility: CLINIC | Age: 30
End: 2025-02-04
Payer: COMMERCIAL

## 2025-02-04 NOTE — TELEPHONE ENCOUNTER
Spoke to pt and got her josué for a new pt appt with eber cueto     Pt stated understanding  Mailed new pt packet

## 2025-05-30 NOTE — PROGRESS NOTES
[facility]     Referring Provider: Jessica Jacome MD     Chief complaint: management of breast cancer risk     HPI: Ms. Kristin Jones is a 30yo woman, seen at the request of Jessica Jacome MD, for management of breast cancer risk.  Patient states that she had genetic testing, the extensive panel in  that was negative but she is unsure where she had the testing.    I personally reviewed her records and summarized her relevant breast history/imagin2020 right complete breast ultrasound at [facility]  Finding 1:  There is no sonographic correlate in the area of the palpable abnormality in the right breast at 4  o'clock.  There are no suspicious masses, areas of focal shadowing or distortion seen.  At this time, there is  nothing to suggest malignancy.  Finding 2:  There is an oval parallel anechoic simple cyst with circumscribed margins measuring 9 mm seen in the  10:30 o'clock region of the right breast located 11 centimeters from the nipple.  This is an incidental finding(s).  No solid or suspicious masses seen and no findings to suggest malignancy at this time.  Patient states she previously has some swelling and slight erythema which has resolved. She is currently on day 2  of antibiotic prescribed by her OBGYN physician. On todays exam there is no swelling. Nipple arealor complex is  normal. There is no significant erythema or skin thickening. Imaging of the subareolar region demonstrated normal  ectatic ducts with no evidence of intraductal lesion, irregularity or debris.  All four quadrants of the breast(s), axilla, and the retroareolar region were imaged.  IMPRESSION:  Finding 1:  There is no sonographic evidence of malignancy. There is no sonographic correlate to explain the  palpable area of concern. Any further management of this symptom should be based on findings at clinical  assessment. That is, negative imaging should not preclude further evaluation and/or biopsy of suspicious  clinical  findings.  Finding 2:  Simple cyst in the 10:30 o'clock region of the right breast located 11 centimeters from the nipple is  benign-negative.  A return to screening beginning at age 30  is recommended due to mother diagnosed with breast cancer at age 40.  BI-RADS Category 2: Benign Finding(s)    2021 [facility]  negative      She has a family history of breast cancer in her mother dx age 40.  She denies any family history of ovarian cancer.     Today she presents with concerns regarding family history of breast cancer in her mother at the age of 40 and if she is high risk.  She denies any breast lumps, pain, skin changes, or nipple discharge.  She denies any prior history of abnormal mammograms or breast biopsies.       Review of Systems - Oncology    Medications:    Current Outpatient Medications:     ibuprofen (ADVIL,MOTRIN) 200 MG tablet, Take 1 tablet by mouth Every 6 (Six) Hours As Needed for Mild Pain. HOLDING FOR DOS, Disp: , Rfl:     Allergies:  Allergies   Allergen Reactions    Doxycycline GI Intolerance    Kiwi Extract Swelling     Oral swelling    Sulfamethoxazole-Trimethoprim GI Intolerance    Nickel Dermatitis, Itching and Rash       Medical history:  Past Medical History:   Diagnosis Date    Anxiety     Arthritis     RIGHT ELBOW    Astigmatism     Chlamydia 2014    Chronic cough     CRPS (complex regional pain syndrome) type I     UPPER RIGHT EXTREM    Decreased ROM of wrist     RIGHT    Depression     Eczema     Hypoglycemia     Leaky heart valve     ASYMPTOMATIC, FOUND ON ECHO    Migraine     RLS (restless legs syndrome)     Sleep apnea     MILD NO MACHINE    Spinal headache 2012, 11.30.20    2012: spinal tap, 2020: from epidural    Stress incontinence in female     Tremor     RIGHT HAND    Wrist pain     RIGHT       Surgical History:  Past Surgical History:   Procedure Laterality Date    CARPAL TUNNEL RELEASE Right 7/29/2022    Procedure: RIGHT CARPAL TUNNEL RELEASE;  Surgeon: Merced  Damon KHAN MD;  Location: [facility];  Service: Orthopedics;  Laterality: Right;    CARPAL TUNNEL RELEASE Right 8/26/2022    Procedure: RIGHT WRIST REVISION CARPAL TUNNEL RELEASE;  Surgeon: Damon Blackwood MD;  Location: [facility];  Service: Orthopedics;  Laterality: Right;    CARPAL TUNNEL RELEASE WITH CUBITAL TUNNEL RELEASE Left 12/30/2022    Procedure: LEFT CARPAL TUNNEL RELEASE AND CUBITAL TUNNEL RELEASE;  Surgeon: Damon Blackwood MD;  Location: [facility];  Service: Orthopedics;  Laterality: Left;    CUBITAL TUNNEL RELEASE Right 5/26/2023    Procedure: RIGHT CUBITAL TUNNEL RELEASE;  Surgeon: Damon Blackwood MD;  Location: [facility];  Service: Orthopedics;  Laterality: Right;    DEQUERVAIN RELEASE Right 6/14/2024    Procedure: RIGHT WRIST FIRST DORSAL COMPARTMENT RELEASE;  Surgeon: Damon Blackwood MD;  Location: [facility];  Service: Orthopedics;  Laterality: Right;    WISDOM TOOTH EXTRACTION  12/2015       Family History:  Family History   Problem Relation Age of Onset    Breast cancer Mother 40        survivor    Hypertension Father     Liver cancer Father     Lung cancer Father     Bone cancer Father     Cervical cancer Sister         40's    Diabetes Maternal Aunt     Melanoma Maternal Uncle     Prostate cancer Paternal Uncle     Cancer Maternal Grandmother         [place]    Lung cancer Maternal Grandmother         double    Colon cancer Paternal Grandmother         80's    Prostate cancer Paternal Uncle     Prostate cancer Paternal Uncle     Ovarian cancer Neg Hx     Uterine cancer Neg Hx     Pulmonary embolism Neg Hx     Deep vein thrombosis Neg Hx     Malig Hyperthermia Neg Hx        Social History:   Social History     Socioeconomic History    Marital status: Single   Tobacco Use    Smoking status: Every Day     Current packs/day: 1.00     Average packs/day: 1 pack/day for 10.0 years (10.0 ttl pk-yrs)     Types: Cigarettes    Smokeless tobacco: Never   Vaping Use     Vaping status: Former   Substance and Sexual Activity    Alcohol use: Not Currently     Comment: rarely    Drug use: Yes     Types: Marijuana     Comment: DAILY    Sexual activity: Yes     Partners: Male     Birth control/protection: Condom     Patient drinks 1-2 servings of caffeine per day.       GYNECOLOGIC HISTORY:   . P: 1. AB: 0.  Last menstrual period: 2025  Age at menarche: 12  Age at first childbirth: 25  Lactation/How lon months   Age at menopause: N/A  Total years of oral contraceptive use: N/A  Total years of hormone replacement therapy: N/A      Physical Exam  There were no vitals filed for this visit.  ECOG 0 - Asymptomatic  General: NAD, well appearing  Psych: a&o x 3, normal mood and affect  Eyes: EOMI, no scleral icterus  ENMT: neck supple without masses or thyromegaly, mucus membranes moist  Resp: normal effort, CTAB  CV: RRR, no murmurs, no edema   GI: soft, NT, ND  MSK: normal gait, normal ROM in bilateral shoulders  Lymph nodes: no cervical, supraclavicular or axillary lymphadenopathy  Breast: symmetric, large, pendulous  Right: No visible abnormalities on inspection while seated, with arms raised or hands on hips. No masses, skin changes, or nipple abnormalities.  Left: No visible abnormalities on inspection while seated, with arms raised or hands on hips. No skin changes, or nipple abnormalities. 3:30 5cmfn 1cm lump      Assessment:    Family history of breast cancer  High risk for breast cancer-according to [facility] she is a lifetime risk of 27.3%    Discussion:  We discussed management options for individuals who are at increased risk (>20% lifetime risk):  1) High risk screening - Annual mammogram and annual breast MRI, alternating one test every 6 months, biannual clinical exam and monthly self breast exam. She meets criteria for high risk screening.  2) Chemoprevention with Tamoxifen, Raloxifene or Exemestane. These may reduce risk up to 50%. I reviewed that these particular  medications are not without risks and the risk/benefit ratio must be considered carefully. She is not interested in this currently.  3) Risk reducing surgery such as prophylactic mastectomy  which may reduce risk by 90-95%. We discussed that this is a relatively radical strategy and is generally reserved for individuals with a known genetic mutation predisposing them to an increased risk (~50% risk) of breast cancer. She does not meet criteria for risk reducing surgery.   4) I discussed the importance of exercise and weight management as part of a risk reducing strategy, since increased BMI is associated with an increased risk of breast cancer.   Sabiha not preformed due to age     Plan:  Pasquale dx mammo and left limited us in the near future, call with results  MRI and exam 6 mons later       MICHAEL Armando    I have spent 35 mins in face to face time with the patient and in chart review.    CC:  No ref. provider found  Chata Bolanos APRN    EMR Dragon/transcription disclaimer:  Dictated using Dragon dictation

## 2025-06-02 ENCOUNTER — OFFICE VISIT (OUTPATIENT)
Dept: SURGERY | Facility: CLINIC | Age: 30
End: 2025-06-02
Payer: COMMERCIAL

## 2025-06-02 VITALS
BODY MASS INDEX: 25.16 KG/M2 | DIASTOLIC BLOOD PRESSURE: 66 MMHG | OXYGEN SATURATION: 98 % | SYSTOLIC BLOOD PRESSURE: 112 MMHG | HEIGHT: 68 IN | HEART RATE: 112 BPM | WEIGHT: 166 LBS

## 2025-06-02 DIAGNOSIS — N63.23 MASS OF LOWER OUTER QUADRANT OF LEFT BREAST: ICD-10-CM

## 2025-06-02 DIAGNOSIS — Z91.89 AT HIGH RISK FOR BREAST CANCER: Primary | ICD-10-CM

## 2025-06-02 PROCEDURE — 1160F RVW MEDS BY RX/DR IN RCRD: CPT | Performed by: NURSE PRACTITIONER

## 2025-06-02 PROCEDURE — 1159F MED LIST DOCD IN RCRD: CPT | Performed by: NURSE PRACTITIONER

## 2025-06-02 PROCEDURE — 99214 OFFICE O/P EST MOD 30 MIN: CPT | Performed by: NURSE PRACTITIONER

## 2025-07-08 ENCOUNTER — HOSPITAL ENCOUNTER (OUTPATIENT)
Dept: ULTRASOUND IMAGING | Facility: HOSPITAL | Age: 30
Discharge: HOME | End: 2025-07-08
Payer: COMMERCIAL

## 2025-07-08 ENCOUNTER — HOSPITAL ENCOUNTER (OUTPATIENT)
Dept: MAMMOGRAPHY | Facility: HOSPITAL | Age: 30
Discharge: HOME | End: 2025-07-08
Payer: COMMERCIAL

## 2025-07-08 PROCEDURE — G0279 TOMOSYNTHESIS, MAMMO: HCPCS

## 2025-07-08 PROCEDURE — 77066 DX MAMMO INCL CAD BI: CPT

## 2025-07-08 PROCEDURE — 76642 ULTRASOUND BREAST LIMITED: CPT

## 2025-07-14 ENCOUNTER — TELEPHONE (OUTPATIENT)
Dept: MAMMOGRAPHY | Facility: HOSPITAL | Age: 30
End: 2025-07-14
Payer: COMMERCIAL

## 2025-07-14 ENCOUNTER — PREP FOR SURGERY (OUTPATIENT)
Dept: OTHER | Facility: HOSPITAL | Age: 30
End: 2025-07-14
Payer: COMMERCIAL

## 2025-07-14 DIAGNOSIS — R92.8 ABNORMAL FINDINGS ON DIAGNOSTIC IMAGING OF BREAST: Primary | ICD-10-CM

## 2025-07-14 NOTE — TELEPHONE ENCOUNTER
Spoke to patient informing her to arrive at 8:30AM for 9:30AM appointment on 7/17/25 in mammography department. Informed patient that there are no food or drink restrictions and that she can drive herself to and from appointment. Advised patient regarding type of clothing to wear, including a comfortable bra, and timeframe to expect results. This RN provided contact number should she need to reach us prior to appointment. Patient verbalized understanding.

## 2025-07-14 NOTE — TELEPHONE ENCOUNTER
Called Mrs. Jones Regarding her recent left breast diagnostic mammogram. There was an abnormality seen in the left breast for which biopsy is recommended. She is recommended to proceed with a left breast US guided biopsy. This was schedule for  July 17 at 930 am she will arrive at 830.She voiced understanding and will call with further questions or concerns.

## 2025-07-17 ENCOUNTER — HOSPITAL ENCOUNTER (OUTPATIENT)
Dept: MAMMOGRAPHY | Facility: HOSPITAL | Age: 30
Discharge: HOME | End: 2025-07-17
Payer: COMMERCIAL

## 2025-07-17 ENCOUNTER — HOSPITAL ENCOUNTER (OUTPATIENT)
Dept: ULTRASOUND IMAGING | Facility: HOSPITAL | Age: 30
Discharge: HOME | End: 2025-07-17
Payer: COMMERCIAL

## 2025-07-17 VITALS
DIASTOLIC BLOOD PRESSURE: 68 MMHG | OXYGEN SATURATION: 100 % | HEART RATE: 68 BPM | RESPIRATION RATE: 16 BRPM | SYSTOLIC BLOOD PRESSURE: 104 MMHG

## 2025-07-17 DIAGNOSIS — R92.8 ABNORMAL FINDINGS ON DIAGNOSTIC IMAGING OF BREAST: ICD-10-CM

## 2025-07-17 PROCEDURE — 88305 TISSUE EXAM BY PATHOLOGIST: CPT | Performed by: NURSE PRACTITIONER

## 2025-07-17 PROCEDURE — A4648 IMPLANTABLE TISSUE MARKER: HCPCS

## 2025-07-17 PROCEDURE — 25010000002 LIDOCAINE-EPINEPHRINE (PF) 1 %-1:200000 SOLUTION: Performed by: RADIOLOGY

## 2025-07-17 PROCEDURE — 77065 DX MAMMO INCL CAD UNI: CPT

## 2025-07-17 PROCEDURE — 25010000002 LIDOCAINE 1 % SOLUTION: Performed by: RADIOLOGY

## 2025-07-17 RX ORDER — LIDOCAINE HYDROCHLORIDE 10 MG/ML
10 INJECTION, SOLUTION INFILTRATION; PERINEURAL ONCE
Status: COMPLETED | OUTPATIENT
Start: 2025-07-17 | End: 2025-07-17

## 2025-07-17 RX ADMIN — LIDOCAINE HYDROCHLORIDE 10 ML: 10 INJECTION, SOLUTION INFILTRATION; PERINEURAL at 10:14

## 2025-07-17 RX ADMIN — LIDOCAINE HYDROCHLORIDE 9 ML: 10; .005 INJECTION, SOLUTION EPIDURAL; INFILTRATION; INTRACAUDAL; PERINEURAL at 10:14

## 2025-07-17 NOTE — NURSING NOTE
Biopsy site to  left    breast clear with Exofin dry and intact. No firmness or swelling noted at or around biopsy site. Denies pain. Ice pack with protective covering applied to biopsy site. Discharge instructions discussed with understanding voiced by patient. Copies provided to patient. No distress noted. To home via personal vehicle.

## 2025-07-18 LAB
CYTO UR: NORMAL
LAB AP CASE REPORT: NORMAL
LAB AP CLINICAL INFORMATION: NORMAL
LAB AP INTRADEPARTMENTAL CONSULT: NORMAL
PATH REPORT.FINAL DX SPEC: NORMAL
PATH REPORT.GROSS SPEC: NORMAL

## 2025-07-21 DIAGNOSIS — D24.2 FIBROADENOMA OF LEFT BREAST: Primary | ICD-10-CM

## (undated) DEVICE — DRSNG GZ PETROLTM XEROFORM CURAD 1X8IN STRL

## (undated) DEVICE — DRAPE,HAND,STERILE: Brand: MEDLINE

## (undated) DEVICE — BNDG ELAS ELITE V/CLOSE 3IN 5YD LF STRL

## (undated) DEVICE — SPNG GZ WOVN 4X4IN 12PLY 10/BX STRL

## (undated) DEVICE — DISPOSABLE TOURNIQUET CUFF SINGLE BLADDER, SINGLE PORT AND QUICK CONNECT CONNECTOR: Brand: COLOR CUFF

## (undated) DEVICE — DISPOSABLE BIPOLAR FORCEPS 4" (10.2CM) JEWELERS, STRAIGHT 0.4MM TIP AND 12 FT. (3.6M) CABLE: Brand: KIRWAN

## (undated) DEVICE — ARM SLING: Brand: DEROYAL

## (undated) DEVICE — INTENDED FOR TISSUE SEPARATION, AND OTHER PROCEDURES THAT REQUIRE A SHARP SURGICAL BLADE TO PUNCTURE OR CUT.: Brand: BARD-PARKER ® CARBON RIB-BACK BLADES

## (undated) DEVICE — APPL CHLORAPREP HI/LITE 26ML ORNG

## (undated) DEVICE — SOL ISO/ALC RUB 70PCT 4OZ

## (undated) DEVICE — BNDG ELAS MATRX V/CLS 3INX5YD LF

## (undated) DEVICE — DRESSING,GAUZE,XEROFORM,CURAD,1"X8",ST: Brand: CURAD

## (undated) DEVICE — BNDG ELAS MATRX  2IN 5YD LF STRL

## (undated) DEVICE — RUBBERBAND LF STRL PK/2

## (undated) DEVICE — BLD SCLPL BEAVR MINI STR 2BVL 180D LF

## (undated) DEVICE — WEBRIL* CAST PADDING: Brand: DEROYAL

## (undated) DEVICE — SUT PROLN 4/0 FS2 18IN 8683G

## (undated) DEVICE — PK ORTHO MINOR TOWER 40

## (undated) DEVICE — UNDRGLV SURG BIOGEL PUNCTUREINDICATION SZ7 PF STRL

## (undated) DEVICE — GLV SURG BIOGEL LTX PF 7

## (undated) DEVICE — SUT MNCRYL 3/0 RB1 27IN UD Y215H

## (undated) DEVICE — VESSEL LOOPS X-RAY DETECTABLE: Brand: DEROYAL

## (undated) DEVICE — WEBRIL COTTON UNDERCAST PADDING: Brand: WEBRIL

## (undated) DEVICE — DRAPE,U/ SHT,SPLIT,PLAS,STERIL: Brand: MEDLINE

## (undated) DEVICE — BNDG,ELSTC,MATRIX,STRL,2"X5YD,LF,HOOK&LP: Brand: MEDLINE

## (undated) DEVICE — PAD UNDERCAST WYTEX 3IN 4YD LF STRL

## (undated) DEVICE — SOL ISO/ALC 70PCT 4OZ

## (undated) DEVICE — SUT PROLN 4/0 PS2 18IN BLU

## (undated) DEVICE — SUT MNCRYL 3/0 PS2 18IN Y497G